# Patient Record
Sex: FEMALE | Race: WHITE | NOT HISPANIC OR LATINO
[De-identification: names, ages, dates, MRNs, and addresses within clinical notes are randomized per-mention and may not be internally consistent; named-entity substitution may affect disease eponyms.]

---

## 2022-01-01 ENCOUNTER — RESULT REVIEW (OUTPATIENT)
Age: 57
End: 2022-01-01

## 2022-01-01 ENCOUNTER — NON-APPOINTMENT (OUTPATIENT)
Age: 57
End: 2022-01-01

## 2022-01-01 ENCOUNTER — APPOINTMENT (OUTPATIENT)
Dept: CT IMAGING | Facility: CLINIC | Age: 57
End: 2022-01-01
Payer: COMMERCIAL

## 2022-01-01 ENCOUNTER — APPOINTMENT (OUTPATIENT)
Dept: HEMATOLOGY ONCOLOGY | Facility: CLINIC | Age: 57
End: 2022-01-01

## 2022-01-01 ENCOUNTER — APPOINTMENT (OUTPATIENT)
Age: 57
End: 2022-01-01

## 2022-01-01 ENCOUNTER — OUTPATIENT (OUTPATIENT)
Dept: OUTPATIENT SERVICES | Facility: HOSPITAL | Age: 57
LOS: 1 days | End: 2022-01-01

## 2022-01-01 ENCOUNTER — OUTPATIENT (OUTPATIENT)
Dept: OUTPATIENT SERVICES | Facility: HOSPITAL | Age: 57
LOS: 1 days | End: 2022-01-01
Payer: COMMERCIAL

## 2022-01-01 ENCOUNTER — EMERGENCY (EMERGENCY)
Facility: HOSPITAL | Age: 57
LOS: 1 days | Discharge: HOME | End: 2022-01-01
Attending: STUDENT IN AN ORGANIZED HEALTH CARE EDUCATION/TRAINING PROGRAM
Payer: COMMERCIAL

## 2022-01-01 ENCOUNTER — OUTPATIENT (OUTPATIENT)
Dept: OUTPATIENT SERVICES | Facility: HOSPITAL | Age: 57
LOS: 1 days | Discharge: HOME | End: 2022-01-01

## 2022-01-01 ENCOUNTER — LABORATORY RESULT (OUTPATIENT)
Age: 57
End: 2022-01-01

## 2022-01-01 ENCOUNTER — TRANSCRIPTION ENCOUNTER (OUTPATIENT)
Age: 57
End: 2022-01-01

## 2022-01-01 ENCOUNTER — APPOINTMENT (OUTPATIENT)
Dept: INTERVENTIONAL RADIOLOGY/VASCULAR | Facility: CLINIC | Age: 57
End: 2022-01-01

## 2022-01-01 ENCOUNTER — APPOINTMENT (OUTPATIENT)
Dept: GASTROENTEROLOGY | Facility: GI CENTER | Age: 57
End: 2022-01-01

## 2022-01-01 ENCOUNTER — APPOINTMENT (OUTPATIENT)
Dept: GASTROENTEROLOGY | Facility: CLINIC | Age: 57
End: 2022-01-01

## 2022-01-01 ENCOUNTER — APPOINTMENT (OUTPATIENT)
Dept: COLORECTAL SURGERY | Facility: CLINIC | Age: 57
End: 2022-01-01

## 2022-01-01 VITALS
SYSTOLIC BLOOD PRESSURE: 128 MMHG | RESPIRATION RATE: 20 BRPM | OXYGEN SATURATION: 96 % | HEART RATE: 77 BPM | TEMPERATURE: 99 F | DIASTOLIC BLOOD PRESSURE: 84 MMHG

## 2022-01-01 VITALS
RESPIRATION RATE: 14 BRPM | OXYGEN SATURATION: 98 % | HEART RATE: 70 BPM | BODY MASS INDEX: 29.44 KG/M2 | HEIGHT: 62 IN | WEIGHT: 160 LBS | SYSTOLIC BLOOD PRESSURE: 110 MMHG | DIASTOLIC BLOOD PRESSURE: 80 MMHG

## 2022-01-01 VITALS
DIASTOLIC BLOOD PRESSURE: 76 MMHG | RESPIRATION RATE: 16 BRPM | TEMPERATURE: 98 F | HEART RATE: 92 BPM | SYSTOLIC BLOOD PRESSURE: 113 MMHG | OXYGEN SATURATION: 97 %

## 2022-01-01 VITALS
HEIGHT: 62 IN | SYSTOLIC BLOOD PRESSURE: 124 MMHG | OXYGEN SATURATION: 92 % | WEIGHT: 157.01 LBS | DIASTOLIC BLOOD PRESSURE: 84 MMHG | HEART RATE: 102 BPM | BODY MASS INDEX: 28.89 KG/M2

## 2022-01-01 VITALS
RESPIRATION RATE: 16 BRPM | SYSTOLIC BLOOD PRESSURE: 132 MMHG | HEIGHT: 62 IN | HEART RATE: 108 BPM | DIASTOLIC BLOOD PRESSURE: 84 MMHG | WEIGHT: 160 LBS | TEMPERATURE: 97.3 F | BODY MASS INDEX: 29.44 KG/M2 | OXYGEN SATURATION: 97 %

## 2022-01-01 VITALS
TEMPERATURE: 97.4 F | HEART RATE: 84 BPM | RESPIRATION RATE: 16 BRPM | DIASTOLIC BLOOD PRESSURE: 80 MMHG | OXYGEN SATURATION: 96 % | SYSTOLIC BLOOD PRESSURE: 111 MMHG

## 2022-01-01 VITALS
HEART RATE: 103 BPM | BODY MASS INDEX: 25.97 KG/M2 | DIASTOLIC BLOOD PRESSURE: 79 MMHG | OXYGEN SATURATION: 98 % | WEIGHT: 142 LBS | SYSTOLIC BLOOD PRESSURE: 114 MMHG

## 2022-01-01 VITALS
RESPIRATION RATE: 18 BRPM | HEART RATE: 90 BPM | DIASTOLIC BLOOD PRESSURE: 87 MMHG | SYSTOLIC BLOOD PRESSURE: 126 MMHG | WEIGHT: 156.09 LBS | OXYGEN SATURATION: 97 % | TEMPERATURE: 98 F | HEIGHT: 64.17 IN

## 2022-01-01 VITALS
HEART RATE: 112 BPM | OXYGEN SATURATION: 97 % | DIASTOLIC BLOOD PRESSURE: 75 MMHG | BODY MASS INDEX: 21.9 KG/M2 | WEIGHT: 119.01 LBS | SYSTOLIC BLOOD PRESSURE: 107 MMHG | HEIGHT: 62 IN

## 2022-01-01 DIAGNOSIS — R18.0 MALIGNANT ASCITES: ICD-10-CM

## 2022-01-01 DIAGNOSIS — C20 MALIGNANT NEOPLASM OF RECTUM: ICD-10-CM

## 2022-01-01 DIAGNOSIS — Z51.89 ENCOUNTER FOR OTHER SPECIFIED AFTERCARE: ICD-10-CM

## 2022-01-01 DIAGNOSIS — C78.6 SECONDARY MALIGNANT NEOPLASM OF RETROPERITONEUM AND PERITONEUM: ICD-10-CM

## 2022-01-01 DIAGNOSIS — R11.2 NAUSEA WITH VOMITING, UNSPECIFIED: ICD-10-CM

## 2022-01-01 DIAGNOSIS — Z12.11 ENCOUNTER FOR SCREENING FOR MALIGNANT NEOPLASM OF COLON: ICD-10-CM

## 2022-01-01 DIAGNOSIS — Z82.49 FAMILY HISTORY OF ISCHEMIC HEART DISEASE AND OTHER DISEASES OF THE CIRCULATORY SYSTEM: ICD-10-CM

## 2022-01-01 DIAGNOSIS — R19.4 CHANGE IN BOWEL HABIT: ICD-10-CM

## 2022-01-01 DIAGNOSIS — Z83.3 FAMILY HISTORY OF DIABETES MELLITUS: ICD-10-CM

## 2022-01-01 DIAGNOSIS — Z51.11 ENCOUNTER FOR ANTINEOPLASTIC CHEMOTHERAPY: ICD-10-CM

## 2022-01-01 DIAGNOSIS — Z87.891 PERSONAL HISTORY OF NICOTINE DEPENDENCE: ICD-10-CM

## 2022-01-01 DIAGNOSIS — C21.8 MALIGNANT NEOPLASM OF OVERLAPPING SITES OF RECTUM, ANUS AND ANAL CANAL: ICD-10-CM

## 2022-01-01 DIAGNOSIS — E86.0 DEHYDRATION: ICD-10-CM

## 2022-01-01 DIAGNOSIS — Z78.9 OTHER SPECIFIED HEALTH STATUS: ICD-10-CM

## 2022-01-01 DIAGNOSIS — Z00.8 ENCOUNTER FOR OTHER GENERAL EXAMINATION: ICD-10-CM

## 2022-01-01 DIAGNOSIS — E87.6 HYPOKALEMIA: ICD-10-CM

## 2022-01-01 DIAGNOSIS — K62.89 OTHER SPECIFIED DISEASES OF ANUS AND RECTUM: ICD-10-CM

## 2022-01-01 LAB
ALBUMIN SERPL ELPH-MCNC: 2.8 G/DL — LOW (ref 3.3–5)
ALBUMIN SERPL ELPH-MCNC: 3.3 G/DL — SIGNIFICANT CHANGE UP (ref 3.3–5)
ALBUMIN SERPL ELPH-MCNC: 3.4 G/DL — SIGNIFICANT CHANGE UP (ref 3.3–5)
ALBUMIN SERPL ELPH-MCNC: 3.9 G/DL — SIGNIFICANT CHANGE UP (ref 3.3–5)
ALBUMIN SERPL ELPH-MCNC: 3.9 G/DL — SIGNIFICANT CHANGE UP (ref 3.3–5)
ALBUMIN SERPL ELPH-MCNC: 4 G/DL — SIGNIFICANT CHANGE UP (ref 3.3–5)
ALBUMIN SERPL ELPH-MCNC: 4 G/DL — SIGNIFICANT CHANGE UP (ref 3.3–5)
ALBUMIN SERPL ELPH-MCNC: 4 G/DL — SIGNIFICANT CHANGE UP (ref 3.3–5.2)
ALBUMIN SERPL ELPH-MCNC: 4.1 G/DL — SIGNIFICANT CHANGE UP (ref 3.3–5)
ALBUMIN SERPL ELPH-MCNC: 4.2 G/DL — SIGNIFICANT CHANGE UP (ref 3.3–5)
ALBUMIN SERPL ELPH-MCNC: 4.3 G/DL
ALP BLD-CCNC: 98 U/L
ALP SERPL-CCNC: 108 U/L — SIGNIFICANT CHANGE UP (ref 40–120)
ALP SERPL-CCNC: 109 U/L — SIGNIFICANT CHANGE UP (ref 40–120)
ALP SERPL-CCNC: 120 U/L — SIGNIFICANT CHANGE UP (ref 40–120)
ALP SERPL-CCNC: 124 U/L — HIGH (ref 40–120)
ALP SERPL-CCNC: 72 U/L — SIGNIFICANT CHANGE UP (ref 40–120)
ALP SERPL-CCNC: 74 U/L — SIGNIFICANT CHANGE UP (ref 40–120)
ALP SERPL-CCNC: 78 U/L — SIGNIFICANT CHANGE UP (ref 40–120)
ALP SERPL-CCNC: 80 U/L — SIGNIFICANT CHANGE UP (ref 40–120)
ALP SERPL-CCNC: 85 U/L — SIGNIFICANT CHANGE UP (ref 40–120)
ALP SERPL-CCNC: 85 U/L — SIGNIFICANT CHANGE UP (ref 40–120)
ALT FLD-CCNC: 11 U/L — SIGNIFICANT CHANGE UP (ref 10–45)
ALT FLD-CCNC: 11 U/L — SIGNIFICANT CHANGE UP (ref 10–45)
ALT FLD-CCNC: 12 U/L — SIGNIFICANT CHANGE UP (ref 10–45)
ALT FLD-CCNC: 13 U/L — SIGNIFICANT CHANGE UP (ref 10–45)
ALT FLD-CCNC: 13 U/L — SIGNIFICANT CHANGE UP (ref 10–45)
ALT FLD-CCNC: 14 U/L — SIGNIFICANT CHANGE UP
ALT FLD-CCNC: 15 U/L — SIGNIFICANT CHANGE UP (ref 10–45)
ALT FLD-CCNC: 21 U/L — SIGNIFICANT CHANGE UP (ref 10–45)
ALT FLD-CCNC: 31 U/L — SIGNIFICANT CHANGE UP (ref 10–45)
ALT FLD-CCNC: 8 U/L — LOW (ref 10–45)
ALT SERPL-CCNC: 14 U/L
ANION GAP SERPL CALC-SCNC: 10 MMOL/L — SIGNIFICANT CHANGE UP (ref 5–17)
ANION GAP SERPL CALC-SCNC: 11 MMOL/L — SIGNIFICANT CHANGE UP (ref 5–17)
ANION GAP SERPL CALC-SCNC: 12 MMOL/L — SIGNIFICANT CHANGE UP (ref 5–17)
ANION GAP SERPL CALC-SCNC: 13 MMOL/L
ANION GAP SERPL CALC-SCNC: 13 MMOL/L — SIGNIFICANT CHANGE UP (ref 5–17)
ANION GAP SERPL CALC-SCNC: 15 MMOL/L — SIGNIFICANT CHANGE UP (ref 5–17)
ANION GAP SERPL CALC-SCNC: 8 MMOL/L — SIGNIFICANT CHANGE UP (ref 5–17)
ANION GAP SERPL CALC-SCNC: 8 MMOL/L — SIGNIFICANT CHANGE UP (ref 5–17)
ANION GAP SERPL CALC-SCNC: 9 MMOL/L — SIGNIFICANT CHANGE UP (ref 5–17)
ANISOCYTOSIS BLD QL: SLIGHT — SIGNIFICANT CHANGE UP
APPEARANCE UR: ABNORMAL
APPEARANCE UR: ABNORMAL
APTT BLD: 29.9 SEC — SIGNIFICANT CHANGE UP (ref 27.5–35.5)
APTT BLD: 32 SEC
AST SERPL-CCNC: 22 U/L — SIGNIFICANT CHANGE UP (ref 10–40)
AST SERPL-CCNC: 28 U/L — SIGNIFICANT CHANGE UP
AST SERPL-CCNC: 28 U/L — SIGNIFICANT CHANGE UP (ref 10–40)
AST SERPL-CCNC: 30 U/L
AST SERPL-CCNC: 33 U/L — SIGNIFICANT CHANGE UP (ref 10–40)
AST SERPL-CCNC: 35 U/L — SIGNIFICANT CHANGE UP (ref 10–40)
AST SERPL-CCNC: 35 U/L — SIGNIFICANT CHANGE UP (ref 10–40)
AST SERPL-CCNC: 37 U/L — SIGNIFICANT CHANGE UP (ref 10–40)
AST SERPL-CCNC: 39 U/L — SIGNIFICANT CHANGE UP (ref 10–40)
AST SERPL-CCNC: 47 U/L — HIGH (ref 10–40)
AST SERPL-CCNC: 52 U/L — HIGH (ref 10–40)
BACTERIA # UR AUTO: NEGATIVE — SIGNIFICANT CHANGE UP
BACTERIA # UR AUTO: NEGATIVE — SIGNIFICANT CHANGE UP
BASOPHILS # BLD AUTO: 0 K/UL — SIGNIFICANT CHANGE UP (ref 0–0.2)
BASOPHILS # BLD AUTO: 0 K/UL — SIGNIFICANT CHANGE UP (ref 0–0.2)
BASOPHILS # BLD AUTO: 0.02 K/UL — SIGNIFICANT CHANGE UP (ref 0–0.2)
BASOPHILS # BLD AUTO: 0.1 K/UL — SIGNIFICANT CHANGE UP (ref 0–0.2)
BASOPHILS NFR BLD AUTO: 0.5 % — SIGNIFICANT CHANGE UP (ref 0–2)
BASOPHILS NFR BLD AUTO: 0.7 % — SIGNIFICANT CHANGE UP (ref 0–2)
BASOPHILS NFR BLD AUTO: 0.9 % — SIGNIFICANT CHANGE UP (ref 0–2)
BASOPHILS NFR BLD AUTO: 1 % — SIGNIFICANT CHANGE UP (ref 0–2)
BASOPHILS NFR BLD AUTO: 1.1 % — SIGNIFICANT CHANGE UP (ref 0–2)
BASOPHILS NFR BLD AUTO: 3 % — HIGH (ref 0–2)
BILIRUB SERPL-MCNC: 0.2 MG/DL — SIGNIFICANT CHANGE UP (ref 0.2–1.2)
BILIRUB SERPL-MCNC: 0.4 MG/DL — SIGNIFICANT CHANGE UP (ref 0.2–1.2)
BILIRUB SERPL-MCNC: 0.4 MG/DL — SIGNIFICANT CHANGE UP (ref 0.2–1.2)
BILIRUB SERPL-MCNC: 0.5 MG/DL — SIGNIFICANT CHANGE UP (ref 0.2–1.2)
BILIRUB SERPL-MCNC: 0.5 MG/DL — SIGNIFICANT CHANGE UP (ref 0.2–1.2)
BILIRUB SERPL-MCNC: 0.6 MG/DL — SIGNIFICANT CHANGE UP (ref 0.2–1.2)
BILIRUB SERPL-MCNC: 0.7 MG/DL
BILIRUB SERPL-MCNC: 0.7 MG/DL — SIGNIFICANT CHANGE UP (ref 0.2–1.2)
BILIRUB SERPL-MCNC: 0.7 MG/DL — SIGNIFICANT CHANGE UP (ref 0.2–1.2)
BILIRUB SERPL-MCNC: 0.9 MG/DL — SIGNIFICANT CHANGE UP (ref 0.2–1.2)
BILIRUB SERPL-MCNC: 0.9 MG/DL — SIGNIFICANT CHANGE UP (ref 0.4–2)
BILIRUB UR-MCNC: NEGATIVE — SIGNIFICANT CHANGE UP
BILIRUB UR-MCNC: NEGATIVE — SIGNIFICANT CHANGE UP
BUN SERPL-MCNC: 10 MG/DL
BUN SERPL-MCNC: 18 MG/DL — SIGNIFICANT CHANGE UP (ref 7–23)
BUN SERPL-MCNC: 6 MG/DL — LOW (ref 7–23)
BUN SERPL-MCNC: 6 MG/DL — LOW (ref 7–23)
BUN SERPL-MCNC: 7 MG/DL — SIGNIFICANT CHANGE UP (ref 7–23)
BUN SERPL-MCNC: 7.1 MG/DL — LOW (ref 8–20)
BUN SERPL-MCNC: 8 MG/DL — SIGNIFICANT CHANGE UP (ref 7–23)
CALCIUM SERPL-MCNC: 7.1 MG/DL — LOW (ref 8.4–10.5)
CALCIUM SERPL-MCNC: 8.4 MG/DL — SIGNIFICANT CHANGE UP (ref 8.4–10.5)
CALCIUM SERPL-MCNC: 8.4 MG/DL — SIGNIFICANT CHANGE UP (ref 8.4–10.5)
CALCIUM SERPL-MCNC: 9.3 MG/DL — SIGNIFICANT CHANGE UP (ref 8.4–10.5)
CALCIUM SERPL-MCNC: 9.4 MG/DL — SIGNIFICANT CHANGE UP (ref 8.4–10.5)
CALCIUM SERPL-MCNC: 9.4 MG/DL — SIGNIFICANT CHANGE UP (ref 8.4–10.5)
CALCIUM SERPL-MCNC: 9.5 MG/DL — SIGNIFICANT CHANGE UP (ref 8.4–10.5)
CALCIUM SERPL-MCNC: 9.6 MG/DL — SIGNIFICANT CHANGE UP (ref 8.4–10.5)
CALCIUM SERPL-MCNC: 9.7 MG/DL
CALCIUM SERPL-MCNC: 9.7 MG/DL — SIGNIFICANT CHANGE UP (ref 8.4–10.5)
CALCIUM SERPL-MCNC: 9.8 MG/DL — SIGNIFICANT CHANGE UP (ref 8.4–10.5)
CEA SERPL-MCNC: 69.1 NG/ML
CHLORIDE SERPL-SCNC: 100 MMOL/L — SIGNIFICANT CHANGE UP (ref 96–108)
CHLORIDE SERPL-SCNC: 101 MMOL/L
CHLORIDE SERPL-SCNC: 101 MMOL/L — SIGNIFICANT CHANGE UP (ref 96–108)
CHLORIDE SERPL-SCNC: 102 MMOL/L — SIGNIFICANT CHANGE UP (ref 96–108)
CHLORIDE SERPL-SCNC: 109 MMOL/L — HIGH (ref 96–108)
CHLORIDE SERPL-SCNC: 90 MMOL/L — LOW (ref 96–108)
CHLORIDE SERPL-SCNC: 95 MMOL/L — LOW (ref 96–108)
CHLORIDE SERPL-SCNC: 95 MMOL/L — LOW (ref 96–108)
CHLORIDE SERPL-SCNC: 95 MMOL/L — LOW (ref 98–107)
CHLORIDE SERPL-SCNC: 96 MMOL/L — SIGNIFICANT CHANGE UP (ref 96–108)
CHLORIDE SERPL-SCNC: 97 MMOL/L — SIGNIFICANT CHANGE UP (ref 96–108)
CO2 SERPL-SCNC: 18 MMOL/L — LOW (ref 22–31)
CO2 SERPL-SCNC: 22 MMOL/L — SIGNIFICANT CHANGE UP (ref 22–31)
CO2 SERPL-SCNC: 24 MMOL/L — SIGNIFICANT CHANGE UP (ref 22–31)
CO2 SERPL-SCNC: 25 MMOL/L
CO2 SERPL-SCNC: 25 MMOL/L — SIGNIFICANT CHANGE UP (ref 22–31)
CO2 SERPL-SCNC: 26 MMOL/L — SIGNIFICANT CHANGE UP (ref 22–29)
CO2 SERPL-SCNC: 26 MMOL/L — SIGNIFICANT CHANGE UP (ref 22–31)
CO2 SERPL-SCNC: 26 MMOL/L — SIGNIFICANT CHANGE UP (ref 22–31)
CO2 SERPL-SCNC: 29 MMOL/L — SIGNIFICANT CHANGE UP (ref 22–31)
CO2 SERPL-SCNC: 29 MMOL/L — SIGNIFICANT CHANGE UP (ref 22–31)
CO2 SERPL-SCNC: 31 MMOL/L — SIGNIFICANT CHANGE UP (ref 22–31)
COD CRY URNS QL: ABNORMAL
COD CRY URNS QL: ABNORMAL
COLOR SPEC: ABNORMAL
COLOR SPEC: YELLOW — SIGNIFICANT CHANGE UP
COMMENT - URINE: SIGNIFICANT CHANGE UP
CREAT SERPL-MCNC: 0.46 MG/DL — LOW (ref 0.5–1.3)
CREAT SERPL-MCNC: 0.52 MG/DL — SIGNIFICANT CHANGE UP (ref 0.5–1.3)
CREAT SERPL-MCNC: 0.53 MG/DL — SIGNIFICANT CHANGE UP (ref 0.5–1.3)
CREAT SERPL-MCNC: 0.55 MG/DL — SIGNIFICANT CHANGE UP (ref 0.5–1.3)
CREAT SERPL-MCNC: 0.56 MG/DL — SIGNIFICANT CHANGE UP (ref 0.5–1.3)
CREAT SERPL-MCNC: 0.6 MG/DL — SIGNIFICANT CHANGE UP (ref 0.5–1.3)
CREAT SERPL-MCNC: 0.6 MG/DL — SIGNIFICANT CHANGE UP (ref 0.5–1.3)
CREAT SERPL-MCNC: 0.63 MG/DL — SIGNIFICANT CHANGE UP (ref 0.5–1.3)
CREAT SERPL-MCNC: 0.65 MG/DL — SIGNIFICANT CHANGE UP (ref 0.5–1.3)
CREAT SERPL-MCNC: 0.76 MG/DL — SIGNIFICANT CHANGE UP (ref 0.5–1.3)
CREAT SERPL-MCNC: 0.81 MG/DL
DACRYOCYTES BLD QL SMEAR: SLIGHT — SIGNIFICANT CHANGE UP
DACRYOCYTES BLD QL SMEAR: SLIGHT — SIGNIFICANT CHANGE UP
DIFF PNL FLD: NEGATIVE — SIGNIFICANT CHANGE UP
DIFF PNL FLD: NEGATIVE — SIGNIFICANT CHANGE UP
DOHLE BOD BLD QL SMEAR: PRESENT — SIGNIFICANT CHANGE UP
DOHLE BOD BLD QL SMEAR: PRESENT — SIGNIFICANT CHANGE UP
EGFR: 102 ML/MIN/1.73M2 — SIGNIFICANT CHANGE UP
EGFR: 103 ML/MIN/1.73M2 — SIGNIFICANT CHANGE UP
EGFR: 104 ML/MIN/1.73M2 — SIGNIFICANT CHANGE UP
EGFR: 104 ML/MIN/1.73M2 — SIGNIFICANT CHANGE UP
EGFR: 106 ML/MIN/1.73M2 — SIGNIFICANT CHANGE UP
EGFR: 106 ML/MIN/1.73M2 — SIGNIFICANT CHANGE UP
EGFR: 107 ML/MIN/1.73M2 — SIGNIFICANT CHANGE UP
EGFR: 108 ML/MIN/1.73M2 — SIGNIFICANT CHANGE UP
EGFR: 111 ML/MIN/1.73M2 — SIGNIFICANT CHANGE UP
EGFR: 84 ML/MIN/1.73M2
EGFR: 91 ML/MIN/1.73M2 — SIGNIFICANT CHANGE UP
EOSINOPHIL # BLD AUTO: 0 K/UL — SIGNIFICANT CHANGE UP (ref 0–0.5)
EOSINOPHIL # BLD AUTO: 0 K/UL — SIGNIFICANT CHANGE UP (ref 0–0.5)
EOSINOPHIL # BLD AUTO: 0.04 K/UL — SIGNIFICANT CHANGE UP (ref 0–0.5)
EOSINOPHIL # BLD AUTO: 0.1 K/UL — SIGNIFICANT CHANGE UP (ref 0–0.5)
EOSINOPHIL NFR BLD AUTO: 0.7 % — SIGNIFICANT CHANGE UP (ref 0–6)
EOSINOPHIL NFR BLD AUTO: 1 % — SIGNIFICANT CHANGE UP (ref 0–6)
EOSINOPHIL NFR BLD AUTO: 1.1 % — SIGNIFICANT CHANGE UP (ref 0–6)
EOSINOPHIL NFR BLD AUTO: 2 % — SIGNIFICANT CHANGE UP (ref 0–6)
EPI CELLS # UR: 2 /HPF — SIGNIFICANT CHANGE UP (ref 0–5)
EPI CELLS # UR: 2 /HPF — SIGNIFICANT CHANGE UP (ref 0–5)
GLUCOSE SERPL-MCNC: 108 MG/DL
GLUCOSE SERPL-MCNC: 120 MG/DL — HIGH (ref 70–99)
GLUCOSE SERPL-MCNC: 66 MG/DL — LOW (ref 70–99)
GLUCOSE SERPL-MCNC: 77 MG/DL — SIGNIFICANT CHANGE UP (ref 70–99)
GLUCOSE SERPL-MCNC: 85 MG/DL — SIGNIFICANT CHANGE UP (ref 70–99)
GLUCOSE SERPL-MCNC: 88 MG/DL — SIGNIFICANT CHANGE UP (ref 70–99)
GLUCOSE SERPL-MCNC: 90 MG/DL — SIGNIFICANT CHANGE UP (ref 70–99)
GLUCOSE SERPL-MCNC: 92 MG/DL — SIGNIFICANT CHANGE UP (ref 70–99)
GLUCOSE SERPL-MCNC: 94 MG/DL — SIGNIFICANT CHANGE UP (ref 70–99)
GLUCOSE SERPL-MCNC: 94 MG/DL — SIGNIFICANT CHANGE UP (ref 70–99)
GLUCOSE SERPL-MCNC: 98 MG/DL — SIGNIFICANT CHANGE UP (ref 70–99)
GLUCOSE UR QL: NEGATIVE — SIGNIFICANT CHANGE UP
GLUCOSE UR QL: SIGNIFICANT CHANGE UP
HAV IGM SER QL: NONREACTIVE
HBV CORE IGM SER QL: NONREACTIVE
HBV SURFACE AG SER QL: NONREACTIVE
HCG SERPL-ACNC: <4 MIU/ML — SIGNIFICANT CHANGE UP
HCT VFR BLD CALC: 35.6 % — SIGNIFICANT CHANGE UP (ref 34.5–45)
HCT VFR BLD CALC: 35.8 % — SIGNIFICANT CHANGE UP (ref 34.5–45)
HCT VFR BLD CALC: 36.2 % — SIGNIFICANT CHANGE UP (ref 34.5–45)
HCT VFR BLD CALC: 36.4 % — SIGNIFICANT CHANGE UP (ref 34.5–45)
HCT VFR BLD CALC: 38.1 % — SIGNIFICANT CHANGE UP (ref 34.5–45)
HCT VFR BLD CALC: 38.4 % — SIGNIFICANT CHANGE UP (ref 34.5–45)
HCT VFR BLD CALC: 38.9 % — SIGNIFICANT CHANGE UP (ref 34.5–45)
HCT VFR BLD CALC: 39 % — SIGNIFICANT CHANGE UP (ref 34.5–45)
HCT VFR BLD CALC: 39 % — SIGNIFICANT CHANGE UP (ref 34.5–45)
HCT VFR BLD CALC: 39.5 % — SIGNIFICANT CHANGE UP (ref 34.5–45)
HCV AB SER QL: NONREACTIVE
HCV S/CO RATIO: 0.07 S/CO
HGB BLD-MCNC: 12.2 G/DL — SIGNIFICANT CHANGE UP (ref 11.5–15.5)
HGB BLD-MCNC: 12.3 G/DL — SIGNIFICANT CHANGE UP (ref 11.5–15.5)
HGB BLD-MCNC: 12.5 G/DL — SIGNIFICANT CHANGE UP (ref 11.5–15.5)
HGB BLD-MCNC: 12.7 G/DL — SIGNIFICANT CHANGE UP (ref 11.5–15.5)
HGB BLD-MCNC: 12.9 G/DL — SIGNIFICANT CHANGE UP (ref 11.5–15.5)
HGB BLD-MCNC: 13 G/DL — SIGNIFICANT CHANGE UP (ref 11.5–15.5)
HGB BLD-MCNC: 13.2 G/DL — SIGNIFICANT CHANGE UP (ref 11.5–15.5)
HGB BLD-MCNC: 13.4 G/DL — SIGNIFICANT CHANGE UP (ref 11.5–15.5)
HYALINE CASTS # UR AUTO: 0 /LPF — SIGNIFICANT CHANGE UP (ref 0–7)
HYALINE CASTS # UR AUTO: 1 /LPF — SIGNIFICANT CHANGE UP (ref 0–7)
IMM GRANULOCYTES NFR BLD AUTO: 0.3 % — SIGNIFICANT CHANGE UP (ref 0–0.9)
INR BLD: 1.14 RATIO — SIGNIFICANT CHANGE UP (ref 0.88–1.16)
INR PPP: 1.11 RATIO
KETONES UR-MCNC: ABNORMAL
KETONES UR-MCNC: NEGATIVE — SIGNIFICANT CHANGE UP
LEUKOCYTE ESTERASE UR-ACNC: NEGATIVE — SIGNIFICANT CHANGE UP
LEUKOCYTE ESTERASE UR-ACNC: NEGATIVE — SIGNIFICANT CHANGE UP
LYMPHOCYTES # BLD AUTO: 1.74 K/UL — SIGNIFICANT CHANGE UP (ref 1–3.3)
LYMPHOCYTES # BLD AUTO: 1.8 K/UL — SIGNIFICANT CHANGE UP (ref 1–3.3)
LYMPHOCYTES # BLD AUTO: 14 % — SIGNIFICANT CHANGE UP (ref 13–44)
LYMPHOCYTES # BLD AUTO: 15 % — SIGNIFICANT CHANGE UP (ref 13–44)
LYMPHOCYTES # BLD AUTO: 2.4 K/UL — SIGNIFICANT CHANGE UP (ref 1–3.3)
LYMPHOCYTES # BLD AUTO: 2.7 K/UL — SIGNIFICANT CHANGE UP (ref 1–3.3)
LYMPHOCYTES # BLD AUTO: 2.8 K/UL — SIGNIFICANT CHANGE UP (ref 1–3.3)
LYMPHOCYTES # BLD AUTO: 2.8 K/UL — SIGNIFICANT CHANGE UP (ref 1–3.3)
LYMPHOCYTES # BLD AUTO: 22 % — SIGNIFICANT CHANGE UP (ref 13–44)
LYMPHOCYTES # BLD AUTO: 3 K/UL — SIGNIFICANT CHANGE UP (ref 1–3.3)
LYMPHOCYTES # BLD AUTO: 3.1 K/UL — SIGNIFICANT CHANGE UP (ref 1–3.3)
LYMPHOCYTES # BLD AUTO: 3.1 K/UL — SIGNIFICANT CHANGE UP (ref 1–3.3)
LYMPHOCYTES # BLD AUTO: 3.5 K/UL — HIGH (ref 1–3.3)
LYMPHOCYTES # BLD AUTO: 33.1 % — SIGNIFICANT CHANGE UP (ref 13–44)
LYMPHOCYTES # BLD AUTO: 36.8 % — SIGNIFICANT CHANGE UP (ref 13–44)
LYMPHOCYTES # BLD AUTO: 37 % — SIGNIFICANT CHANGE UP (ref 13–44)
LYMPHOCYTES # BLD AUTO: 44 % — SIGNIFICANT CHANGE UP (ref 13–44)
LYMPHOCYTES # BLD AUTO: 46.3 % — HIGH (ref 13–44)
LYMPHOCYTES # BLD AUTO: 46.7 % — HIGH (ref 13–44)
LYMPHOCYTES # BLD AUTO: 51 % — HIGH (ref 13–44)
MACROCYTES BLD QL: SLIGHT — SIGNIFICANT CHANGE UP
MAGNESIUM SERPL-MCNC: 2.1 MG/DL
MAGNESIUM SERPL-MCNC: 2.1 MG/DL — SIGNIFICANT CHANGE UP (ref 1.6–2.6)
MAGNESIUM SERPL-MCNC: 2.2 MG/DL — SIGNIFICANT CHANGE UP (ref 1.6–2.6)
MCHC RBC-ENTMCNC: 29.2 PG — SIGNIFICANT CHANGE UP (ref 27–34)
MCHC RBC-ENTMCNC: 29.2 PG — SIGNIFICANT CHANGE UP (ref 27–34)
MCHC RBC-ENTMCNC: 29.7 PG — SIGNIFICANT CHANGE UP (ref 27–34)
MCHC RBC-ENTMCNC: 30.1 PG — SIGNIFICANT CHANGE UP (ref 27–34)
MCHC RBC-ENTMCNC: 30.2 PG — SIGNIFICANT CHANGE UP (ref 27–34)
MCHC RBC-ENTMCNC: 30.2 PG — SIGNIFICANT CHANGE UP (ref 27–34)
MCHC RBC-ENTMCNC: 31.9 PG — SIGNIFICANT CHANGE UP (ref 27–34)
MCHC RBC-ENTMCNC: 32 PG — SIGNIFICANT CHANGE UP (ref 27–34)
MCHC RBC-ENTMCNC: 32.8 G/DL — SIGNIFICANT CHANGE UP (ref 32–36)
MCHC RBC-ENTMCNC: 32.9 PG — SIGNIFICANT CHANGE UP (ref 27–34)
MCHC RBC-ENTMCNC: 33 G/DL — SIGNIFICANT CHANGE UP (ref 32–36)
MCHC RBC-ENTMCNC: 33.2 G/DL — SIGNIFICANT CHANGE UP (ref 32–36)
MCHC RBC-ENTMCNC: 33.4 G/DL — SIGNIFICANT CHANGE UP (ref 32–36)
MCHC RBC-ENTMCNC: 33.4 PG — SIGNIFICANT CHANGE UP (ref 27–34)
MCHC RBC-ENTMCNC: 33.6 G/DL — SIGNIFICANT CHANGE UP (ref 32–36)
MCHC RBC-ENTMCNC: 33.7 G/DL — SIGNIFICANT CHANGE UP (ref 32–36)
MCHC RBC-ENTMCNC: 34 G/DL — SIGNIFICANT CHANGE UP (ref 32–36)
MCHC RBC-ENTMCNC: 34.3 GM/DL — SIGNIFICANT CHANGE UP (ref 32–36)
MCHC RBC-ENTMCNC: 34.6 G/DL — SIGNIFICANT CHANGE UP (ref 32–36)
MCHC RBC-ENTMCNC: 35 G/DL — SIGNIFICANT CHANGE UP (ref 32–36)
MCV RBC AUTO: 85 FL — SIGNIFICANT CHANGE UP (ref 80–100)
MCV RBC AUTO: 88.3 FL — SIGNIFICANT CHANGE UP (ref 80–100)
MCV RBC AUTO: 88.9 FL — SIGNIFICANT CHANGE UP (ref 80–100)
MCV RBC AUTO: 90.4 FL — SIGNIFICANT CHANGE UP (ref 80–100)
MCV RBC AUTO: 90.5 FL — SIGNIFICANT CHANGE UP (ref 80–100)
MCV RBC AUTO: 90.7 FL — SIGNIFICANT CHANGE UP (ref 80–100)
MCV RBC AUTO: 91.5 FL — SIGNIFICANT CHANGE UP (ref 80–100)
MCV RBC AUTO: 94.5 FL — SIGNIFICANT CHANGE UP (ref 80–100)
MCV RBC AUTO: 96.6 FL — SIGNIFICANT CHANGE UP (ref 80–100)
MCV RBC AUTO: 97.9 FL — SIGNIFICANT CHANGE UP (ref 80–100)
MICROCYTES BLD QL: SLIGHT — SIGNIFICANT CHANGE UP
MONOCYTES # BLD AUTO: 0.31 K/UL — SIGNIFICANT CHANGE UP (ref 0–0.9)
MONOCYTES # BLD AUTO: 0.6 K/UL — SIGNIFICANT CHANGE UP (ref 0–0.9)
MONOCYTES # BLD AUTO: 0.7 K/UL — SIGNIFICANT CHANGE UP (ref 0–0.9)
MONOCYTES # BLD AUTO: 0.7 K/UL — SIGNIFICANT CHANGE UP (ref 0–0.9)
MONOCYTES # BLD AUTO: 0.8 K/UL — SIGNIFICANT CHANGE UP (ref 0–0.9)
MONOCYTES # BLD AUTO: 1.1 K/UL — HIGH (ref 0–0.9)
MONOCYTES # BLD AUTO: 1.1 K/UL — HIGH (ref 0–0.9)
MONOCYTES NFR BLD AUTO: 10.5 % — SIGNIFICANT CHANGE UP (ref 2–14)
MONOCYTES NFR BLD AUTO: 12 % — SIGNIFICANT CHANGE UP (ref 2–14)
MONOCYTES NFR BLD AUTO: 12 % — SIGNIFICANT CHANGE UP (ref 2–14)
MONOCYTES NFR BLD AUTO: 14 % — SIGNIFICANT CHANGE UP (ref 2–14)
MONOCYTES NFR BLD AUTO: 4 % — SIGNIFICANT CHANGE UP (ref 2–14)
MONOCYTES NFR BLD AUTO: 5 % — SIGNIFICANT CHANGE UP (ref 2–14)
MONOCYTES NFR BLD AUTO: 7.4 % — SIGNIFICANT CHANGE UP (ref 2–14)
MONOCYTES NFR BLD AUTO: 8 % — SIGNIFICANT CHANGE UP (ref 2–14)
MONOCYTES NFR BLD AUTO: 8.2 % — SIGNIFICANT CHANGE UP (ref 2–14)
MONOCYTES NFR BLD AUTO: 8.9 % — SIGNIFICANT CHANGE UP (ref 2–14)
MYELOCYTES NFR BLD: 1 % — HIGH (ref 0–0)
MYELOCYTES NFR BLD: 2 % — HIGH (ref 0–0)
MYELOCYTES NFR BLD: 2 % — HIGH (ref 0–0)
NEUTROPHILS # BLD AUTO: 1.2 K/UL — LOW (ref 1.8–7.4)
NEUTROPHILS # BLD AUTO: 1.64 K/UL — LOW (ref 1.8–7.4)
NEUTROPHILS # BLD AUTO: 12 K/UL — HIGH (ref 1.8–7.4)
NEUTROPHILS # BLD AUTO: 12.3 K/UL — HIGH (ref 1.8–7.4)
NEUTROPHILS # BLD AUTO: 2 K/UL — SIGNIFICANT CHANGE UP (ref 1.8–7.4)
NEUTROPHILS # BLD AUTO: 2.6 K/UL — SIGNIFICANT CHANGE UP (ref 1.8–7.4)
NEUTROPHILS # BLD AUTO: 3.2 K/UL — SIGNIFICANT CHANGE UP (ref 1.8–7.4)
NEUTROPHILS # BLD AUTO: 4.1 K/UL — SIGNIFICANT CHANGE UP (ref 1.8–7.4)
NEUTROPHILS # BLD AUTO: 4.5 K/UL — SIGNIFICANT CHANGE UP (ref 1.8–7.4)
NEUTROPHILS # BLD AUTO: 6.1 K/UL — SIGNIFICANT CHANGE UP (ref 1.8–7.4)
NEUTROPHILS NFR BLD AUTO: 30 % — LOW (ref 43–77)
NEUTROPHILS NFR BLD AUTO: 39 % — LOW (ref 43–77)
NEUTROPHILS NFR BLD AUTO: 40.9 % — LOW (ref 43–77)
NEUTROPHILS NFR BLD AUTO: 43 % — SIGNIFICANT CHANGE UP (ref 43–77)
NEUTROPHILS NFR BLD AUTO: 43.6 % — SIGNIFICANT CHANGE UP (ref 43–77)
NEUTROPHILS NFR BLD AUTO: 54.2 % — SIGNIFICANT CHANGE UP (ref 43–77)
NEUTROPHILS NFR BLD AUTO: 56 % — SIGNIFICANT CHANGE UP (ref 43–77)
NEUTROPHILS NFR BLD AUTO: 56.2 % — SIGNIFICANT CHANGE UP (ref 43–77)
NEUTROPHILS NFR BLD AUTO: 70 % — SIGNIFICANT CHANGE UP (ref 43–77)
NEUTROPHILS NFR BLD AUTO: 71 % — SIGNIFICANT CHANGE UP (ref 43–77)
NEUTS BAND # BLD: 1 % — SIGNIFICANT CHANGE UP (ref 0–8)
NEUTS BAND # BLD: 1 % — SIGNIFICANT CHANGE UP (ref 0–8)
NEUTS BAND # BLD: 2 % — SIGNIFICANT CHANGE UP (ref 0–8)
NITRITE UR-MCNC: NEGATIVE — SIGNIFICANT CHANGE UP
NITRITE UR-MCNC: NEGATIVE — SIGNIFICANT CHANGE UP
NON-GYNECOLOGICAL CYTOLOGY STUDY: SIGNIFICANT CHANGE UP
NRBC # BLD: 1 /100 — HIGH (ref 0–0)
OVALOCYTES BLD QL SMEAR: SLIGHT — SIGNIFICANT CHANGE UP
PH UR: 6 — SIGNIFICANT CHANGE UP (ref 5–8)
PH UR: 7 — SIGNIFICANT CHANGE UP (ref 5–8)
PLAT MORPH BLD: NORMAL — SIGNIFICANT CHANGE UP
PLATELET # BLD AUTO: 111 K/UL — LOW (ref 150–400)
PLATELET # BLD AUTO: 117 K/UL — LOW (ref 150–400)
PLATELET # BLD AUTO: 136 K/UL — LOW (ref 150–400)
PLATELET # BLD AUTO: 136 K/UL — LOW (ref 150–400)
PLATELET # BLD AUTO: 145 K/UL — LOW (ref 150–400)
PLATELET # BLD AUTO: 66 K/UL — LOW (ref 150–400)
PLATELET # BLD AUTO: 74 K/UL — LOW (ref 150–400)
PLATELET # BLD AUTO: 82 K/UL — LOW (ref 150–400)
PLATELET # BLD AUTO: 88 K/UL — LOW (ref 150–400)
PLATELET # BLD AUTO: 97 K/UL — LOW (ref 150–400)
POIKILOCYTOSIS BLD QL AUTO: SIGNIFICANT CHANGE UP
POIKILOCYTOSIS BLD QL AUTO: SLIGHT — SIGNIFICANT CHANGE UP
POLYCHROMASIA BLD QL SMEAR: SLIGHT — SIGNIFICANT CHANGE UP
POTASSIUM SERPL-MCNC: 2.6 MMOL/L — CRITICAL LOW (ref 3.5–5.3)
POTASSIUM SERPL-MCNC: 2.8 MMOL/L — CRITICAL LOW (ref 3.5–5.3)
POTASSIUM SERPL-MCNC: 2.9 MMOL/L — CRITICAL LOW (ref 3.5–5.3)
POTASSIUM SERPL-MCNC: 3.3 MMOL/L — LOW (ref 3.5–5.3)
POTASSIUM SERPL-MCNC: 3.4 MMOL/L — LOW (ref 3.5–5.3)
POTASSIUM SERPL-MCNC: 3.5 MMOL/L — SIGNIFICANT CHANGE UP (ref 3.5–5.3)
POTASSIUM SERPL-MCNC: 3.7 MMOL/L — SIGNIFICANT CHANGE UP (ref 3.5–5.3)
POTASSIUM SERPL-MCNC: 4 MMOL/L — SIGNIFICANT CHANGE UP (ref 3.5–5.3)
POTASSIUM SERPL-MCNC: 4.5 MMOL/L — SIGNIFICANT CHANGE UP (ref 3.5–5.3)
POTASSIUM SERPL-MCNC: 4.7 MMOL/L — SIGNIFICANT CHANGE UP (ref 3.5–5.3)
POTASSIUM SERPL-SCNC: 2.6 MMOL/L — CRITICAL LOW (ref 3.5–5.3)
POTASSIUM SERPL-SCNC: 2.8 MMOL/L — CRITICAL LOW (ref 3.5–5.3)
POTASSIUM SERPL-SCNC: 2.9 MMOL/L — CRITICAL LOW (ref 3.5–5.3)
POTASSIUM SERPL-SCNC: 3.3 MMOL/L — LOW (ref 3.5–5.3)
POTASSIUM SERPL-SCNC: 3.4 MMOL/L — LOW (ref 3.5–5.3)
POTASSIUM SERPL-SCNC: 3.5 MMOL/L — SIGNIFICANT CHANGE UP (ref 3.5–5.3)
POTASSIUM SERPL-SCNC: 3.7 MMOL/L — SIGNIFICANT CHANGE UP (ref 3.5–5.3)
POTASSIUM SERPL-SCNC: 4 MMOL/L — SIGNIFICANT CHANGE UP (ref 3.5–5.3)
POTASSIUM SERPL-SCNC: 4.3 MMOL/L
POTASSIUM SERPL-SCNC: 4.5 MMOL/L — SIGNIFICANT CHANGE UP (ref 3.5–5.3)
POTASSIUM SERPL-SCNC: 4.7 MMOL/L — SIGNIFICANT CHANGE UP (ref 3.5–5.3)
PROT SERPL-MCNC: 5 G/DL — LOW (ref 6–8.3)
PROT SERPL-MCNC: 5.7 G/DL — LOW (ref 6–8.3)
PROT SERPL-MCNC: 5.7 G/DL — LOW (ref 6–8.3)
PROT SERPL-MCNC: 6.3 G/DL — SIGNIFICANT CHANGE UP (ref 6–8.3)
PROT SERPL-MCNC: 6.3 G/DL — SIGNIFICANT CHANGE UP (ref 6–8.3)
PROT SERPL-MCNC: 6.4 G/DL — SIGNIFICANT CHANGE UP (ref 6–8.3)
PROT SERPL-MCNC: 6.5 G/DL — SIGNIFICANT CHANGE UP (ref 6–8.3)
PROT SERPL-MCNC: 6.8 G/DL — SIGNIFICANT CHANGE UP (ref 6.6–8.7)
PROT SERPL-MCNC: 6.9 G/DL
PROT SERPL-MCNC: 6.9 G/DL — SIGNIFICANT CHANGE UP (ref 6–8.3)
PROT SERPL-MCNC: 7.1 G/DL — SIGNIFICANT CHANGE UP (ref 6–8.3)
PROT UR-MCNC: ABNORMAL
PROT UR-MCNC: ABNORMAL
PROTHROM AB SERPL-ACNC: 13.3 SEC — SIGNIFICANT CHANGE UP (ref 10.5–13.4)
PT BLD: 13.1 SEC
RBC # BLD: 3.68 M/UL — LOW (ref 3.8–5.2)
RBC # BLD: 3.7 M/UL — LOW (ref 3.8–5.2)
RBC # BLD: 3.91 M/UL — SIGNIFICANT CHANGE UP (ref 3.8–5.2)
RBC # BLD: 4.13 M/UL — SIGNIFICANT CHANGE UP (ref 3.8–5.2)
RBC # BLD: 4.21 M/UL — SIGNIFICANT CHANGE UP (ref 3.8–5.2)
RBC # BLD: 4.28 M/UL — SIGNIFICANT CHANGE UP (ref 3.8–5.2)
RBC # BLD: 4.29 M/UL — SIGNIFICANT CHANGE UP (ref 3.8–5.2)
RBC # BLD: 4.31 M/UL — SIGNIFICANT CHANGE UP (ref 3.8–5.2)
RBC # BLD: 4.35 M/UL — SIGNIFICANT CHANGE UP (ref 3.8–5.2)
RBC # BLD: 4.44 M/UL — SIGNIFICANT CHANGE UP (ref 3.8–5.2)
RBC # FLD: 12.4 % — SIGNIFICANT CHANGE UP (ref 10.3–14.5)
RBC # FLD: 12.6 % — SIGNIFICANT CHANGE UP (ref 10.3–14.5)
RBC # FLD: 13.2 % — SIGNIFICANT CHANGE UP (ref 10.3–14.5)
RBC # FLD: 14.2 % — SIGNIFICANT CHANGE UP (ref 10.3–14.5)
RBC # FLD: 15 % — HIGH (ref 10.3–14.5)
RBC # FLD: 15.1 % — HIGH (ref 10.3–14.5)
RBC # FLD: 15.2 % — HIGH (ref 10.3–14.5)
RBC # FLD: 15.6 % — HIGH (ref 10.3–14.5)
RBC # FLD: 16.3 % — HIGH (ref 10.3–14.5)
RBC # FLD: 16.4 % — HIGH (ref 10.3–14.5)
RBC BLD AUTO: SIGNIFICANT CHANGE UP
RBC CASTS # UR COMP ASSIST: 2 /HPF — SIGNIFICANT CHANGE UP (ref 0–4)
RBC CASTS # UR COMP ASSIST: 4 /HPF — SIGNIFICANT CHANGE UP (ref 0–4)
SARS-COV-2 N GENE NPH QL NAA+PROBE: NOT DETECTED
SODIUM SERPL-SCNC: 132 MMOL/L — LOW (ref 135–145)
SODIUM SERPL-SCNC: 133 MMOL/L — LOW (ref 135–145)
SODIUM SERPL-SCNC: 133 MMOL/L — LOW (ref 135–145)
SODIUM SERPL-SCNC: 134 MMOL/L — LOW (ref 135–145)
SODIUM SERPL-SCNC: 135 MMOL/L — SIGNIFICANT CHANGE UP (ref 135–145)
SODIUM SERPL-SCNC: 136 MMOL/L — SIGNIFICANT CHANGE UP (ref 135–145)
SODIUM SERPL-SCNC: 136 MMOL/L — SIGNIFICANT CHANGE UP (ref 135–145)
SODIUM SERPL-SCNC: 137 MMOL/L — SIGNIFICANT CHANGE UP (ref 135–145)
SODIUM SERPL-SCNC: 138 MMOL/L
SP GR SPEC: 1.02 — SIGNIFICANT CHANGE UP (ref 1.01–1.02)
SP GR SPEC: 1.02 — SIGNIFICANT CHANGE UP (ref 1.01–1.02)
STOMATOCYTES BLD QL SMEAR: PRESENT — SIGNIFICANT CHANGE UP
STOMATOCYTES BLD QL SMEAR: PRESENT — SIGNIFICANT CHANGE UP
SURGICAL PATHOLOGY STUDY: SIGNIFICANT CHANGE UP
TOXIC GRANULES BLD QL SMEAR: PRESENT — SIGNIFICANT CHANGE UP
UROBILINOGEN FLD QL: SIGNIFICANT CHANGE UP
UROBILINOGEN FLD QL: SIGNIFICANT CHANGE UP
VARIANT LYMPHS # BLD: 4 % — SIGNIFICANT CHANGE UP (ref 0–6)
VARIANT LYMPHS # BLD: 4 % — SIGNIFICANT CHANGE UP (ref 0–6)
VARIANT LYMPHS # BLD: 5 % — SIGNIFICANT CHANGE UP (ref 0–6)
VARIANT LYMPHS # BLD: 5 % — SIGNIFICANT CHANGE UP (ref 0–6)
VARIANT LYMPHS # BLD: 7 % — HIGH (ref 0–6)
VARIANT LYMPHS # BLD: 7 % — HIGH (ref 0–6)
WBC # BLD: 16.1 K/UL — HIGH (ref 3.8–10.5)
WBC # BLD: 17 K/UL — HIGH (ref 3.8–10.5)
WBC # BLD: 3.76 K/UL — LOW (ref 3.8–10.5)
WBC # BLD: 3.8 K/UL — SIGNIFICANT CHANGE UP (ref 3.8–10.5)
WBC # BLD: 5.1 K/UL — SIGNIFICANT CHANGE UP (ref 3.8–10.5)
WBC # BLD: 6.3 K/UL — SIGNIFICANT CHANGE UP (ref 3.8–10.5)
WBC # BLD: 6.9 K/UL — SIGNIFICANT CHANGE UP (ref 3.8–10.5)
WBC # BLD: 7.5 K/UL — SIGNIFICANT CHANGE UP (ref 3.8–10.5)
WBC # BLD: 8.1 K/UL — SIGNIFICANT CHANGE UP (ref 3.8–10.5)
WBC # BLD: 9.4 K/UL — SIGNIFICANT CHANGE UP (ref 3.8–10.5)
WBC # FLD AUTO: 16.1 K/UL — HIGH (ref 3.8–10.5)
WBC # FLD AUTO: 17 K/UL — HIGH (ref 3.8–10.5)
WBC # FLD AUTO: 3.76 K/UL — LOW (ref 3.8–10.5)
WBC # FLD AUTO: 3.8 K/UL — SIGNIFICANT CHANGE UP (ref 3.8–10.5)
WBC # FLD AUTO: 5.1 K/UL — SIGNIFICANT CHANGE UP (ref 3.8–10.5)
WBC # FLD AUTO: 6.3 K/UL — SIGNIFICANT CHANGE UP (ref 3.8–10.5)
WBC # FLD AUTO: 6.9 K/UL — SIGNIFICANT CHANGE UP (ref 3.8–10.5)
WBC # FLD AUTO: 7.5 K/UL — SIGNIFICANT CHANGE UP (ref 3.8–10.5)
WBC # FLD AUTO: 8.1 K/UL — SIGNIFICANT CHANGE UP (ref 3.8–10.5)
WBC # FLD AUTO: 9.4 K/UL — SIGNIFICANT CHANGE UP (ref 3.8–10.5)
WBC UR QL: 2 /HPF — SIGNIFICANT CHANGE UP (ref 0–5)
WBC UR QL: 4 /HPF — SIGNIFICANT CHANGE UP (ref 0–5)

## 2022-01-01 PROCEDURE — 88341 IMHCHEM/IMCYTCHM EA ADD ANTB: CPT | Mod: 26

## 2022-01-01 PROCEDURE — 88305 TISSUE EXAM BY PATHOLOGIST: CPT | Mod: 26

## 2022-01-01 PROCEDURE — 77001 FLUOROGUIDE FOR VEIN DEVICE: CPT | Mod: 26

## 2022-01-01 PROCEDURE — 99285 EMERGENCY DEPT VISIT HI MDM: CPT

## 2022-01-01 PROCEDURE — 96374 THER/PROPH/DIAG INJ IV PUSH: CPT

## 2022-01-01 PROCEDURE — 88305 TISSUE EXAM BY PATHOLOGIST: CPT

## 2022-01-01 PROCEDURE — 45380 COLONOSCOPY AND BIOPSY: CPT

## 2022-01-01 PROCEDURE — 45380 COLONOSCOPY AND BIOPSY: CPT | Mod: 33

## 2022-01-01 PROCEDURE — 80053 COMPREHEN METABOLIC PANEL: CPT

## 2022-01-01 PROCEDURE — 71260 CT THORAX DX C+: CPT | Mod: 26

## 2022-01-01 PROCEDURE — 74176 CT ABD & PELVIS W/O CONTRAST: CPT | Mod: MA

## 2022-01-01 PROCEDURE — 36415 COLL VENOUS BLD VENIPUNCTURE: CPT

## 2022-01-01 PROCEDURE — 99284 EMERGENCY DEPT VISIT MOD MDM: CPT | Mod: 25

## 2022-01-01 PROCEDURE — 99214 OFFICE O/P EST MOD 30 MIN: CPT

## 2022-01-01 PROCEDURE — 84702 CHORIONIC GONADOTROPIN TEST: CPT

## 2022-01-01 PROCEDURE — 88342 IMHCHEM/IMCYTCHM 1ST ANTB: CPT | Mod: 26

## 2022-01-01 PROCEDURE — 88112 CYTOPATH CELL ENHANCE TECH: CPT | Mod: 26

## 2022-01-01 PROCEDURE — 49083 ABD PARACENTESIS W/IMAGING: CPT

## 2022-01-01 PROCEDURE — 85610 PROTHROMBIN TIME: CPT

## 2022-01-01 PROCEDURE — 85730 THROMBOPLASTIN TIME PARTIAL: CPT

## 2022-01-01 PROCEDURE — 71260 CT THORAX DX C+: CPT

## 2022-01-01 PROCEDURE — 99205 OFFICE O/P NEW HI 60 MIN: CPT

## 2022-01-01 PROCEDURE — 76937 US GUIDE VASCULAR ACCESS: CPT | Mod: 26

## 2022-01-01 PROCEDURE — 85025 COMPLETE CBC W/AUTO DIFF WBC: CPT

## 2022-01-01 PROCEDURE — 74177 CT ABD & PELVIS W/CONTRAST: CPT

## 2022-01-01 PROCEDURE — 99204 OFFICE O/P NEW MOD 45 MIN: CPT

## 2022-01-01 PROCEDURE — 74176 CT ABD & PELVIS W/O CONTRAST: CPT | Mod: 26,MA

## 2022-01-01 PROCEDURE — 88342 IMHCHEM/IMCYTCHM 1ST ANTB: CPT

## 2022-01-01 PROCEDURE — 74177 CT ABD & PELVIS W/CONTRAST: CPT | Mod: 26

## 2022-01-01 PROCEDURE — 36561 INSERT TUNNELED CV CATH: CPT

## 2022-01-01 PROCEDURE — 88341 IMHCHEM/IMCYTCHM EA ADD ANTB: CPT

## 2022-01-01 RX ORDER — ONDANSETRON 8 MG/1
8 TABLET, ORALLY DISINTEGRATING ORAL EVERY 8 HOURS
Qty: 90 | Refills: 3 | Status: ACTIVE | COMMUNITY
Start: 2022-01-01 | End: 1900-01-01

## 2022-01-01 RX ORDER — ALPRAZOLAM 0.5 MG/1
0.5 TABLET ORAL
Qty: 90 | Refills: 0 | Status: DISCONTINUED | COMMUNITY
Start: 2022-01-01 | End: 2022-01-01

## 2022-01-01 RX ORDER — POLYETHYLENE GLYCOL-3350 AND ELECTROLYTES 236; 6.74; 5.86; 2.97; 22.74 G/274.31G; G/274.31G; G/274.31G; G/274.31G; G/274.31G
236 POWDER, FOR SOLUTION ORAL
Qty: 1 | Refills: 0 | Status: ACTIVE | COMMUNITY
Start: 2022-01-01 | End: 1900-01-01

## 2022-01-01 RX ORDER — DIPHENOXYLATE HYDROCHLORIDE AND ATROPINE SULFATE 2.5; .025 MG/1; MG/1
2.5-0.025 TABLET ORAL EVERY 8 HOURS
Qty: 90 | Refills: 0 | Status: ACTIVE | COMMUNITY
Start: 2022-01-01 | End: 1900-01-01

## 2022-01-01 RX ORDER — PROCHLORPERAZINE MALEATE 10 MG/1
10 TABLET ORAL EVERY 6 HOURS
Qty: 120 | Refills: 5 | Status: ACTIVE | COMMUNITY
Start: 2022-01-01 | End: 1900-01-01

## 2022-01-01 RX ORDER — MULTIVITAMIN
CAPSULE ORAL
Refills: 0 | Status: ACTIVE | COMMUNITY

## 2022-01-01 RX ORDER — MORPHINE SULFATE 50 MG/1
4 CAPSULE, EXTENDED RELEASE ORAL ONCE
Refills: 0 | Status: DISCONTINUED | OUTPATIENT
Start: 2022-01-01 | End: 2022-01-01

## 2022-01-01 RX ORDER — LIDOCAINE AND PRILOCAINE 25; 25 MG/G; MG/G
2.5-2.5 CREAM TOPICAL
Qty: 30 | Refills: 1 | Status: ACTIVE | COMMUNITY
Start: 2022-01-01 | End: 1900-01-01

## 2022-01-01 RX ORDER — VENLAFAXINE HYDROCHLORIDE 37.5 MG/1
37.5 CAPSULE, EXTENDED RELEASE ORAL
Qty: 90 | Refills: 0 | Status: DISCONTINUED | COMMUNITY
Start: 2022-01-01 | End: 2022-01-01

## 2022-01-01 RX ADMIN — MORPHINE SULFATE 4 MILLIGRAM(S): 50 CAPSULE, EXTENDED RELEASE ORAL at 15:45

## 2022-07-05 PROBLEM — Z00.00 ENCOUNTER FOR PREVENTIVE HEALTH EXAMINATION: Status: ACTIVE | Noted: 2022-01-01

## 2022-07-11 PROBLEM — Z78.9 NON-SMOKER: Status: ACTIVE | Noted: 2022-01-01

## 2022-07-11 PROBLEM — R19.4 CHANGE IN BOWEL HABITS: Status: ACTIVE | Noted: 2022-01-01

## 2022-07-11 PROBLEM — Z78.9 NO PERTINENT PAST MEDICAL HISTORY: Status: RESOLVED | Noted: 2022-01-01 | Resolved: 2022-01-01

## 2022-07-11 NOTE — HISTORY OF PRESENT ILLNESS
[de-identified] : Patient is a 56 year female, with PMH of anxiety, who presents for colon cancer screening. \par \par Patient has not had a colonoscopy in the past. Patient denies a family h/o colon polyps or colon cancer. \par \par Patient states that over the last 2 months, she has noticed a change in her BMs. Patient is softer, more broken up stools with some mucous per rectum. She has excessive belching and bloating and excessive flatulence. Feels urgency to have a BM but only able to pass gas at that time. She rarely sees a "tiny tint" of blood on toilet paper. She has not had a solid/formed stool in about 2 months. \par \par Patient denies pyrosis, dysphagia, nausea, vomiting, or unexplained weight loss. \par \par Patient denies any significant cardiac or pulmonary conditions.\par \par No recent labs with PCP. \par \par

## 2022-07-11 NOTE — ASSESSMENT
[FreeTextEntry1] : Patient is a 56 year female, with PMH of anxiety, who presents for colon cancer screening. Patient has not had a colonoscopy in the past. Patient denies a family h/o colon polyps or colon cancer. \par \par Irregular/Change in BMs over the last 2 months including less formed stool, more broken up, rarely slight red tint to toilet paper, consistent with blood. \par \par Colonoscopy with biopsy to r/o microscopic colitis to be scheduled. \par \par I have discussed the indications, risks and benefits of procedure with patient. Risks include, but not limited to, bleeding, perforation, infection, and reaction to anesthesia. Alternatives to colonoscopy discussed with patient. Patient was given the opportunity to ask questions, all questions were answered. The patient agrees to proceed with colonoscopy. Patient is medically optimized for colonoscopy. \par \par Gavilyte prep to be used. Bowel prep instructions discussed at length. \par \par CBC/CMP, PT/INR ordered prior to procedure\par

## 2022-08-16 PROBLEM — K62.89 RECTAL MASS: Status: ACTIVE | Noted: 2022-01-01

## 2022-08-16 PROBLEM — Z12.11 COLON CANCER SCREENING: Status: ACTIVE | Noted: 2022-01-01

## 2022-08-16 NOTE — PHYSICAL EXAM
[General Appearance - Alert] : alert [General Appearance - In No Acute Distress] : in no acute distress [General Appearance - Well Nourished] : well nourished [General Appearance - Well Developed] : well developed [General Appearance - Well-Appearing] : healthy appearing [Sclera] : the sclera and conjunctiva were normal [PERRL With Normal Accommodation] : pupils were equal in size, round, and reactive to light [Extraocular Movements] : extraocular movements were intact [Outer Ear] : the ears and nose were normal in appearance [Hearing Threshold Finger Rub Not [place]] : hearing was normal [Examination Of The Oral Cavity] : the lips and gums were normal [Neck Appearance] : the appearance of the neck was normal [Auscultation Breath Sounds / Voice Sounds] : lungs were clear to auscultation bilaterally [Heart Rate And Rhythm] : heart rate was normal and rhythm regular [Heart Sounds] : normal S1 and S2 [Heart Sounds Gallop] : no gallops [Murmurs] : no murmurs [Heart Sounds Pericardial Friction Rub] : no pericardial rub [Bowel Sounds] : normal bowel sounds [Abdomen Soft] : soft [Abdomen Tenderness] : non-tender [Abdomen Mass (___ Cm)] : no abdominal mass palpated [Abnormal Walk] : normal gait [Nail Clubbing] : no clubbing  or cyanosis of the fingernails [Musculoskeletal - Swelling] : no joint swelling seen [Motor Tone] : muscle strength and tone were normal [Skin Color & Pigmentation] : normal skin color and pigmentation [Skin Turgor] : normal skin turgor [] : no rash [Motor Exam] : the motor exam was normal [No Focal Deficits] : no focal deficits [Oriented To Time, Place, And Person] : oriented to person, place, and time [Impaired Insight] : insight and judgment were intact [Affect] : the affect was normal

## 2022-08-24 PROBLEM — Z83.3 FAMILY HISTORY OF DIABETES MELLITUS: Status: ACTIVE | Noted: 2022-01-01

## 2022-08-24 PROBLEM — Z82.49 FAMILY HISTORY OF HYPERTENSION: Status: ACTIVE | Noted: 2022-01-01

## 2022-08-24 PROBLEM — Z82.49 FAMILY HISTORY OF ATRIAL FIBRILLATION: Status: ACTIVE | Noted: 2022-01-01

## 2022-08-24 PROBLEM — Z87.891 FORMER SMOKER: Status: ACTIVE | Noted: 2022-01-01

## 2022-08-24 NOTE — ASSESSMENT
[FreeTextEntry1] : Ms. Cruz presents to the office for consultation for newly diagnosed rectal cancer, presumed to be stage 4 by imaging. LADAN confirmed presence of firm mass ~ 6 cm above AV. Pt and  advised of above, and recommended to complete staging with CT chest. No current role for surgical intervention as needs to initate systemic chemo to control disease. Counselled on potential need for diverting colostomy if develops obstructive symptoms. Will facilitate consultation with med onc. All questions answered to patient and  satisfaction.

## 2022-08-24 NOTE — CONSULT LETTER
[Dear  ___] : Dear  [unfilled], [Consult Letter:] : I had the pleasure of evaluating your patient, [unfilled]. [Please see my note below.] : Please see my note below. [Consult Closing:] : Thank you very much for allowing me to participate in the care of this patient.  If you have any questions, please do not hesitate to contact me. [Sincerely,] : Sincerely, [FreeTextEntry3] : Sara López MD\par

## 2022-08-24 NOTE — HISTORY OF PRESENT ILLNESS
[FreeTextEntry1] : Ms. Cruz presents to the office for consultation.  She underwent a screening colonoscopy on 8/16/22 for change in bowel habits with findings of a near obstructing rectal mass from 6 cm above the anal verge to approximately 12 cm.  Biopsies of the mass consistent with poorly differentiated adenocarcinoma with signet ring cells and focal mucin.  She subsequently underwent a staging CT A/P which demonstrated extensive ~ 6cm  rectal mass with infiltration of the pelvic sidewalls.  There was also findings of large amount of ascitic fluid consistent with malignant ascites as well as omental caking and peritoneal carcinomatosis.  CEA 65.  No CT chest available.  She reports being able to pass bowel movements, but with difficulty and not consistently.  She continues to pass flatus without difficulties.  No family history of colon or rectal cancer.

## 2022-08-24 NOTE — PHYSICAL EXAM
[Gross Blood] : no gross blood [de-identified] : Mass~ 6cm above AV, firm  [de-identified] : No apparent distress [de-identified] : Normocephalic atraumatic

## 2022-08-26 NOTE — HISTORY OF PRESENT ILLNESS
[de-identified] : 56 yo F with no significant PMH who was diagnosed with rectal cancer in August 2022.\par \par She had a screening colonoscopy on 8/16/2 due to a change in bowel habits which showed a near obstructing rectal mass from 6 cm above the anal verge to approximately 12 cm. Biopsies of the mass consistent with poorly differentiated adenocarcinoma with signet ring cells and focal mucin. \par \par Staging CT A/P demonstrated extensive ~ 6cm rectal mass with infiltration of the pelvic sidewalls. There was also findings of large amount of ascitic fluid consistent with malignant ascites as well as omental caking and peritoneal carcinomatosis. Cirrhotic liver. No focal masses. CEA 65. \par \par She saw colorectal surgery, Dr. Sara López, and systemic treatment was recommended. She reports being able to pass bowel movements, but with difficulty and not consistently. Complains of abd distention, worsened compared to 2 weeks ago. No family history of colon or rectal cancer. 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 She normaly stays active by [email or web address] and goes to the gym. \par \par \par former smoker\par \par social drinking\par \par

## 2022-08-26 NOTE — ASSESSMENT
[FreeTextEntry1] : 56 yo F with no significant PMH who was diagnosed with rectal cancer in August 2022.\par \par Colonoscopy on 8/16/2 due to a change in bowel habits which showed a near obstructing rectal mass from 6 cm above the anal verge to approximately 12 cm. Biopsies of the mass consistent with poorly differentiated adenocarcinoma with signet ring cells and focal mucin. \par \par Staging CT A/P demonstrated extensive ~ 6cm rectal mass with infiltration of the pelvic sidewalls. There was also findings of large amount of ascitic fluid consistent with malignant ascites as well as omental caking and peritoneal carcinomatosis. Cirrhotic liver. No focal masses. CEA 65. \par \par \par # colon cancer with carcinomatosis\par -Tumor is RAFFI\par -will send for NGS\par -discuss and pt and  the natural history of stage IV colon cancer. Curative treatment is no longer an option. We will treat with chemotherapy. Discussed the option of FOLFOX vs FOLFIRI as 1st line treatment options.\par -will proceed with FOLFOX. Risks and benefits discussed. Questions and concerns were addressed to their satisfaction. Consent obtained\par -will plan to ass cetuximab (if NRAS wild type) or Jami pending NGS result\par -discussed GIANT trial a phase III randomized trial with high dose VIT D vs  standard dose Vit D in combination with chemo. Pt will think about it\par -will arrange for port placement\par -obtain CT chest record\par -tentatively to start treatment 9/5/22\par \par \par # supportive\par -Ascites - if distention worsens, will arrange for paracentesis\par -N/V - zofran, compazine\par \par \par \par RTC in 3 weeks

## 2022-08-31 NOTE — REASON FOR VISIT
[Procedure: _________] : a [unfilled] procedure visit [FreeTextEntry1] : Diagnosis of peritoneal carcinomatosis

## 2022-08-31 NOTE — HISTORY OF PRESENT ILLNESS
[FreeTextEntry1] : PRE CALL PRIOR TO APPOINTMENT:  \par \par  Phone Number:  [phone]\par \par  COVID-19 SWAB:  advised\par \par  RX on file:  yes\par \par  Referring MD:  Chi\par \par  Appointment date:  8/31\par \par  Clotting or Bleeding disorders:  no\par \par  PPM / Defibrillator:  no\par \par  NPO status advised:  na\par \par  History of fall:     no\par \par  Assistant device for walking:  no\par \par   home:  na\par \par  Blood Thinners:    no                          \par \par  Prescribing MD agree to have blood thinner medication held:  na\par \par  Capacity to make decisions: no\par \par  HCP:  no\par \par  DNR:  no\par \par  Person contact for Pre-Call:  Pt  by William\par \par  Crystal- Operative Assessment: (Day of Procedure) \par \par  NPO status: na\par \par  Accompanied by:  spouse\par \par  Falls risk:  no\par  \par Labs:  see attached\par \par  IVL:  \par \par  R MD: Dr. Lazara Mcgarry  \par \par  Urine Pregnancy: na\par \par  PRE-OP instructions: provided\par \par  POST-OP teaching initiated:  yes\par \par  Allergy bracelet on: na\par \par  Antibiotic given:na\par

## 2022-09-17 NOTE — HISTORY OF PRESENT ILLNESS
[FreeTextEntry1] : PRE CALL PRIOR TO APPOINTMENT:  \par \par  Phone Number: \par \par  COVID-19 SWAB:  advised to obtain\par \par  RX on file:  yes\par \par  Referring MD:  Law \par \par  Appointment date:  9/14\par \par  Currently on Chemotherapy:  no            \par \par  CBC within 48 hours:  WBC:       ANC:  \par \par  Diabetic:  no\par \par  Clotting or Bleeding disorders:  no\par \par  PPM / Defibrillator:  no\par \par  NPO status advised:  yes\par \par  History of fall:   no  \par \par  Assistant device for walking:  na\par \par   home:   yes\par \par  Blood Thinners:  no\par \par  Prescribing MD agree to have medication held:  n/a\par \par  Capacity to make decisions: yes\par \par  HCP:  no\par \par  DNR:  no\par \par  Person contact for Pre-Call:  Patient, KK 9/9\par \par  \par \par  Guidelines for Screening Anesthesia / Sedation Cases	(TYPE YES OR NO)  \par \par  Obtain Prescription / Authorization from Referring Physician: YES	 \par \par Medical Necessity (Justification of the need for Anesthesia / Sedation) from referring Physician: YES	 \par \par  Have you taken oral sedation? (e.g. Xanax, Valium, and other oral sedatives):  NO	 \par \par  Clearance to receive Anesthesia / Sedation: YES- BY Dr. Kuhn 	 \par \par  \par \par  ANESTHESIA EXCLUSION CRITERIA QUESTIONNAIRE:	 \par \par Difficult Airway: (TYPE YES OR NO) no	 \par \par Previous Problem with Anesthesia or sedation: NO	 \par \par  History of Difficult IntubatioN: NO	 \par \par  Stridor, Snoring, Sleep Apnea: yes \par \par  Tonsils / Adenoids present: YES	 \par \par  Dysmorphic Facial Features: NO	 \par \par  Cervical Spine Fusion/ Cervical Spine Surgery: NO	 \par \par  Tumor in Airway: NO	 \par \par  Trauma to Airway: NO	 \par \par  Radiation therapy to head / neck: NO	 \par \par  Advanced Rheumatoid ArthritiS: NO	 \par \par  Active Cardiac Ischemia (as defined by EKG or Laboratory  Examination): NO	 \par \par  Severe COPD / Home O2: NO	 \par \par  Known or Suspected Increased Intracranial pressure: NO	 \par \par  Patient with BMI of 40 or greater : NO    Weight (kgs.) 158lbs  Height (ft.) 5'2       BMI 29.3	 	 \par \par  Patients with Increased Risk of Aspiration: (TYPE YES OR NO) 		 \par \par  Abnormal Airway: NO	 \par \par  Hiatal Hernia with Reflux: NO	 \par \par  Pregnancy: NO	 	 \par \par  Altered Mental State: NO 	 	 \par \par  Spinal Cord Injury with Paraplegia or Quadriplegia: NO	 	 \par \par  History of delayed Gastric Emptying: NO 	 	 \par \par  ASA Physical Status of 3 or greater (See Appendix 1 from policy [id number].5502) 	 	 \par \par \par Malignant Hyperthermia Screening: (TYPE YES OR NO) 	 \par \par Family history unexpected death following anesthesia: NO	 \par \par  Family or personal history of Malignant Hyperthermia: NO  	 \par \par   A muscle or neuromuscular disorder: NO 	 \par \par  \par \par  Crystal -Operative Assessment ( Day of Procedure)  \par  \par  Procedure: Mediport \par \par  Indication:  Rectal cancer\par \par  NPO status: 930p\par \par  Accompanied by:  Nash [phone]\par \par  Falls risk:  no\par \par  Labs: see chart  \par \par  IVL: 20g rac\par \par  IR MD: Dr. Lazara Mcgarry  \par \par  Urine Pregnancy: na\par \par  Pre-Op instructions: provided\par \par  POST-OP teaching initiated:  yes\par \par  Allergy bracelet on: nkda\par \par  Anesthesia plan discussed with IR MD:  yes\par \par  Antibiotic given: yes\par \par

## 2022-09-25 NOTE — ED PROVIDER NOTE - PATIENT PORTAL LINK FT
You can access the FollowMyHealth Patient Portal offered by [facility] by registering at the following website: [email or web address]. By joining [employer]’s FollowMyHealth portal, you will also be able to view your health information using other applications (apps) compatible with our system.

## 2022-09-25 NOTE — ED PROVIDER NOTE - PHYSICAL EXAMINATION
Const: Awake, alert and oriented. In no acute distress. Well appearing.  HEENT: NC/AT. Moist mucous membranes.  Eyes: No scleral icterus. EOMI.  Neck:. Soft and supple. Full ROM without pain.  Cardiac: Regular rate and regular rhythm. +S1/S2. Peripheral pulses 2+ and symmetric. No LE edema.  Resp: Speaking in full sentences. No evidence of respiratory distress. No wheezes, rales or rhonchi.  Abd: Soft, mild distension and diffuse ttp. Normal bowel sounds in all 4 quadrants. No guarding or rebound.  Back: Spine midline and non-tender. No CVAT.  Skin: No rashes, abrasions or lacerations.  Lymph: No cervical lymphadenopathy.  Neuro: Awake, alert & oriented x 3. Moves all extremities symmetrically.

## 2022-09-25 NOTE — ED ADULT TRIAGE NOTE - CHIEF COMPLAINT QUOTE
pt c/o fluid buildup in the abdomen, pt has rectal ca, currently on chemo last dose was Thursday, MD Foy pts oncologist told pt to come in for possible paracentesis. pt c/o abdominal discomfort.

## 2022-09-25 NOTE — ED ADULT NURSE NOTE - OBJECTIVE STATEMENT
pt has rectal ca, currently on chemo last dose was Thursday, MD Foy pts oncologist told pt to come in for possible paracentesis. pt c/o abdominal discomfort.

## 2022-09-25 NOTE — ED STATDOCS - PROGRESS NOTE DETAILS
56 y/o female with a PMHx of rectal CA (chemo - followed by Dr. Foy), presents to the ED c/o abdominal pain and feeling uncomfortable. Last chemo was last Thursday. Denies fever or vomiting. Last BM today while in ED. Pt to be moved to main ED for complete evaluation by another provider. 58 y/o female with a PMHx of rectal CA (chemo - followed by Dr. Foy), presents to the ED c/o abdominal distension and feeling uncomfortable; requesting paracentesis. Had paracentesis aprrox 2 weeks ago.  Last chemo was last Thursday. Denies fever or vomiting. Last BM today while in ED. Pt to be moved to main ED for complete evaluation by another provider.

## 2022-09-25 NOTE — ED PROVIDER NOTE - OBJECTIVE STATEMENT
56yo female with pmh of metastatic colon cancer presents with abd distension. Pt states she had para done by IR about 2-3 weeks ago and since then with progressive abd distension and feeling pressure. Pt states she lays down she feels sob and at times steward. Pt called Dr. Calabrese (onc) and told to come to ED. Pt denies fevers/chills, ha, loc, focal neuro deficits, cp/palp, cough, n/v/d, urinary symptoms, recent travel.

## 2022-09-25 NOTE — ED PROVIDER NOTE - CLINICAL SUMMARY MEDICAL DECISION MAKING FREE TEXT BOX
56yo female with pmh of metastatic colon cancer presents with abd distension 56yo female with pmh of metastatic colon cancer presents with abd distension pt pain resolved with pain meds, small ascites on CT no large area on bedside US to tap, labs at baseline, pt with no chest or sob here, stable for dc with follow up

## 2022-09-28 NOTE — ASSESSMENT
[FreeTextEntry1] : 56 yo F with no significant PMH who was diagnosed with rectal cancer in August 2022.\par Colonoscopy on 8/16/2 due to a change in bowel habits which showed a near obstructing rectal mass from 6 cm above the anal verge to approximately 12 cm. Biopsies of the mass consistent with poorly differentiated adenocarcinoma with signet ring cells and focal mucin. \par Staging CT A/P demonstrated extensive ~ 6cm rectal mass with infiltration of the pelvic sidewalls. There was also findings of large amount of ascitic fluid consistent with malignant ascites as well as omental caking and peritoneal carcinomatosis. Cirrhotic liver. No focal masses. CEA 65. \par \par # colon cancer with carcinomatosis\par -Tumor is RAFFI\par -NGS from [facility] showed KRAS and NRAS\par -started FOLFOX on 9/20/22\par -Dr. Foy will plan to add cetuximab (if NRAS wild type) or Jami pending his review of NGS results\par -Dr. Foy discussed GIANT trial a phase III randomized trial with high dose VIT D vs  standard dose Vit D in combination with chemo. Pt will think about it\par -s/p port placement\par -still need to obtain CT chest record\par -RTC Q2 weeks for treatment and Q4 weeks for MD/PA follow up\par \par # supportive\par -Ascites - if distention worsens, will arrange for paracentesis\par -N/V - zofran, compazine\par -imodium and/or miralax as needed for intermittent diarrhea and intermittent constipation\par -OTC pain meds for abdominal and rectal discomfort.  Will order oxycodone if needed

## 2022-09-28 NOTE — HISTORY OF PRESENT ILLNESS
[de-identified] : 58 yo F with no significant PMH who was diagnosed with rectal cancer in August 2022.\par \par She had a screening colonoscopy on 8/16/2 due to a change in bowel habits which showed a near obstructing rectal mass from 6 cm above the anal verge to approximately 12 cm. Biopsies of the mass consistent with poorly differentiated adenocarcinoma with signet ring cells and focal mucin. \par \par Staging CT A/P demonstrated extensive ~ 6cm rectal mass with infiltration of the pelvic sidewalls. There was also findings of large amount of ascitic fluid consistent with malignant ascites as well as omental caking and peritoneal carcinomatosis. Cirrhotic liver. No focal masses. CEA 65. \par \par She saw colorectal surgery, Dr. Sara López, and systemic treatment was recommended. She reports being able to pass bowel movements, but with difficulty and not consistently. Complains of abd distention, worsened compared to 2 weeks ago. No family history of colon or rectal cancer. 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 She normaly stays active by [email or web address] and goes to the gym. \par \par former smoker\par \par social drinking\par \par  [de-identified] : Patient presents on C1D9 FOLFOX for SIV rectal cancer with peritoneal carcinomatosis.\par + Abdominal discomfort from ascites. Recent CT at ED visit showed only mild to moderate ascites. + Rectal discomfort. + Cold intolerance. + Mild fatigue. + Intermittent diarrhea and intermittent constipation. No mucositis. No H/F syndrome. No neuropathy. No fevers, cough or SOB. \par

## 2022-11-15 NOTE — HISTORY OF PRESENT ILLNESS
[Disease: _____________________] : Disease: [unfilled] [AJCC Stage: ____] : AJCC Stage: [unfilled] [de-identified] : 58 yo F with no significant PMH who was diagnosed with rectal cancer in August 2022.\par \par She had a screening colonoscopy on 8/16/2 due to a change in bowel habits which showed a near obstructing rectal mass from 6 cm above the anal verge to approximately 12 cm. Biopsies of the mass consistent with poorly differentiated adenocarcinoma with signet ring cells and focal mucin. \par \par Staging CT A/P demonstrated extensive ~ 6cm rectal mass with infiltration of the pelvic sidewalls. There was also findings of large amount of ascitic fluid consistent with malignant ascites as well as omental caking and peritoneal carcinomatosis. Cirrhotic liver. No focal masses. CEA 65. \par \par She saw colorectal surgery, Dr. Sara López, and systemic treatment was recommended. She reports being able to pass bowel movements, but with difficulty and not consistently. Complains of abd distention, worsened compared to 2 weeks ago. No family history of colon or rectal cancer. 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 She normaly stays active by [employer] and goes to the gym. \par \par former smoker\par \par social drinking\par \par  [de-identified] : NRAS G13D positive \par PD-L1 negative, TPS % = 0 \par \par MSI - equivocal [de-identified] : Patient presents on C4D1 FOLFOX for SIV rectal cancer with peritoneal carcinomatosis.\par + Abdominal discomfort from ascites, improved, still distended but denies any pain. + Rectal discomfort, improved. + Cold intolerance, unchanged. + Mild neuropathy not associated with cold. + Mild fatigue, unchanged. + Intermittent diarrhea and intermittent constipation, slightly more frequent constipation. No mucositis. No H/F syndrome.  No fevers, cough or SOB. \par

## 2022-11-15 NOTE — ASSESSMENT
[FreeTextEntry1] : 56 yo F with no significant PMH who was diagnosed with rectal cancer in August 2022.\par Colonoscopy on 8/16/2 due to a change in bowel habits which showed a near obstructing rectal mass from 6 cm above the anal verge to approximately 12 cm. Biopsies of the mass consistent with poorly differentiated adenocarcinoma with signet ring cells and focal mucin. \par Staging CT A/P demonstrated extensive ~ 6cm rectal mass with infiltration of the pelvic sidewalls. There was also findings of large amount of ascitic fluid consistent with malignant ascites as well as omental caking and peritoneal carcinomatosis. Cirrhotic liver. No focal masses. CEA 65. \par \par # colon cancer with carcinomatosis\par -Tumor is RAFFI\par -started FOLFOX on 9/20/22\par -NGS from [facility] showed KRAS and NRAS  >> will add Jami\par -PD-L1 negative, TPS % = 0 \par -RTC Q2 weeks for treatment and Q4 weeks for MD/PA follow up\par \par # supportive\par -Ascites - if distention worsens, will arrange for paracentesis\par -N/V - zofran, compazine\par -imodium and/or miralax as needed for intermittent diarrhea and intermittent constipation. Will add colace as well.\par -OTC pain meds for abdominal and rectal discomfort.  Will order oxycodone if needed

## 2022-11-29 PROBLEM — E87.6 HYPOKALEMIA: Status: ACTIVE | Noted: 2022-01-01

## 2022-12-15 PROBLEM — R18.0 MALIGNANT ASCITES: Status: ACTIVE | Noted: 2022-01-01

## 2022-12-15 NOTE — HISTORY OF PRESENT ILLNESS
[Disease: _____________________] : Disease: [unfilled] [AJCC Stage: ____] : AJCC Stage: [unfilled] [de-identified] : 58 yo F with no significant PMH who was diagnosed with rectal cancer in August 2022.\par \par She had a screening colonoscopy on 8/16/2 due to a change in bowel habits which showed a near obstructing rectal mass from 6 cm above the anal verge to approximately 12 cm. Biopsies of the mass consistent with poorly differentiated adenocarcinoma with signet ring cells and focal mucin. \par \par Staging CT A/P demonstrated extensive ~ 6cm rectal mass with infiltration of the pelvic sidewalls. There was also findings of large amount of ascitic fluid consistent with malignant ascites as well as omental caking and peritoneal carcinomatosis. Cirrhotic liver. No focal masses. CEA 65. \par \par She saw colorectal surgery, Dr. Sara López, and systemic treatment was recommended. She reports being able to pass bowel movements, but with difficulty and not consistently. Complains of abd distention, worsened compared to 2 weeks ago. No family history of colon or rectal cancer. 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 She normaly stays active by [employer] and goes to the gym. \par \par former smoker\par \par social drinking\par \par  [de-identified] : NRAS G13D positive \par PD-L1 negative, TPS % = 0 \par \par MSI - equivocal [de-identified] : Patient presents on C6D3 FOLFOX for SIV rectal cancer with peritoneal carcinomatosis.\par + Abdominal discomfort from ascites, improved, still distended but denies any pain. + Rectal discomfort, improved. + Cold intolerance, unchanged. + Mild neuropathy not associated with cold. + Mild fatigue, unchanged. + Intermittent diarrhea and intermittent constipation, slightly more frequent constipation. No mucositis. No H/F syndrome.  No fevers, cough or SOB. \par

## 2022-12-15 NOTE — ASSESSMENT
[FreeTextEntry1] : 56 yo F with no significant PMH who was diagnosed with rectal cancer in August 2022.\par Colonoscopy on 8/16/2 due to a change in bowel habits which showed a near obstructing rectal mass from 6 cm above the anal verge to approximately 12 cm. Biopsies of the mass consistent with poorly differentiated adenocarcinoma with signet ring cells and focal mucin. \par Staging CT A/P demonstrated extensive ~ 6cm rectal mass with infiltration of the pelvic sidewalls. There was also findings of large amount of ascitic fluid consistent with malignant ascites as well as omental caking and peritoneal carcinomatosis. Cirrhotic liver. No focal masses. CEA 65. \par \par # colon cancer with carcinomatosis\par -Tumor is RAFFI\par -NGS from [facility] showed KRAS and NRAS  >> added Jami\par -PD-L1 negative, TPS % = 0 \par -On FOLFOX since 9/20/22 - tolerating very well\par -can stop oxali after C10 if neuropathy worsens and continue 5FU/LV + JAMI  >>>PoD >>> FOLFIRI\par -RTC Q2 weeks for treatment and Q4 weeks for MD/PA follow up\par \par \par # supportive\par -Ascites - if distention worsens, will arrange for paracentesis\par -N/V - zofran, compazine\par -imodium and/or miralax as needed for intermittent diarrhea and intermittent constipation.\par -OTC pain meds for abdominal and rectal discomfort.  Will order oxycodone if needed

## 2023-01-01 ENCOUNTER — APPOINTMENT (OUTPATIENT)
Dept: COLORECTAL SURGERY | Facility: CLINIC | Age: 58
End: 2023-01-01

## 2023-01-01 ENCOUNTER — INPATIENT (INPATIENT)
Facility: HOSPITAL | Age: 58
LOS: 6 days | Discharge: HOME | DRG: 329 | End: 2023-03-17
Attending: COLON & RECTAL SURGERY | Admitting: COLON & RECTAL SURGERY
Payer: COMMERCIAL

## 2023-01-01 ENCOUNTER — NON-APPOINTMENT (OUTPATIENT)
Age: 58
End: 2023-01-01

## 2023-01-01 ENCOUNTER — APPOINTMENT (OUTPATIENT)
Age: 58
End: 2023-01-01

## 2023-01-01 ENCOUNTER — INPATIENT (INPATIENT)
Facility: HOSPITAL | Age: 58
LOS: 1 days | Discharge: HOSPICE | DRG: 374 | End: 2023-04-09
Attending: HOSPITALIST | Admitting: FAMILY MEDICINE
Payer: OTHER MISCELLANEOUS

## 2023-01-01 ENCOUNTER — APPOINTMENT (OUTPATIENT)
Dept: HEMATOLOGY ONCOLOGY | Facility: CLINIC | Age: 58
End: 2023-01-01

## 2023-01-01 ENCOUNTER — APPOINTMENT (OUTPATIENT)
Dept: INTERVENTIONAL RADIOLOGY/VASCULAR | Facility: CLINIC | Age: 58
End: 2023-01-01
Payer: COMMERCIAL

## 2023-01-01 ENCOUNTER — LABORATORY RESULT (OUTPATIENT)
Age: 58
End: 2023-01-01

## 2023-01-01 ENCOUNTER — RESULT REVIEW (OUTPATIENT)
Age: 58
End: 2023-01-01

## 2023-01-01 ENCOUNTER — TRANSCRIPTION ENCOUNTER (OUTPATIENT)
Age: 58
End: 2023-01-01

## 2023-01-01 ENCOUNTER — OUTPATIENT (OUTPATIENT)
Dept: OUTPATIENT SERVICES | Facility: HOSPITAL | Age: 58
LOS: 1 days | End: 2023-01-01

## 2023-01-01 ENCOUNTER — EMERGENCY (EMERGENCY)
Facility: HOSPITAL | Age: 58
LOS: 1 days | Discharge: HOME | End: 2023-01-01
Attending: EMERGENCY MEDICINE
Payer: COMMERCIAL

## 2023-01-01 ENCOUNTER — APPOINTMENT (OUTPATIENT)
Dept: AFTER HOURS CARE | Facility: EMERGENCY ROOM | Age: 58
End: 2023-01-01
Payer: COMMERCIAL

## 2023-01-01 ENCOUNTER — OUTPATIENT (OUTPATIENT)
Dept: OUTPATIENT SERVICES | Facility: HOSPITAL | Age: 58
LOS: 1 days | End: 2023-01-01
Payer: COMMERCIAL

## 2023-01-01 ENCOUNTER — APPOINTMENT (OUTPATIENT)
Dept: HEMATOLOGY ONCOLOGY | Facility: CLINIC | Age: 58
End: 2023-01-01
Payer: COMMERCIAL

## 2023-01-01 ENCOUNTER — APPOINTMENT (OUTPATIENT)
Dept: CT IMAGING | Facility: CLINIC | Age: 58
End: 2023-01-01
Payer: COMMERCIAL

## 2023-01-01 ENCOUNTER — OUTPATIENT (OUTPATIENT)
Dept: OUTPATIENT SERVICES | Facility: HOSPITAL | Age: 58
LOS: 1 days | Discharge: HOME | End: 2023-01-01

## 2023-01-01 VITALS
HEART RATE: 77 BPM | DIASTOLIC BLOOD PRESSURE: 85 MMHG | SYSTOLIC BLOOD PRESSURE: 112 MMHG | OXYGEN SATURATION: 98 % | TEMPERATURE: 98.1 F | RESPIRATION RATE: 16 BRPM

## 2023-01-01 VITALS
HEART RATE: 83 BPM | SYSTOLIC BLOOD PRESSURE: 109 MMHG | DIASTOLIC BLOOD PRESSURE: 73 MMHG | OXYGEN SATURATION: 98 % | RESPIRATION RATE: 20 BRPM | TEMPERATURE: 98 F

## 2023-01-01 VITALS
WEIGHT: 134.92 LBS | DIASTOLIC BLOOD PRESSURE: 75 MMHG | HEIGHT: 62 IN | SYSTOLIC BLOOD PRESSURE: 106 MMHG | OXYGEN SATURATION: 100 % | RESPIRATION RATE: 18 BRPM | HEART RATE: 106 BPM

## 2023-01-01 VITALS
DIASTOLIC BLOOD PRESSURE: 64 MMHG | TEMPERATURE: 98 F | SYSTOLIC BLOOD PRESSURE: 93 MMHG | HEIGHT: 64.17 IN | HEART RATE: 88 BPM | OXYGEN SATURATION: 100 % | RESPIRATION RATE: 20 BRPM

## 2023-01-01 VITALS
HEART RATE: 88 BPM | RESPIRATION RATE: 18 BRPM | OXYGEN SATURATION: 96 % | DIASTOLIC BLOOD PRESSURE: 86 MMHG | SYSTOLIC BLOOD PRESSURE: 126 MMHG | TEMPERATURE: 98 F

## 2023-01-01 VITALS
HEIGHT: 62 IN | DIASTOLIC BLOOD PRESSURE: 76 MMHG | OXYGEN SATURATION: 95 % | WEIGHT: 117.02 LBS | HEART RATE: 67 BPM | BODY MASS INDEX: 21.53 KG/M2 | SYSTOLIC BLOOD PRESSURE: 111 MMHG

## 2023-01-01 VITALS
DIASTOLIC BLOOD PRESSURE: 67 MMHG | HEART RATE: 99 BPM | SYSTOLIC BLOOD PRESSURE: 96 MMHG | OXYGEN SATURATION: 99 % | RESPIRATION RATE: 18 BRPM

## 2023-01-01 VITALS
TEMPERATURE: 98.3 F | OXYGEN SATURATION: 98 % | DIASTOLIC BLOOD PRESSURE: 79 MMHG | HEART RATE: 93 BPM | RESPIRATION RATE: 16 BRPM | SYSTOLIC BLOOD PRESSURE: 108 MMHG

## 2023-01-01 VITALS
HEART RATE: 109 BPM | SYSTOLIC BLOOD PRESSURE: 94 MMHG | OXYGEN SATURATION: 95 % | TEMPERATURE: 98 F | RESPIRATION RATE: 16 BRPM | DIASTOLIC BLOOD PRESSURE: 61 MMHG | HEIGHT: 62.01 IN

## 2023-01-01 DIAGNOSIS — Z48.89 ENCOUNTER FOR OTHER SPECIFIED SURGICAL AFTERCARE: ICD-10-CM

## 2023-01-01 DIAGNOSIS — Z51.11 ENCOUNTER FOR ANTINEOPLASTIC CHEMOTHERAPY: ICD-10-CM

## 2023-01-01 DIAGNOSIS — K56.609 UNSPECIFIED INTESTINAL OBSTRUCTION, UNSPECIFIED AS TO PARTIAL VERSUS COMPLETE OBSTRUCTION: ICD-10-CM

## 2023-01-01 DIAGNOSIS — G89.18 OTHER ACUTE POSTPROCEDURAL PAIN: ICD-10-CM

## 2023-01-01 DIAGNOSIS — R62.7 ADULT FAILURE TO THRIVE: ICD-10-CM

## 2023-01-01 DIAGNOSIS — C78.6 SECONDARY MALIGNANT NEOPLASM OF RETROPERITONEUM AND PERITONEUM: ICD-10-CM

## 2023-01-01 DIAGNOSIS — T67.01XA HEATSTROKE AND SUNSTROKE, INT ENC: ICD-10-CM

## 2023-01-01 DIAGNOSIS — C20 MALIGNANT NEOPLASM OF RECTUM: ICD-10-CM

## 2023-01-01 DIAGNOSIS — R11.2 NAUSEA WITH VOMITING, UNSPECIFIED: ICD-10-CM

## 2023-01-01 DIAGNOSIS — R18.0 MALIGNANT ASCITES: ICD-10-CM

## 2023-01-01 DIAGNOSIS — Z51.89 ENCOUNTER FOR OTHER SPECIFIED AFTERCARE: ICD-10-CM

## 2023-01-01 DIAGNOSIS — R19.7 DIARRHEA, UNSPECIFIED: ICD-10-CM

## 2023-01-01 LAB
ABO RH CONFIRMATION: SIGNIFICANT CHANGE UP
ALBUMIN FLD-MCNC: 2.7 G/DL — SIGNIFICANT CHANGE UP
ALBUMIN SERPL ELPH-MCNC: 2.4 G/DL — LOW (ref 3.3–5.2)
ALBUMIN SERPL ELPH-MCNC: 2.5 G/DL — LOW (ref 3.3–5.2)
ALBUMIN SERPL ELPH-MCNC: 3 G/DL — LOW (ref 3.3–5.2)
ALBUMIN SERPL ELPH-MCNC: 3.2 G/DL — LOW (ref 3.3–5.2)
ALBUMIN SERPL ELPH-MCNC: 3.5 G/DL — SIGNIFICANT CHANGE UP (ref 3.3–5)
ALBUMIN SERPL ELPH-MCNC: 3.6 G/DL — SIGNIFICANT CHANGE UP (ref 3.3–5)
ALBUMIN SERPL ELPH-MCNC: 3.7 G/DL — SIGNIFICANT CHANGE UP (ref 3.3–5)
ALBUMIN SERPL ELPH-MCNC: 3.7 G/DL — SIGNIFICANT CHANGE UP (ref 3.3–5)
ALBUMIN SERPL ELPH-MCNC: 3.8 G/DL — SIGNIFICANT CHANGE UP (ref 3.3–5)
ALBUMIN SERPL ELPH-MCNC: 3.9 G/DL
ALBUMIN SERPL ELPH-MCNC: 3.9 G/DL — SIGNIFICANT CHANGE UP (ref 3.3–5.2)
ALBUMIN SERPL ELPH-MCNC: 4 G/DL — SIGNIFICANT CHANGE UP (ref 3.3–5.2)
ALP BLD-CCNC: 103 U/L
ALP BLD-CCNC: 136 U/L
ALP BLD-CCNC: 186 U/L
ALP SERPL-CCNC: 113 U/L — SIGNIFICANT CHANGE UP (ref 40–120)
ALP SERPL-CCNC: 125 U/L — HIGH (ref 40–120)
ALP SERPL-CCNC: 125 U/L — HIGH (ref 40–120)
ALP SERPL-CCNC: 128 U/L — HIGH (ref 40–120)
ALP SERPL-CCNC: 131 U/L — HIGH (ref 40–120)
ALP SERPL-CCNC: 131 U/L — HIGH (ref 40–120)
ALP SERPL-CCNC: 165 U/L — HIGH (ref 40–120)
ALP SERPL-CCNC: 223 U/L — HIGH (ref 40–120)
ALP SERPL-CCNC: 85 U/L — SIGNIFICANT CHANGE UP (ref 40–120)
ALP SERPL-CCNC: 92 U/L — SIGNIFICANT CHANGE UP (ref 40–120)
ALP SERPL-CCNC: 97 U/L — SIGNIFICANT CHANGE UP (ref 40–120)
ALT FLD-CCNC: 11 U/L — SIGNIFICANT CHANGE UP (ref 10–45)
ALT FLD-CCNC: 12 U/L — SIGNIFICANT CHANGE UP
ALT FLD-CCNC: 12 U/L — SIGNIFICANT CHANGE UP (ref 10–45)
ALT FLD-CCNC: 13 U/L — SIGNIFICANT CHANGE UP
ALT FLD-CCNC: 13 U/L — SIGNIFICANT CHANGE UP (ref 10–45)
ALT FLD-CCNC: 15 U/L — SIGNIFICANT CHANGE UP
ALT FLD-CCNC: 19 U/L — SIGNIFICANT CHANGE UP
ALT FLD-CCNC: 23 U/L — SIGNIFICANT CHANGE UP (ref 10–45)
ALT FLD-CCNC: 8 U/L — LOW (ref 10–45)
ALT FLD-CCNC: <5 U/L — SIGNIFICANT CHANGE UP
ALT FLD-CCNC: <5 U/L — SIGNIFICANT CHANGE UP
ALT SERPL-CCNC: 11 U/L
ALT SERPL-CCNC: 12 U/L
ALT SERPL-CCNC: 15 U/L
ANION GAP SERPL CALC-SCNC: 10 MMOL/L
ANION GAP SERPL CALC-SCNC: 10 MMOL/L — SIGNIFICANT CHANGE UP (ref 5–17)
ANION GAP SERPL CALC-SCNC: 11 MMOL/L
ANION GAP SERPL CALC-SCNC: 11 MMOL/L — SIGNIFICANT CHANGE UP (ref 5–17)
ANION GAP SERPL CALC-SCNC: 13 MMOL/L — SIGNIFICANT CHANGE UP (ref 5–17)
ANION GAP SERPL CALC-SCNC: 14 MMOL/L — SIGNIFICANT CHANGE UP (ref 5–17)
ANION GAP SERPL CALC-SCNC: 15 MMOL/L
ANION GAP SERPL CALC-SCNC: 16 MMOL/L — SIGNIFICANT CHANGE UP (ref 5–17)
ANION GAP SERPL CALC-SCNC: 7 MMOL/L — SIGNIFICANT CHANGE UP (ref 5–17)
ANION GAP SERPL CALC-SCNC: 8 MMOL/L — SIGNIFICANT CHANGE UP (ref 5–17)
ANION GAP SERPL CALC-SCNC: 9 MMOL/L — SIGNIFICANT CHANGE UP (ref 5–17)
ANISOCYTOSIS BLD QL: SLIGHT — SIGNIFICANT CHANGE UP
APPEARANCE UR: ABNORMAL
APPEARANCE UR: ABNORMAL
APTT BLD: 28.3 SEC — SIGNIFICANT CHANGE UP (ref 27.5–35.5)
APTT BLD: 33.4 SEC — SIGNIFICANT CHANGE UP (ref 27.5–35.5)
APTT BLD: 41.2 SEC — HIGH (ref 27.5–35.5)
AST SERPL-CCNC: 11 U/L — SIGNIFICANT CHANGE UP
AST SERPL-CCNC: 13 U/L — SIGNIFICANT CHANGE UP
AST SERPL-CCNC: 23 U/L — SIGNIFICANT CHANGE UP
AST SERPL-CCNC: 26 U/L — SIGNIFICANT CHANGE UP
AST SERPL-CCNC: 28 U/L — SIGNIFICANT CHANGE UP (ref 10–40)
AST SERPL-CCNC: 30 U/L
AST SERPL-CCNC: 31 U/L
AST SERPL-CCNC: 31 U/L — SIGNIFICANT CHANGE UP (ref 10–40)
AST SERPL-CCNC: 32 U/L — SIGNIFICANT CHANGE UP (ref 10–40)
AST SERPL-CCNC: 36 U/L — HIGH
AST SERPL-CCNC: 37 U/L
AST SERPL-CCNC: 37 U/L — SIGNIFICANT CHANGE UP (ref 10–40)
AST SERPL-CCNC: 41 U/L — HIGH
AST SERPL-CCNC: 47 U/L — HIGH (ref 10–40)
B PERT IGG+IGM PNL SER: CLEAR — SIGNIFICANT CHANGE UP
BACTERIA # UR AUTO: ABNORMAL
BACTERIA # UR AUTO: NEGATIVE — SIGNIFICANT CHANGE UP
BASO STIPL BLD QL SMEAR: PRESENT — SIGNIFICANT CHANGE UP
BASOPHILS # BLD AUTO: 0 K/UL — SIGNIFICANT CHANGE UP (ref 0–0.2)
BASOPHILS # BLD AUTO: 0 K/UL — SIGNIFICANT CHANGE UP (ref 0–0.2)
BASOPHILS # BLD AUTO: 0.02 K/UL — SIGNIFICANT CHANGE UP (ref 0–0.2)
BASOPHILS # BLD AUTO: 0.03 K/UL — SIGNIFICANT CHANGE UP (ref 0–0.2)
BASOPHILS # BLD AUTO: 0.07 K/UL — SIGNIFICANT CHANGE UP (ref 0–0.2)
BASOPHILS # BLD AUTO: 0.09 K/UL — SIGNIFICANT CHANGE UP (ref 0–0.2)
BASOPHILS # BLD AUTO: 0.1 K/UL — SIGNIFICANT CHANGE UP (ref 0–0.2)
BASOPHILS # BLD AUTO: 0.13 K/UL — SIGNIFICANT CHANGE UP (ref 0–0.2)
BASOPHILS NFR BLD AUTO: 0 % — SIGNIFICANT CHANGE UP (ref 0–2)
BASOPHILS NFR BLD AUTO: 0 % — SIGNIFICANT CHANGE UP (ref 0–2)
BASOPHILS NFR BLD AUTO: 0.2 % — SIGNIFICANT CHANGE UP (ref 0–2)
BASOPHILS NFR BLD AUTO: 0.3 % — SIGNIFICANT CHANGE UP (ref 0–2)
BASOPHILS NFR BLD AUTO: 0.5 % — SIGNIFICANT CHANGE UP (ref 0–2)
BASOPHILS NFR BLD AUTO: 0.6 % — SIGNIFICANT CHANGE UP (ref 0–2)
BASOPHILS NFR BLD AUTO: 0.9 % — SIGNIFICANT CHANGE UP (ref 0–2)
BASOPHILS NFR BLD AUTO: 0.9 % — SIGNIFICANT CHANGE UP (ref 0–2)
BASOPHILS NFR BLD AUTO: 1 % — SIGNIFICANT CHANGE UP (ref 0–2)
BASOPHILS NFR BLD AUTO: 2 % — SIGNIFICANT CHANGE UP (ref 0–2)
BASOPHILS NFR BLD AUTO: 3 % — HIGH (ref 0–2)
BILIRUB DIRECT SERPL-MCNC: 0.1 MG/DL — SIGNIFICANT CHANGE UP (ref 0–0.3)
BILIRUB INDIRECT FLD-MCNC: 0.3 MG/DL — SIGNIFICANT CHANGE UP (ref 0.2–1)
BILIRUB SERPL-MCNC: 0.4 MG/DL — SIGNIFICANT CHANGE UP (ref 0.2–1.2)
BILIRUB SERPL-MCNC: 0.4 MG/DL — SIGNIFICANT CHANGE UP (ref 0.4–2)
BILIRUB SERPL-MCNC: 0.4 MG/DL — SIGNIFICANT CHANGE UP (ref 0.4–2)
BILIRUB SERPL-MCNC: 0.5 MG/DL
BILIRUB SERPL-MCNC: 0.5 MG/DL — SIGNIFICANT CHANGE UP (ref 0.2–1.2)
BILIRUB SERPL-MCNC: 0.5 MG/DL — SIGNIFICANT CHANGE UP (ref 0.2–1.2)
BILIRUB SERPL-MCNC: 0.6 MG/DL
BILIRUB SERPL-MCNC: 0.6 MG/DL
BILIRUB SERPL-MCNC: 0.6 MG/DL — SIGNIFICANT CHANGE UP (ref 0.2–1.2)
BILIRUB SERPL-MCNC: 0.6 MG/DL — SIGNIFICANT CHANGE UP (ref 0.2–1.2)
BILIRUB SERPL-MCNC: 0.6 MG/DL — SIGNIFICANT CHANGE UP (ref 0.4–2)
BILIRUB SERPL-MCNC: 0.7 MG/DL — SIGNIFICANT CHANGE UP (ref 0.4–2)
BILIRUB UR-MCNC: NEGATIVE — SIGNIFICANT CHANGE UP
BILIRUB UR-MCNC: NEGATIVE — SIGNIFICANT CHANGE UP
BLD GP AB SCN SERPL QL: SIGNIFICANT CHANGE UP
BUN SERPL-MCNC: 10 MG/DL
BUN SERPL-MCNC: 10 MG/DL — SIGNIFICANT CHANGE UP (ref 7–23)
BUN SERPL-MCNC: 10.5 MG/DL — SIGNIFICANT CHANGE UP (ref 8–20)
BUN SERPL-MCNC: 11 MG/DL
BUN SERPL-MCNC: 11.7 MG/DL — SIGNIFICANT CHANGE UP (ref 8–20)
BUN SERPL-MCNC: 12 MG/DL
BUN SERPL-MCNC: 12 MG/DL — SIGNIFICANT CHANGE UP (ref 8–20)
BUN SERPL-MCNC: 12.5 MG/DL — SIGNIFICANT CHANGE UP (ref 8–20)
BUN SERPL-MCNC: 13 MG/DL — SIGNIFICANT CHANGE UP (ref 7–23)
BUN SERPL-MCNC: 14.4 MG/DL — SIGNIFICANT CHANGE UP (ref 8–20)
BUN SERPL-MCNC: 14.5 MG/DL — SIGNIFICANT CHANGE UP (ref 8–20)
BUN SERPL-MCNC: 14.9 MG/DL — SIGNIFICANT CHANGE UP (ref 8–20)
BUN SERPL-MCNC: 15.6 MG/DL — SIGNIFICANT CHANGE UP (ref 8–20)
BUN SERPL-MCNC: 4.5 MG/DL — LOW (ref 8–20)
BUN SERPL-MCNC: 5.2 MG/DL — LOW (ref 8–20)
BUN SERPL-MCNC: 5.5 MG/DL — LOW (ref 8–20)
BUN SERPL-MCNC: 7 MG/DL — SIGNIFICANT CHANGE UP (ref 7–23)
BUN SERPL-MCNC: 7.4 MG/DL — LOW (ref 8–20)
BUN SERPL-MCNC: 7.5 MG/DL — LOW (ref 8–20)
BUN SERPL-MCNC: 8 MG/DL — SIGNIFICANT CHANGE UP (ref 7–23)
BUN SERPL-MCNC: 9 MG/DL — SIGNIFICANT CHANGE UP (ref 7–23)
BUN SERPL-MCNC: 9 MG/DL — SIGNIFICANT CHANGE UP (ref 8–20)
BUN SERPL-MCNC: 9.4 MG/DL — SIGNIFICANT CHANGE UP (ref 8–20)
BUN SERPL-MCNC: 9.8 MG/DL — SIGNIFICANT CHANGE UP (ref 8–20)
C DIFF BY PCR RESULT: SIGNIFICANT CHANGE UP
CALCIUM SERPL-MCNC: 10.2 MG/DL — SIGNIFICANT CHANGE UP (ref 8.4–10.5)
CALCIUM SERPL-MCNC: 7.6 MG/DL — LOW (ref 8.4–10.5)
CALCIUM SERPL-MCNC: 7.7 MG/DL — LOW (ref 8.4–10.5)
CALCIUM SERPL-MCNC: 7.7 MG/DL — LOW (ref 8.4–10.5)
CALCIUM SERPL-MCNC: 7.8 MG/DL — LOW (ref 8.4–10.5)
CALCIUM SERPL-MCNC: 8 MG/DL — LOW (ref 8.4–10.5)
CALCIUM SERPL-MCNC: 8.1 MG/DL — LOW (ref 8.4–10.5)
CALCIUM SERPL-MCNC: 8.2 MG/DL — LOW (ref 8.4–10.5)
CALCIUM SERPL-MCNC: 8.3 MG/DL — LOW (ref 8.4–10.5)
CALCIUM SERPL-MCNC: 8.3 MG/DL — LOW (ref 8.4–10.5)
CALCIUM SERPL-MCNC: 8.4 MG/DL — SIGNIFICANT CHANGE UP (ref 8.4–10.5)
CALCIUM SERPL-MCNC: 8.4 MG/DL — SIGNIFICANT CHANGE UP (ref 8.4–10.5)
CALCIUM SERPL-MCNC: 8.5 MG/DL — SIGNIFICANT CHANGE UP (ref 8.4–10.5)
CALCIUM SERPL-MCNC: 8.5 MG/DL — SIGNIFICANT CHANGE UP (ref 8.4–10.5)
CALCIUM SERPL-MCNC: 8.6 MG/DL — SIGNIFICANT CHANGE UP (ref 8.4–10.5)
CALCIUM SERPL-MCNC: 8.6 MG/DL — SIGNIFICANT CHANGE UP (ref 8.4–10.5)
CALCIUM SERPL-MCNC: 8.7 MG/DL — SIGNIFICANT CHANGE UP (ref 8.4–10.5)
CALCIUM SERPL-MCNC: 8.9 MG/DL — SIGNIFICANT CHANGE UP (ref 8.4–10.5)
CALCIUM SERPL-MCNC: 9.2 MG/DL — SIGNIFICANT CHANGE UP (ref 8.4–10.5)
CALCIUM SERPL-MCNC: 9.2 MG/DL — SIGNIFICANT CHANGE UP (ref 8.4–10.5)
CALCIUM SERPL-MCNC: 9.3 MG/DL
CALCIUM SERPL-MCNC: 9.6 MG/DL
CALCIUM SERPL-MCNC: 9.6 MG/DL
CALCIUM SERPL-MCNC: 9.7 MG/DL — SIGNIFICANT CHANGE UP (ref 8.4–10.5)
CEA SERPL-MCNC: 25.7 NG/ML
CHLORIDE SERPL-SCNC: 100 MMOL/L — SIGNIFICANT CHANGE UP (ref 96–108)
CHLORIDE SERPL-SCNC: 101 MMOL/L — SIGNIFICANT CHANGE UP (ref 96–108)
CHLORIDE SERPL-SCNC: 103 MMOL/L — SIGNIFICANT CHANGE UP (ref 96–108)
CHLORIDE SERPL-SCNC: 103 MMOL/L — SIGNIFICANT CHANGE UP (ref 96–108)
CHLORIDE SERPL-SCNC: 104 MMOL/L — SIGNIFICANT CHANGE UP (ref 96–108)
CHLORIDE SERPL-SCNC: 105 MMOL/L — SIGNIFICANT CHANGE UP (ref 96–108)
CHLORIDE SERPL-SCNC: 107 MMOL/L — SIGNIFICANT CHANGE UP (ref 96–108)
CHLORIDE SERPL-SCNC: 90 MMOL/L — LOW (ref 96–108)
CHLORIDE SERPL-SCNC: 92 MMOL/L — LOW (ref 96–108)
CHLORIDE SERPL-SCNC: 95 MMOL/L — LOW (ref 96–108)
CHLORIDE SERPL-SCNC: 97 MMOL/L
CHLORIDE SERPL-SCNC: 97 MMOL/L — SIGNIFICANT CHANGE UP (ref 96–108)
CHLORIDE SERPL-SCNC: 97 MMOL/L — SIGNIFICANT CHANGE UP (ref 96–108)
CHLORIDE SERPL-SCNC: 98 MMOL/L — SIGNIFICANT CHANGE UP (ref 96–108)
CHLORIDE SERPL-SCNC: 99 MMOL/L — SIGNIFICANT CHANGE UP (ref 96–108)
CHLORIDE SERPL-SCNC: 99 MMOL/L — SIGNIFICANT CHANGE UP (ref 96–108)
CO2 SERPL-SCNC: 20 MMOL/L — LOW (ref 22–29)
CO2 SERPL-SCNC: 22 MMOL/L — SIGNIFICANT CHANGE UP (ref 22–29)
CO2 SERPL-SCNC: 22 MMOL/L — SIGNIFICANT CHANGE UP (ref 22–29)
CO2 SERPL-SCNC: 23 MMOL/L — SIGNIFICANT CHANGE UP (ref 22–29)
CO2 SERPL-SCNC: 23 MMOL/L — SIGNIFICANT CHANGE UP (ref 22–29)
CO2 SERPL-SCNC: 24 MMOL/L — SIGNIFICANT CHANGE UP (ref 22–29)
CO2 SERPL-SCNC: 25 MMOL/L — SIGNIFICANT CHANGE UP (ref 22–29)
CO2 SERPL-SCNC: 25 MMOL/L — SIGNIFICANT CHANGE UP (ref 22–29)
CO2 SERPL-SCNC: 25 MMOL/L — SIGNIFICANT CHANGE UP (ref 22–31)
CO2 SERPL-SCNC: 25 MMOL/L — SIGNIFICANT CHANGE UP (ref 22–31)
CO2 SERPL-SCNC: 26 MMOL/L — SIGNIFICANT CHANGE UP (ref 22–29)
CO2 SERPL-SCNC: 26 MMOL/L — SIGNIFICANT CHANGE UP (ref 22–31)
CO2 SERPL-SCNC: 27 MMOL/L — SIGNIFICANT CHANGE UP (ref 22–29)
CO2 SERPL-SCNC: 27 MMOL/L — SIGNIFICANT CHANGE UP (ref 22–29)
CO2 SERPL-SCNC: 27 MMOL/L — SIGNIFICANT CHANGE UP (ref 22–31)
CO2 SERPL-SCNC: 27 MMOL/L — SIGNIFICANT CHANGE UP (ref 22–31)
CO2 SERPL-SCNC: 28 MMOL/L
CO2 SERPL-SCNC: 28 MMOL/L
CO2 SERPL-SCNC: 29 MMOL/L
COD CRY URNS QL: ABNORMAL
COD CRY URNS QL: ABNORMAL
COLOR FLD: YELLOW
COLOR SPEC: ABNORMAL
COLOR SPEC: ABNORMAL
COMMENT - URINE: SIGNIFICANT CHANGE UP
COMMENT - URINE: SIGNIFICANT CHANGE UP
CREAT SERPL-MCNC: 0.32 MG/DL — LOW (ref 0.5–1.3)
CREAT SERPL-MCNC: 0.32 MG/DL — LOW (ref 0.5–1.3)
CREAT SERPL-MCNC: 0.35 MG/DL — LOW (ref 0.5–1.3)
CREAT SERPL-MCNC: 0.37 MG/DL — LOW (ref 0.5–1.3)
CREAT SERPL-MCNC: 0.39 MG/DL — LOW (ref 0.5–1.3)
CREAT SERPL-MCNC: 0.39 MG/DL — LOW (ref 0.5–1.3)
CREAT SERPL-MCNC: 0.4 MG/DL — LOW (ref 0.5–1.3)
CREAT SERPL-MCNC: 0.41 MG/DL — LOW (ref 0.5–1.3)
CREAT SERPL-MCNC: 0.43 MG/DL — LOW (ref 0.5–1.3)
CREAT SERPL-MCNC: 0.49 MG/DL — LOW (ref 0.5–1.3)
CREAT SERPL-MCNC: 0.5 MG/DL — SIGNIFICANT CHANGE UP (ref 0.5–1.3)
CREAT SERPL-MCNC: 0.51 MG/DL — SIGNIFICANT CHANGE UP (ref 0.5–1.3)
CREAT SERPL-MCNC: 0.53 MG/DL — SIGNIFICANT CHANGE UP (ref 0.5–1.3)
CREAT SERPL-MCNC: 0.54 MG/DL — SIGNIFICANT CHANGE UP (ref 0.5–1.3)
CREAT SERPL-MCNC: 0.56 MG/DL
CREAT SERPL-MCNC: 0.58 MG/DL
CREAT SERPL-MCNC: 0.6 MG/DL — SIGNIFICANT CHANGE UP (ref 0.5–1.3)
CREAT SERPL-MCNC: 0.64 MG/DL
CREAT SERPL-MCNC: 0.64 MG/DL — SIGNIFICANT CHANGE UP (ref 0.5–1.3)
CULTURE RESULTS: SIGNIFICANT CHANGE UP
DIFF PNL FLD: NEGATIVE — SIGNIFICANT CHANGE UP
DIFF PNL FLD: NEGATIVE — SIGNIFICANT CHANGE UP
DOHLE BOD BLD QL SMEAR: PRESENT — SIGNIFICANT CHANGE UP
DOHLE BOD BLD QL SMEAR: PRESENT — SIGNIFICANT CHANGE UP
EGFR: 102 ML/MIN/1.73M2
EGFR: 102 ML/MIN/1.73M2 — SIGNIFICANT CHANGE UP
EGFR: 104 ML/MIN/1.73M2 — SIGNIFICANT CHANGE UP
EGFR: 105 ML/MIN/1.73M2
EGFR: 106 ML/MIN/1.73M2
EGFR: 107 ML/MIN/1.73M2 — SIGNIFICANT CHANGE UP
EGFR: 107 ML/MIN/1.73M2 — SIGNIFICANT CHANGE UP
EGFR: 108 ML/MIN/1.73M2 — SIGNIFICANT CHANGE UP
EGFR: 109 ML/MIN/1.73M2 — SIGNIFICANT CHANGE UP
EGFR: 109 ML/MIN/1.73M2 — SIGNIFICANT CHANGE UP
EGFR: 113 ML/MIN/1.73M2 — SIGNIFICANT CHANGE UP
EGFR: 114 ML/MIN/1.73M2 — SIGNIFICANT CHANGE UP
EGFR: 115 ML/MIN/1.73M2 — SIGNIFICANT CHANGE UP
EGFR: 117 ML/MIN/1.73M2 — SIGNIFICANT CHANGE UP
EGFR: 118 ML/MIN/1.73M2 — SIGNIFICANT CHANGE UP
EGFR: 121 ML/MIN/1.73M2 — SIGNIFICANT CHANGE UP
EGFR: 121 ML/MIN/1.73M2 — SIGNIFICANT CHANGE UP
EOSINOPHIL # BLD AUTO: 0 K/UL — SIGNIFICANT CHANGE UP (ref 0–0.5)
EOSINOPHIL # BLD AUTO: 0.02 K/UL — SIGNIFICANT CHANGE UP (ref 0–0.5)
EOSINOPHIL # BLD AUTO: 0.03 K/UL — SIGNIFICANT CHANGE UP (ref 0–0.5)
EOSINOPHIL # BLD AUTO: 0.04 K/UL — SIGNIFICANT CHANGE UP (ref 0–0.5)
EOSINOPHIL # BLD AUTO: 0.06 K/UL — SIGNIFICANT CHANGE UP (ref 0–0.5)
EOSINOPHIL # BLD AUTO: 0.1 K/UL — SIGNIFICANT CHANGE UP (ref 0–0.5)
EOSINOPHIL # BLD AUTO: 0.1 K/UL — SIGNIFICANT CHANGE UP (ref 0–0.5)
EOSINOPHIL # BLD AUTO: 0.13 K/UL — SIGNIFICANT CHANGE UP (ref 0–0.5)
EOSINOPHIL NFR BLD AUTO: 0 % — SIGNIFICANT CHANGE UP (ref 0–6)
EOSINOPHIL NFR BLD AUTO: 0.2 % — SIGNIFICANT CHANGE UP (ref 0–6)
EOSINOPHIL NFR BLD AUTO: 0.3 % — SIGNIFICANT CHANGE UP (ref 0–6)
EOSINOPHIL NFR BLD AUTO: 0.5 % — SIGNIFICANT CHANGE UP (ref 0–6)
EOSINOPHIL NFR BLD AUTO: 0.9 % — SIGNIFICANT CHANGE UP (ref 0–6)
EOSINOPHIL NFR BLD AUTO: 1 % — SIGNIFICANT CHANGE UP (ref 0–6)
EOSINOPHIL NFR BLD AUTO: 1 % — SIGNIFICANT CHANGE UP (ref 0–6)
EOSINOPHIL NFR BLD AUTO: 12 % — HIGH (ref 0–6)
EPI CELLS # UR: 1 /HPF — SIGNIFICANT CHANGE UP (ref 0–5)
EPI CELLS # UR: 2 /HPF — SIGNIFICANT CHANGE UP (ref 0–5)
FLUID INTAKE SUBSTANCE CLASS: SIGNIFICANT CHANGE UP
GLUCOSE BLDC GLUCOMTR-MCNC: 72 MG/DL — SIGNIFICANT CHANGE UP (ref 70–99)
GLUCOSE BLDC GLUCOMTR-MCNC: 76 MG/DL — SIGNIFICANT CHANGE UP (ref 70–99)
GLUCOSE BLDC GLUCOMTR-MCNC: 86 MG/DL — SIGNIFICANT CHANGE UP (ref 70–99)
GLUCOSE BLDC GLUCOMTR-MCNC: 89 MG/DL — SIGNIFICANT CHANGE UP (ref 70–99)
GLUCOSE BLDC GLUCOMTR-MCNC: 89 MG/DL — SIGNIFICANT CHANGE UP (ref 70–99)
GLUCOSE BLDC GLUCOMTR-MCNC: 90 MG/DL — SIGNIFICANT CHANGE UP (ref 70–99)
GLUCOSE FLD-MCNC: 20 MG/DL — SIGNIFICANT CHANGE UP
GLUCOSE SERPL-MCNC: 101 MG/DL
GLUCOSE SERPL-MCNC: 102 MG/DL — HIGH (ref 70–99)
GLUCOSE SERPL-MCNC: 106 MG/DL — HIGH (ref 70–99)
GLUCOSE SERPL-MCNC: 110 MG/DL — HIGH (ref 70–99)
GLUCOSE SERPL-MCNC: 113 MG/DL — HIGH (ref 70–99)
GLUCOSE SERPL-MCNC: 114 MG/DL
GLUCOSE SERPL-MCNC: 118 MG/DL — HIGH (ref 70–99)
GLUCOSE SERPL-MCNC: 119 MG/DL
GLUCOSE SERPL-MCNC: 122 MG/DL — HIGH (ref 70–99)
GLUCOSE SERPL-MCNC: 132 MG/DL — HIGH (ref 70–99)
GLUCOSE SERPL-MCNC: 69 MG/DL — LOW (ref 70–99)
GLUCOSE SERPL-MCNC: 80 MG/DL — SIGNIFICANT CHANGE UP (ref 70–99)
GLUCOSE SERPL-MCNC: 82 MG/DL — SIGNIFICANT CHANGE UP (ref 70–99)
GLUCOSE SERPL-MCNC: 89 MG/DL — SIGNIFICANT CHANGE UP (ref 70–99)
GLUCOSE SERPL-MCNC: 90 MG/DL — SIGNIFICANT CHANGE UP (ref 70–99)
GLUCOSE SERPL-MCNC: 91 MG/DL — SIGNIFICANT CHANGE UP (ref 70–99)
GLUCOSE SERPL-MCNC: 91 MG/DL — SIGNIFICANT CHANGE UP (ref 70–99)
GLUCOSE SERPL-MCNC: 93 MG/DL — SIGNIFICANT CHANGE UP (ref 70–99)
GLUCOSE SERPL-MCNC: 93 MG/DL — SIGNIFICANT CHANGE UP (ref 70–99)
GLUCOSE SERPL-MCNC: 94 MG/DL — SIGNIFICANT CHANGE UP (ref 70–99)
GLUCOSE SERPL-MCNC: 94 MG/DL — SIGNIFICANT CHANGE UP (ref 70–99)
GLUCOSE SERPL-MCNC: 95 MG/DL — SIGNIFICANT CHANGE UP (ref 70–99)
GLUCOSE SERPL-MCNC: 96 MG/DL — SIGNIFICANT CHANGE UP (ref 70–99)
GLUCOSE SERPL-MCNC: 99 MG/DL — SIGNIFICANT CHANGE UP (ref 70–99)
GLUCOSE UR QL: ABNORMAL
GLUCOSE UR QL: ABNORMAL
GRAM STN FLD: SIGNIFICANT CHANGE UP
HCG SERPL-ACNC: <4 MIU/ML — SIGNIFICANT CHANGE UP
HCT VFR BLD CALC: 24.4 % — LOW (ref 34.5–45)
HCT VFR BLD CALC: 25.1 % — LOW (ref 34.5–45)
HCT VFR BLD CALC: 25.2 % — LOW (ref 34.5–45)
HCT VFR BLD CALC: 28.3 % — LOW (ref 34.5–45)
HCT VFR BLD CALC: 28.5 % — LOW (ref 34.5–45)
HCT VFR BLD CALC: 29.7 % — LOW (ref 34.5–45)
HCT VFR BLD CALC: 31.8 % — LOW (ref 34.5–45)
HCT VFR BLD CALC: 31.9 % — LOW (ref 34.5–45)
HCT VFR BLD CALC: 32.5 % — LOW (ref 34.5–45)
HCT VFR BLD CALC: 33 % — LOW (ref 34.5–45)
HCT VFR BLD CALC: 33.1 % — LOW (ref 34.5–45)
HCT VFR BLD CALC: 34.5 % — SIGNIFICANT CHANGE UP (ref 34.5–45)
HCT VFR BLD CALC: 35.1 % — SIGNIFICANT CHANGE UP (ref 34.5–45)
HCT VFR BLD CALC: 35.8 % — SIGNIFICANT CHANGE UP (ref 34.5–45)
HCT VFR BLD CALC: 36.2 % — SIGNIFICANT CHANGE UP (ref 34.5–45)
HCT VFR BLD CALC: 36.5 % — SIGNIFICANT CHANGE UP (ref 34.5–45)
HCT VFR BLD CALC: 36.8 % — SIGNIFICANT CHANGE UP (ref 34.5–45)
HCT VFR BLD CALC: 36.9 % — SIGNIFICANT CHANGE UP (ref 34.5–45)
HCV AB S/CO SERPL IA: 0.04 S/CO — SIGNIFICANT CHANGE UP (ref 0–0.99)
HCV AB SERPL-IMP: SIGNIFICANT CHANGE UP
HGB BLD-MCNC: 10.1 G/DL — LOW (ref 11.5–15.5)
HGB BLD-MCNC: 11 G/DL — LOW (ref 11.5–15.5)
HGB BLD-MCNC: 11.2 G/DL — LOW (ref 11.5–15.5)
HGB BLD-MCNC: 11.5 G/DL — SIGNIFICANT CHANGE UP (ref 11.5–15.5)
HGB BLD-MCNC: 11.5 G/DL — SIGNIFICANT CHANGE UP (ref 11.5–15.5)
HGB BLD-MCNC: 11.7 G/DL — SIGNIFICANT CHANGE UP (ref 11.5–15.5)
HGB BLD-MCNC: 11.8 G/DL — SIGNIFICANT CHANGE UP (ref 11.5–15.5)
HGB BLD-MCNC: 12.1 G/DL — SIGNIFICANT CHANGE UP (ref 11.5–15.5)
HGB BLD-MCNC: 12.3 G/DL — SIGNIFICANT CHANGE UP (ref 11.5–15.5)
HGB BLD-MCNC: 12.5 G/DL — SIGNIFICANT CHANGE UP (ref 11.5–15.5)
HGB BLD-MCNC: 12.6 G/DL — SIGNIFICANT CHANGE UP (ref 11.5–15.5)
HGB BLD-MCNC: 13.2 G/DL — SIGNIFICANT CHANGE UP (ref 11.5–15.5)
HGB BLD-MCNC: 8.3 G/DL — LOW (ref 11.5–15.5)
HGB BLD-MCNC: 8.3 G/DL — LOW (ref 11.5–15.5)
HGB BLD-MCNC: 8.6 G/DL — LOW (ref 11.5–15.5)
HGB BLD-MCNC: 9 G/DL — LOW (ref 11.5–15.5)
HGB BLD-MCNC: 9.4 G/DL — LOW (ref 11.5–15.5)
HGB BLD-MCNC: 9.4 G/DL — LOW (ref 11.5–15.5)
HYALINE CASTS # UR AUTO: 1 /LPF — SIGNIFICANT CHANGE UP (ref 0–7)
HYALINE CASTS # UR AUTO: 2 /LPF — SIGNIFICANT CHANGE UP (ref 0–7)
HYPOCHROMIA BLD QL: SLIGHT — SIGNIFICANT CHANGE UP
IMM GRANULOCYTES NFR BLD AUTO: 0.7 % — SIGNIFICANT CHANGE UP (ref 0–0.9)
IMM GRANULOCYTES NFR BLD AUTO: 1.3 % — HIGH (ref 0–0.9)
IMM GRANULOCYTES NFR BLD AUTO: 1.5 % — HIGH (ref 0–0.9)
IMM GRANULOCYTES NFR BLD AUTO: 2.2 % — HIGH (ref 0–0.9)
INR BLD: 1.03 RATIO — SIGNIFICANT CHANGE UP (ref 0.88–1.16)
INR BLD: 1.08 RATIO — SIGNIFICANT CHANGE UP (ref 0.88–1.16)
INR BLD: 1.09 RATIO — SIGNIFICANT CHANGE UP (ref 0.88–1.16)
KETONES UR-MCNC: NEGATIVE — SIGNIFICANT CHANGE UP
KETONES UR-MCNC: NEGATIVE — SIGNIFICANT CHANGE UP
LACTATE BLDV-MCNC: 1.3 MMOL/L — SIGNIFICANT CHANGE UP (ref 0.5–2)
LDH SERPL L TO P-CCNC: 358 U/L — SIGNIFICANT CHANGE UP
LEUKOCYTE ESTERASE UR-ACNC: NEGATIVE — SIGNIFICANT CHANGE UP
LEUKOCYTE ESTERASE UR-ACNC: NEGATIVE — SIGNIFICANT CHANGE UP
LYMPHOCYTES # BLD AUTO: 1.4 K/UL — SIGNIFICANT CHANGE UP (ref 1–3.3)
LYMPHOCYTES # BLD AUTO: 19.9 % — SIGNIFICANT CHANGE UP (ref 13–44)
LYMPHOCYTES # BLD AUTO: 2.08 K/UL — SIGNIFICANT CHANGE UP (ref 1–3.3)
LYMPHOCYTES # BLD AUTO: 2.2 K/UL — SIGNIFICANT CHANGE UP (ref 1–3.3)
LYMPHOCYTES # BLD AUTO: 2.3 K/UL — SIGNIFICANT CHANGE UP (ref 1–3.3)
LYMPHOCYTES # BLD AUTO: 2.4 K/UL — SIGNIFICANT CHANGE UP (ref 1–3.3)
LYMPHOCYTES # BLD AUTO: 2.55 K/UL — SIGNIFICANT CHANGE UP (ref 1–3.3)
LYMPHOCYTES # BLD AUTO: 2.8 K/UL — SIGNIFICANT CHANGE UP (ref 1–3.3)
LYMPHOCYTES # BLD AUTO: 2.98 K/UL — SIGNIFICANT CHANGE UP (ref 1–3.3)
LYMPHOCYTES # BLD AUTO: 21 % — SIGNIFICANT CHANGE UP (ref 13–44)
LYMPHOCYTES # BLD AUTO: 21.6 % — SIGNIFICANT CHANGE UP (ref 13–44)
LYMPHOCYTES # BLD AUTO: 23.6 % — SIGNIFICANT CHANGE UP (ref 13–44)
LYMPHOCYTES # BLD AUTO: 25 % — SIGNIFICANT CHANGE UP (ref 13–44)
LYMPHOCYTES # BLD AUTO: 25.6 % — SIGNIFICANT CHANGE UP (ref 13–44)
LYMPHOCYTES # BLD AUTO: 29 % — SIGNIFICANT CHANGE UP (ref 13–44)
LYMPHOCYTES # BLD AUTO: 3 K/UL — SIGNIFICANT CHANGE UP (ref 1–3.3)
LYMPHOCYTES # BLD AUTO: 3.02 K/UL — SIGNIFICANT CHANGE UP (ref 1–3.3)
LYMPHOCYTES # BLD AUTO: 3.3 K/UL — SIGNIFICANT CHANGE UP (ref 1–3.3)
LYMPHOCYTES # BLD AUTO: 3.4 K/UL — HIGH (ref 1–3.3)
LYMPHOCYTES # BLD AUTO: 31 % — SIGNIFICANT CHANGE UP (ref 13–44)
LYMPHOCYTES # BLD AUTO: 33 % — SIGNIFICANT CHANGE UP (ref 13–44)
LYMPHOCYTES # BLD AUTO: 7 % — LOW (ref 13–44)
LYMPHOCYTES # BLD AUTO: 9.6 % — LOW (ref 13–44)
LYMPHOCYTES # BLD AUTO: 9.6 % — LOW (ref 13–44)
LYMPHOCYTES # FLD: 39 % — SIGNIFICANT CHANGE UP
MACROCYTES BLD QL: SLIGHT — SIGNIFICANT CHANGE UP
MAGNESIUM SERPL-MCNC: 1.6 MG/DL — LOW (ref 1.8–2.6)
MAGNESIUM SERPL-MCNC: 1.7 MG/DL — SIGNIFICANT CHANGE UP (ref 1.6–2.6)
MAGNESIUM SERPL-MCNC: 1.8 MG/DL — SIGNIFICANT CHANGE UP (ref 1.6–2.6)
MAGNESIUM SERPL-MCNC: 1.9 MG/DL — SIGNIFICANT CHANGE UP (ref 1.8–2.6)
MAGNESIUM SERPL-MCNC: 2 MG/DL
MAGNESIUM SERPL-MCNC: 2 MG/DL — SIGNIFICANT CHANGE UP (ref 1.6–2.6)
MAGNESIUM SERPL-MCNC: 2.1 MG/DL
MAGNESIUM SERPL-MCNC: 2.1 MG/DL — SIGNIFICANT CHANGE UP (ref 1.6–2.6)
MAGNESIUM SERPL-MCNC: 2.2 MG/DL — SIGNIFICANT CHANGE UP (ref 1.6–2.6)
MANUAL SMEAR VERIFICATION: SIGNIFICANT CHANGE UP
MCHC RBC-ENTMCNC: 29.2 PG — SIGNIFICANT CHANGE UP (ref 27–34)
MCHC RBC-ENTMCNC: 29.4 PG — SIGNIFICANT CHANGE UP (ref 27–34)
MCHC RBC-ENTMCNC: 31.6 GM/DL — LOW (ref 32–36)
MCHC RBC-ENTMCNC: 31.6 GM/DL — LOW (ref 32–36)
MCHC RBC-ENTMCNC: 31.7 GM/DL — LOW (ref 32–36)
MCHC RBC-ENTMCNC: 31.8 GM/DL — LOW (ref 32–36)
MCHC RBC-ENTMCNC: 32.7 GM/DL — SIGNIFICANT CHANGE UP (ref 32–36)
MCHC RBC-ENTMCNC: 32.9 PG — SIGNIFICANT CHANGE UP (ref 27–34)
MCHC RBC-ENTMCNC: 32.9 PG — SIGNIFICANT CHANGE UP (ref 27–34)
MCHC RBC-ENTMCNC: 33.1 GM/DL — SIGNIFICANT CHANGE UP (ref 32–36)
MCHC RBC-ENTMCNC: 33.2 GM/DL — SIGNIFICANT CHANGE UP (ref 32–36)
MCHC RBC-ENTMCNC: 33.3 GM/DL — SIGNIFICANT CHANGE UP (ref 32–36)
MCHC RBC-ENTMCNC: 33.3 PG — SIGNIFICANT CHANGE UP (ref 27–34)
MCHC RBC-ENTMCNC: 33.4 PG — SIGNIFICANT CHANGE UP (ref 27–34)
MCHC RBC-ENTMCNC: 33.7 PG — SIGNIFICANT CHANGE UP (ref 27–34)
MCHC RBC-ENTMCNC: 33.8 GM/DL — SIGNIFICANT CHANGE UP (ref 32–36)
MCHC RBC-ENTMCNC: 33.8 PG — SIGNIFICANT CHANGE UP (ref 27–34)
MCHC RBC-ENTMCNC: 33.9 GM/DL — SIGNIFICANT CHANGE UP (ref 32–36)
MCHC RBC-ENTMCNC: 33.9 PG — SIGNIFICANT CHANGE UP (ref 27–34)
MCHC RBC-ENTMCNC: 34 GM/DL — SIGNIFICANT CHANGE UP (ref 32–36)
MCHC RBC-ENTMCNC: 34 PG — SIGNIFICANT CHANGE UP (ref 27–34)
MCHC RBC-ENTMCNC: 34.1 GM/DL — SIGNIFICANT CHANGE UP (ref 32–36)
MCHC RBC-ENTMCNC: 34.1 PG — HIGH (ref 27–34)
MCHC RBC-ENTMCNC: 34.1 PG — HIGH (ref 27–34)
MCHC RBC-ENTMCNC: 34.2 PG — HIGH (ref 27–34)
MCHC RBC-ENTMCNC: 34.2 PG — HIGH (ref 27–34)
MCHC RBC-ENTMCNC: 34.6 PG — HIGH (ref 27–34)
MCHC RBC-ENTMCNC: 34.7 G/DL — SIGNIFICANT CHANGE UP (ref 32–36)
MCHC RBC-ENTMCNC: 35.1 G/DL — SIGNIFICANT CHANGE UP (ref 32–36)
MCHC RBC-ENTMCNC: 35.6 G/DL — SIGNIFICANT CHANGE UP (ref 32–36)
MCHC RBC-ENTMCNC: 35.8 G/DL — SIGNIFICANT CHANGE UP (ref 32–36)
MCHC RBC-ENTMCNC: 35.9 G/DL — SIGNIFICANT CHANGE UP (ref 32–36)
MCHC RBC-ENTMCNC: 36.2 G/DL — HIGH (ref 32–36)
MCV RBC AUTO: 100 FL — SIGNIFICANT CHANGE UP (ref 80–100)
MCV RBC AUTO: 101.7 FL — HIGH (ref 80–100)
MCV RBC AUTO: 101.7 FL — HIGH (ref 80–100)
MCV RBC AUTO: 102.5 FL — HIGH (ref 80–100)
MCV RBC AUTO: 103.2 FL — HIGH (ref 80–100)
MCV RBC AUTO: 103.3 FL — HIGH (ref 80–100)
MCV RBC AUTO: 105.6 FL — HIGH (ref 80–100)
MCV RBC AUTO: 106.5 FL — HIGH (ref 80–100)
MCV RBC AUTO: 92.5 FL — SIGNIFICANT CHANGE UP (ref 80–100)
MCV RBC AUTO: 92.8 FL — SIGNIFICANT CHANGE UP (ref 80–100)
MCV RBC AUTO: 94.2 FL — SIGNIFICANT CHANGE UP (ref 80–100)
MCV RBC AUTO: 94.4 FL — SIGNIFICANT CHANGE UP (ref 80–100)
MCV RBC AUTO: 94.8 FL — SIGNIFICANT CHANGE UP (ref 80–100)
MCV RBC AUTO: 94.9 FL — SIGNIFICANT CHANGE UP (ref 80–100)
MCV RBC AUTO: 94.9 FL — SIGNIFICANT CHANGE UP (ref 80–100)
MCV RBC AUTO: 95.6 FL — SIGNIFICANT CHANGE UP (ref 80–100)
MCV RBC AUTO: 97.1 FL — SIGNIFICANT CHANGE UP (ref 80–100)
MCV RBC AUTO: 99.6 FL — SIGNIFICANT CHANGE UP (ref 80–100)
MESOTHL CELL # FLD: 1 % — SIGNIFICANT CHANGE UP
METAMYELOCYTES # FLD: 1 % — HIGH (ref 0–0)
MICROCYTES BLD QL: SLIGHT — SIGNIFICANT CHANGE UP
MONOCYTES # BLD AUTO: 0.26 K/UL — SIGNIFICANT CHANGE UP (ref 0–0.9)
MONOCYTES # BLD AUTO: 0.4 K/UL — SIGNIFICANT CHANGE UP (ref 0–0.9)
MONOCYTES # BLD AUTO: 0.5 K/UL — SIGNIFICANT CHANGE UP (ref 0–0.9)
MONOCYTES # BLD AUTO: 0.6 K/UL — SIGNIFICANT CHANGE UP (ref 0–0.9)
MONOCYTES # BLD AUTO: 0.7 K/UL — SIGNIFICANT CHANGE UP (ref 0–0.9)
MONOCYTES # BLD AUTO: 0.71 K/UL — SIGNIFICANT CHANGE UP (ref 0–0.9)
MONOCYTES # BLD AUTO: 1 K/UL — HIGH (ref 0–0.9)
MONOCYTES # BLD AUTO: 2.17 K/UL — HIGH (ref 0–0.9)
MONOCYTES NFR BLD AUTO: 1.8 % — LOW (ref 2–14)
MONOCYTES NFR BLD AUTO: 10 % — SIGNIFICANT CHANGE UP (ref 2–14)
MONOCYTES NFR BLD AUTO: 12 % — SIGNIFICANT CHANGE UP (ref 2–14)
MONOCYTES NFR BLD AUTO: 12.3 % — SIGNIFICANT CHANGE UP (ref 2–14)
MONOCYTES NFR BLD AUTO: 2 % — SIGNIFICANT CHANGE UP (ref 2–14)
MONOCYTES NFR BLD AUTO: 3 % — SIGNIFICANT CHANGE UP (ref 2–14)
MONOCYTES NFR BLD AUTO: 3.1 % — SIGNIFICANT CHANGE UP (ref 2–14)
MONOCYTES NFR BLD AUTO: 4.6 % — SIGNIFICANT CHANGE UP (ref 2–14)
MONOCYTES NFR BLD AUTO: 4.7 % — SIGNIFICANT CHANGE UP (ref 2–14)
MONOCYTES NFR BLD AUTO: 6 % — SIGNIFICANT CHANGE UP (ref 2–14)
MONOCYTES NFR BLD AUTO: 6.9 % — SIGNIFICANT CHANGE UP (ref 2–14)
MONOCYTES NFR BLD AUTO: 8 % — SIGNIFICANT CHANGE UP (ref 2–14)
MONOS+MACROS # FLD: 35 % — SIGNIFICANT CHANGE UP
MYELOCYTES NFR BLD: 1 % — HIGH (ref 0–0)
NEUTROPHILS # BLD AUTO: 10.97 K/UL — HIGH (ref 1.8–7.4)
NEUTROPHILS # BLD AUTO: 12.62 K/UL — HIGH (ref 1.8–7.4)
NEUTROPHILS # BLD AUTO: 26.3 K/UL — HIGH (ref 1.8–7.4)
NEUTROPHILS # BLD AUTO: 38.2 K/UL — HIGH (ref 1.8–7.4)
NEUTROPHILS # BLD AUTO: 4.1 K/UL — SIGNIFICANT CHANGE UP (ref 1.8–7.4)
NEUTROPHILS # BLD AUTO: 4.9 K/UL — SIGNIFICANT CHANGE UP (ref 1.8–7.4)
NEUTROPHILS # BLD AUTO: 4.93 K/UL — SIGNIFICANT CHANGE UP (ref 1.8–7.4)
NEUTROPHILS # BLD AUTO: 5.6 K/UL — SIGNIFICANT CHANGE UP (ref 1.8–7.4)
NEUTROPHILS # BLD AUTO: 6.2 K/UL — SIGNIFICANT CHANGE UP (ref 1.8–7.4)
NEUTROPHILS # BLD AUTO: 7.1 K/UL — SIGNIFICANT CHANGE UP (ref 1.8–7.4)
NEUTROPHILS # BLD AUTO: 7.54 K/UL — HIGH (ref 1.8–7.4)
NEUTROPHILS # BLD AUTO: 9.36 K/UL — HIGH (ref 1.8–7.4)
NEUTROPHILS NFR BLD AUTO: 53 % — SIGNIFICANT CHANGE UP (ref 43–77)
NEUTROPHILS NFR BLD AUTO: 55 % — SIGNIFICANT CHANGE UP (ref 43–77)
NEUTROPHILS NFR BLD AUTO: 57 % — SIGNIFICANT CHANGE UP (ref 43–77)
NEUTROPHILS NFR BLD AUTO: 59 % — SIGNIFICANT CHANGE UP (ref 43–77)
NEUTROPHILS NFR BLD AUTO: 60 % — SIGNIFICANT CHANGE UP (ref 43–77)
NEUTROPHILS NFR BLD AUTO: 61 % — SIGNIFICANT CHANGE UP (ref 43–77)
NEUTROPHILS NFR BLD AUTO: 69.9 % — SIGNIFICANT CHANGE UP (ref 43–77)
NEUTROPHILS NFR BLD AUTO: 72.1 % — SIGNIFICANT CHANGE UP (ref 43–77)
NEUTROPHILS NFR BLD AUTO: 73 % — SIGNIFICANT CHANGE UP (ref 43–77)
NEUTROPHILS NFR BLD AUTO: 79.8 % — HIGH (ref 43–77)
NEUTROPHILS NFR BLD AUTO: 82.6 % — HIGH (ref 43–77)
NEUTROPHILS NFR BLD AUTO: 90 % — HIGH (ref 43–77)
NEUTROPHILS-BODY FLUID: 25 % — SIGNIFICANT CHANGE UP
NEUTS BAND # BLD: 0.9 % — SIGNIFICANT CHANGE UP (ref 0–8)
NEUTS BAND # BLD: 1 % — SIGNIFICANT CHANGE UP (ref 0–8)
NITRITE UR-MCNC: NEGATIVE — SIGNIFICANT CHANGE UP
NITRITE UR-MCNC: NEGATIVE — SIGNIFICANT CHANGE UP
NON-GYNECOLOGICAL CYTOLOGY STUDY: SIGNIFICANT CHANGE UP
OVALOCYTES BLD QL SMEAR: SLIGHT — SIGNIFICANT CHANGE UP
PH UR: 6 — SIGNIFICANT CHANGE UP (ref 5–8)
PH UR: 6.5 — SIGNIFICANT CHANGE UP (ref 5–8)
PHOSPHATE SERPL-MCNC: 2.1 MG/DL — LOW (ref 2.4–4.7)
PHOSPHATE SERPL-MCNC: 2.4 MG/DL — SIGNIFICANT CHANGE UP (ref 2.4–4.7)
PHOSPHATE SERPL-MCNC: 2.5 MG/DL — SIGNIFICANT CHANGE UP (ref 2.4–4.7)
PHOSPHATE SERPL-MCNC: 3 MG/DL — SIGNIFICANT CHANGE UP (ref 2.4–4.7)
PHOSPHATE SERPL-MCNC: 3.2 MG/DL — SIGNIFICANT CHANGE UP (ref 2.4–4.7)
PHOSPHATE SERPL-MCNC: 3.6 MG/DL — SIGNIFICANT CHANGE UP (ref 2.4–4.7)
PHOSPHATE SERPL-MCNC: 3.8 MG/DL — SIGNIFICANT CHANGE UP (ref 2.4–4.7)
PLAT MORPH BLD: NORMAL — SIGNIFICANT CHANGE UP
PLATELET # BLD AUTO: 100 K/UL — LOW (ref 150–400)
PLATELET # BLD AUTO: 115 K/UL — LOW (ref 150–400)
PLATELET # BLD AUTO: 175 K/UL — SIGNIFICANT CHANGE UP (ref 150–400)
PLATELET # BLD AUTO: 319 K/UL — SIGNIFICANT CHANGE UP (ref 150–400)
PLATELET # BLD AUTO: 52 K/UL — LOW (ref 150–400)
PLATELET # BLD AUTO: 55 K/UL — LOW (ref 150–400)
PLATELET # BLD AUTO: 57 K/UL — LOW (ref 150–400)
PLATELET # BLD AUTO: 61 K/UL — LOW (ref 150–400)
PLATELET # BLD AUTO: 63 K/UL — LOW (ref 150–400)
PLATELET # BLD AUTO: 63 K/UL — LOW (ref 150–400)
PLATELET # BLD AUTO: 64 K/UL — LOW (ref 150–400)
PLATELET # BLD AUTO: 71 K/UL — LOW (ref 150–400)
PLATELET # BLD AUTO: 75 K/UL — LOW (ref 150–400)
PLATELET # BLD AUTO: 76 K/UL — LOW (ref 150–400)
PLATELET # BLD AUTO: 77 K/UL — LOW (ref 150–400)
PLATELET # BLD AUTO: 80 K/UL — LOW (ref 150–400)
PLATELET # BLD AUTO: 82 K/UL — LOW (ref 150–400)
PLATELET # BLD AUTO: 91 K/UL — LOW (ref 150–400)
POIKILOCYTOSIS BLD QL AUTO: SLIGHT — SIGNIFICANT CHANGE UP
POLYCHROMASIA BLD QL SMEAR: SLIGHT — SIGNIFICANT CHANGE UP
POTASSIUM SERPL-MCNC: 2 MMOL/L — CRITICAL LOW (ref 3.5–5.3)
POTASSIUM SERPL-MCNC: 2.5 MMOL/L — CRITICAL LOW (ref 3.5–5.3)
POTASSIUM SERPL-MCNC: 2.8 MMOL/L — CRITICAL LOW (ref 3.5–5.3)
POTASSIUM SERPL-MCNC: 2.9 MMOL/L — CRITICAL LOW (ref 3.5–5.3)
POTASSIUM SERPL-MCNC: 2.9 MMOL/L — CRITICAL LOW (ref 3.5–5.3)
POTASSIUM SERPL-MCNC: 3 MMOL/L — LOW (ref 3.5–5.3)
POTASSIUM SERPL-MCNC: 3.1 MMOL/L — LOW (ref 3.5–5.3)
POTASSIUM SERPL-MCNC: 3.2 MMOL/L — LOW (ref 3.5–5.3)
POTASSIUM SERPL-MCNC: 3.5 MMOL/L — SIGNIFICANT CHANGE UP (ref 3.5–5.3)
POTASSIUM SERPL-MCNC: 3.5 MMOL/L — SIGNIFICANT CHANGE UP (ref 3.5–5.3)
POTASSIUM SERPL-MCNC: 3.6 MMOL/L — SIGNIFICANT CHANGE UP (ref 3.5–5.3)
POTASSIUM SERPL-MCNC: 3.7 MMOL/L — SIGNIFICANT CHANGE UP (ref 3.5–5.3)
POTASSIUM SERPL-MCNC: 3.8 MMOL/L — SIGNIFICANT CHANGE UP (ref 3.5–5.3)
POTASSIUM SERPL-MCNC: 4.2 MMOL/L — SIGNIFICANT CHANGE UP (ref 3.5–5.3)
POTASSIUM SERPL-MCNC: 4.5 MMOL/L — SIGNIFICANT CHANGE UP (ref 3.5–5.3)
POTASSIUM SERPL-MCNC: 4.5 MMOL/L — SIGNIFICANT CHANGE UP (ref 3.5–5.3)
POTASSIUM SERPL-MCNC: 4.6 MMOL/L — SIGNIFICANT CHANGE UP (ref 3.5–5.3)
POTASSIUM SERPL-MCNC: 4.6 MMOL/L — SIGNIFICANT CHANGE UP (ref 3.5–5.3)
POTASSIUM SERPL-MCNC: 4.7 MMOL/L — SIGNIFICANT CHANGE UP (ref 3.5–5.3)
POTASSIUM SERPL-SCNC: 2 MMOL/L — CRITICAL LOW (ref 3.5–5.3)
POTASSIUM SERPL-SCNC: 2.5 MMOL/L — CRITICAL LOW (ref 3.5–5.3)
POTASSIUM SERPL-SCNC: 2.7 MMOL/L
POTASSIUM SERPL-SCNC: 2.7 MMOL/L
POTASSIUM SERPL-SCNC: 2.8 MMOL/L — CRITICAL LOW (ref 3.5–5.3)
POTASSIUM SERPL-SCNC: 2.9 MMOL/L — CRITICAL LOW (ref 3.5–5.3)
POTASSIUM SERPL-SCNC: 2.9 MMOL/L — CRITICAL LOW (ref 3.5–5.3)
POTASSIUM SERPL-SCNC: 3 MMOL/L — LOW (ref 3.5–5.3)
POTASSIUM SERPL-SCNC: 3.1 MMOL/L
POTASSIUM SERPL-SCNC: 3.1 MMOL/L — LOW (ref 3.5–5.3)
POTASSIUM SERPL-SCNC: 3.2 MMOL/L — LOW (ref 3.5–5.3)
POTASSIUM SERPL-SCNC: 3.5 MMOL/L — SIGNIFICANT CHANGE UP (ref 3.5–5.3)
POTASSIUM SERPL-SCNC: 3.5 MMOL/L — SIGNIFICANT CHANGE UP (ref 3.5–5.3)
POTASSIUM SERPL-SCNC: 3.6 MMOL/L — SIGNIFICANT CHANGE UP (ref 3.5–5.3)
POTASSIUM SERPL-SCNC: 3.7 MMOL/L — SIGNIFICANT CHANGE UP (ref 3.5–5.3)
POTASSIUM SERPL-SCNC: 3.8 MMOL/L — SIGNIFICANT CHANGE UP (ref 3.5–5.3)
POTASSIUM SERPL-SCNC: 4.2 MMOL/L — SIGNIFICANT CHANGE UP (ref 3.5–5.3)
POTASSIUM SERPL-SCNC: 4.5 MMOL/L — SIGNIFICANT CHANGE UP (ref 3.5–5.3)
POTASSIUM SERPL-SCNC: 4.5 MMOL/L — SIGNIFICANT CHANGE UP (ref 3.5–5.3)
POTASSIUM SERPL-SCNC: 4.6 MMOL/L — SIGNIFICANT CHANGE UP (ref 3.5–5.3)
POTASSIUM SERPL-SCNC: 4.6 MMOL/L — SIGNIFICANT CHANGE UP (ref 3.5–5.3)
POTASSIUM SERPL-SCNC: 4.7 MMOL/L — SIGNIFICANT CHANGE UP (ref 3.5–5.3)
PROCALCITONIN SERPL-MCNC: 0.45 NG/ML — HIGH (ref 0.02–0.1)
PROT FLD-MCNC: 4.2 G/DL — SIGNIFICANT CHANGE UP
PROT SERPL-MCNC: 4.9 G/DL — LOW (ref 6.6–8.7)
PROT SERPL-MCNC: 5.4 G/DL — LOW (ref 6.6–8.7)
PROT SERPL-MCNC: 5.4 G/DL — LOW (ref 6.6–8.7)
PROT SERPL-MCNC: 5.7 G/DL — LOW (ref 6.6–8.7)
PROT SERPL-MCNC: 5.7 G/DL — LOW (ref 6–8.3)
PROT SERPL-MCNC: 6 G/DL — SIGNIFICANT CHANGE UP (ref 6–8.3)
PROT SERPL-MCNC: 6.2 G/DL
PROT SERPL-MCNC: 6.2 G/DL
PROT SERPL-MCNC: 6.2 G/DL — SIGNIFICANT CHANGE UP (ref 6–8.3)
PROT SERPL-MCNC: 6.3 G/DL
PROT SERPL-MCNC: 6.4 G/DL — SIGNIFICANT CHANGE UP (ref 6–8.3)
PROT SERPL-MCNC: 6.5 G/DL — LOW (ref 6.6–8.7)
PROT SERPL-MCNC: 6.6 G/DL — SIGNIFICANT CHANGE UP (ref 6–8.3)
PROT SERPL-MCNC: 6.7 G/DL — SIGNIFICANT CHANGE UP (ref 6.6–8.7)
PROT UR-MCNC: NEGATIVE — SIGNIFICANT CHANGE UP
PROT UR-MCNC: SIGNIFICANT CHANGE UP
PROTHROM AB SERPL-ACNC: 12 SEC — SIGNIFICANT CHANGE UP (ref 10.5–13.4)
PROTHROM AB SERPL-ACNC: 12.5 SEC — SIGNIFICANT CHANGE UP (ref 10.5–13.4)
PROTHROM AB SERPL-ACNC: 12.6 SEC — SIGNIFICANT CHANGE UP (ref 10.5–13.4)
RAPID RVP RESULT: SIGNIFICANT CHANGE UP
RBC # BLD: 2.4 M/UL — LOW (ref 3.8–5.2)
RBC # BLD: 2.43 M/UL — LOW (ref 3.8–5.2)
RBC # BLD: 2.53 M/UL — LOW (ref 3.8–5.2)
RBC # BLD: 2.76 M/UL — LOW (ref 3.8–5.2)
RBC # BLD: 3.02 M/UL — LOW (ref 3.8–5.2)
RBC # BLD: 3.08 M/UL — LOW (ref 3.8–5.2)
RBC # BLD: 3.2 M/UL — LOW (ref 3.8–5.2)
RBC # BLD: 3.25 M/UL — LOW (ref 3.8–5.2)
RBC # BLD: 3.37 M/UL — LOW (ref 3.8–5.2)
RBC # BLD: 3.4 M/UL — LOW (ref 3.8–5.2)
RBC # BLD: 3.4 M/UL — LOW (ref 3.8–5.2)
RBC # BLD: 3.46 M/UL — LOW (ref 3.8–5.2)
RBC # BLD: 3.47 M/UL — LOW (ref 3.8–5.2)
RBC # BLD: 3.63 M/UL — LOW (ref 3.8–5.2)
RBC # BLD: 3.64 M/UL — LOW (ref 3.8–5.2)
RBC # BLD: 3.7 M/UL — LOW (ref 3.8–5.2)
RBC # BLD: 3.85 M/UL — SIGNIFICANT CHANGE UP (ref 3.8–5.2)
RBC # BLD: 3.9 M/UL — SIGNIFICANT CHANGE UP (ref 3.8–5.2)
RBC # FLD: 14.5 % — SIGNIFICANT CHANGE UP (ref 10.3–14.5)
RBC # FLD: 14.9 % — HIGH (ref 10.3–14.5)
RBC # FLD: 14.9 % — HIGH (ref 10.3–14.5)
RBC # FLD: 15.2 % — HIGH (ref 10.3–14.5)
RBC # FLD: 15.2 % — HIGH (ref 10.3–14.5)
RBC # FLD: 15.3 % — HIGH (ref 10.3–14.5)
RBC # FLD: 15.3 % — HIGH (ref 10.3–14.5)
RBC # FLD: 15.9 % — HIGH (ref 10.3–14.5)
RBC # FLD: 16.2 % — HIGH (ref 10.3–14.5)
RBC # FLD: 16.6 % — HIGH (ref 10.3–14.5)
RBC # FLD: 16.9 % — HIGH (ref 10.3–14.5)
RBC # FLD: 17 % — HIGH (ref 10.3–14.5)
RBC # FLD: 17.2 % — HIGH (ref 10.3–14.5)
RBC # FLD: 17.5 % — HIGH (ref 10.3–14.5)
RBC # FLD: 17.5 % — HIGH (ref 10.3–14.5)
RBC # FLD: 17.7 % — HIGH (ref 10.3–14.5)
RBC BLD AUTO: NORMAL — SIGNIFICANT CHANGE UP
RBC BLD AUTO: SIGNIFICANT CHANGE UP
RBC CASTS # UR COMP ASSIST: 1 /HPF — SIGNIFICANT CHANGE UP (ref 0–4)
RBC CASTS # UR COMP ASSIST: 2 /HPF — SIGNIFICANT CHANGE UP (ref 0–4)
RCV VOL RI: < 2000 /UL — SIGNIFICANT CHANGE UP (ref 0–0)
SARS-COV-2 RNA SPEC QL NAA+PROBE: SIGNIFICANT CHANGE UP
SARS-COV-2 RNA SPEC QL NAA+PROBE: SIGNIFICANT CHANGE UP
SMUDGE CELLS # BLD: PRESENT — SIGNIFICANT CHANGE UP
SODIUM SERPL-SCNC: 125 MMOL/L — LOW (ref 135–145)
SODIUM SERPL-SCNC: 129 MMOL/L — LOW (ref 135–145)
SODIUM SERPL-SCNC: 129 MMOL/L — LOW (ref 135–145)
SODIUM SERPL-SCNC: 133 MMOL/L — LOW (ref 135–145)
SODIUM SERPL-SCNC: 133 MMOL/L — LOW (ref 135–145)
SODIUM SERPL-SCNC: 134 MMOL/L — LOW (ref 135–145)
SODIUM SERPL-SCNC: 135 MMOL/L — SIGNIFICANT CHANGE UP (ref 135–145)
SODIUM SERPL-SCNC: 136 MMOL/L
SODIUM SERPL-SCNC: 136 MMOL/L
SODIUM SERPL-SCNC: 136 MMOL/L — SIGNIFICANT CHANGE UP (ref 135–145)
SODIUM SERPL-SCNC: 137 MMOL/L — SIGNIFICANT CHANGE UP (ref 135–145)
SODIUM SERPL-SCNC: 138 MMOL/L — SIGNIFICANT CHANGE UP (ref 135–145)
SODIUM SERPL-SCNC: 139 MMOL/L — SIGNIFICANT CHANGE UP (ref 135–145)
SODIUM SERPL-SCNC: 139 MMOL/L — SIGNIFICANT CHANGE UP (ref 135–145)
SODIUM SERPL-SCNC: 140 MMOL/L
SP GR SPEC: 1.01 — SIGNIFICANT CHANGE UP (ref 1.01–1.02)
SP GR SPEC: 1.02 — SIGNIFICANT CHANGE UP (ref 1.01–1.02)
SPECIMEN SOURCE: SIGNIFICANT CHANGE UP
STOMATOCYTES BLD QL SMEAR: PRESENT — SIGNIFICANT CHANGE UP
TOTAL NUCLEATED CELL COUNT, BODY FLUID: 353 /UL — SIGNIFICANT CHANGE UP
TOXIC GRANULES BLD QL SMEAR: PRESENT — SIGNIFICANT CHANGE UP
TUBE TYPE: SIGNIFICANT CHANGE UP
UROBILINOGEN FLD QL: SIGNIFICANT CHANGE UP
UROBILINOGEN FLD QL: SIGNIFICANT CHANGE UP
VARIANT LYMPHS # BLD: 3 % — SIGNIFICANT CHANGE UP (ref 0–6)
VARIANT LYMPHS # BLD: 5 % — SIGNIFICANT CHANGE UP (ref 0–6)
VARIANT LYMPHS # BLD: 8 % — HIGH (ref 0–6)
WBC # BLD: 10.79 K/UL — HIGH (ref 3.8–10.5)
WBC # BLD: 11.2 K/UL — HIGH (ref 3.8–10.5)
WBC # BLD: 11.53 K/UL — HIGH (ref 3.8–10.5)
WBC # BLD: 12.04 K/UL — HIGH (ref 3.8–10.5)
WBC # BLD: 12.65 K/UL — HIGH (ref 3.8–10.5)
WBC # BLD: 12.98 K/UL — HIGH (ref 3.8–10.5)
WBC # BLD: 14.54 K/UL — HIGH (ref 3.8–10.5)
WBC # BLD: 15.01 K/UL — HIGH (ref 3.8–10.5)
WBC # BLD: 15.33 K/UL — HIGH (ref 3.8–10.5)
WBC # BLD: 15.52 K/UL — HIGH (ref 3.8–10.5)
WBC # BLD: 31.5 K/UL — HIGH (ref 3.8–10.5)
WBC # BLD: 6.49 K/UL — SIGNIFICANT CHANGE UP (ref 3.8–10.5)
WBC # BLD: 6.8 K/UL — SIGNIFICANT CHANGE UP (ref 3.8–10.5)
WBC # BLD: 62 K/UL — SIGNIFICANT CHANGE UP (ref 3.8–10.5)
WBC # BLD: 8.11 K/UL — SIGNIFICANT CHANGE UP (ref 3.8–10.5)
WBC # BLD: 8.6 K/UL — SIGNIFICANT CHANGE UP (ref 3.8–10.5)
WBC # BLD: 8.8 K/UL — SIGNIFICANT CHANGE UP (ref 3.8–10.5)
WBC # BLD: 9.6 K/UL — SIGNIFICANT CHANGE UP (ref 3.8–10.5)
WBC # FLD AUTO: 10.79 K/UL — HIGH (ref 3.8–10.5)
WBC # FLD AUTO: 11.2 K/UL — HIGH (ref 3.8–10.5)
WBC # FLD AUTO: 11.53 K/UL — HIGH (ref 3.8–10.5)
WBC # FLD AUTO: 12.04 K/UL — HIGH (ref 3.8–10.5)
WBC # FLD AUTO: 12.65 K/UL — HIGH (ref 3.8–10.5)
WBC # FLD AUTO: 12.98 K/UL — HIGH (ref 3.8–10.5)
WBC # FLD AUTO: 14.54 K/UL — HIGH (ref 3.8–10.5)
WBC # FLD AUTO: 15.01 K/UL — HIGH (ref 3.8–10.5)
WBC # FLD AUTO: 15.33 K/UL — HIGH (ref 3.8–10.5)
WBC # FLD AUTO: 15.52 K/UL — HIGH (ref 3.8–10.5)
WBC # FLD AUTO: 31.5 K/UL — HIGH (ref 3.8–10.5)
WBC # FLD AUTO: 6.49 K/UL — SIGNIFICANT CHANGE UP (ref 3.8–10.5)
WBC # FLD AUTO: 6.8 K/UL — SIGNIFICANT CHANGE UP (ref 3.8–10.5)
WBC # FLD AUTO: 62 K/UL — SIGNIFICANT CHANGE UP (ref 3.8–10.5)
WBC # FLD AUTO: 8.11 K/UL — SIGNIFICANT CHANGE UP (ref 3.8–10.5)
WBC # FLD AUTO: 8.6 K/UL — SIGNIFICANT CHANGE UP (ref 3.8–10.5)
WBC # FLD AUTO: 8.8 K/UL — SIGNIFICANT CHANGE UP (ref 3.8–10.5)
WBC # FLD AUTO: 9.6 K/UL — SIGNIFICANT CHANGE UP (ref 3.8–10.5)
WBC UR QL: 3 /HPF — SIGNIFICANT CHANGE UP (ref 0–5)
WBC UR QL: 3 /HPF — SIGNIFICANT CHANGE UP (ref 0–5)

## 2023-01-01 PROCEDURE — 71260 CT THORAX DX C+: CPT | Mod: MG

## 2023-01-01 PROCEDURE — 89051 BODY FLUID CELL COUNT: CPT

## 2023-01-01 PROCEDURE — C1729: CPT

## 2023-01-01 PROCEDURE — 36415 COLL VENOUS BLD VENIPUNCTURE: CPT

## 2023-01-01 PROCEDURE — 85610 PROTHROMBIN TIME: CPT

## 2023-01-01 PROCEDURE — 88305 TISSUE EXAM BY PATHOLOGIST: CPT

## 2023-01-01 PROCEDURE — 82962 GLUCOSE BLOOD TEST: CPT

## 2023-01-01 PROCEDURE — 70450 CT HEAD/BRAIN W/O DYE: CPT | Mod: 26,MA

## 2023-01-01 PROCEDURE — 80053 COMPREHEN METABOLIC PANEL: CPT

## 2023-01-01 PROCEDURE — C9399: CPT

## 2023-01-01 PROCEDURE — 84145 PROCALCITONIN (PCT): CPT

## 2023-01-01 PROCEDURE — 99232 SBSQ HOSP IP/OBS MODERATE 35: CPT

## 2023-01-01 PROCEDURE — 99239 HOSP IP/OBS DSCHRG MGMT >30: CPT

## 2023-01-01 PROCEDURE — 99221 1ST HOSP IP/OBS SF/LOW 40: CPT

## 2023-01-01 PROCEDURE — 84702 CHORIONIC GONADOTROPIN TEST: CPT

## 2023-01-01 PROCEDURE — 82945 GLUCOSE OTHER FLUID: CPT

## 2023-01-01 PROCEDURE — 86923 COMPATIBILITY TEST ELECTRIC: CPT

## 2023-01-01 PROCEDURE — 87205 SMEAR GRAM STAIN: CPT

## 2023-01-01 PROCEDURE — 85730 THROMBOPLASTIN TIME PARTIAL: CPT

## 2023-01-01 PROCEDURE — 99024 POSTOP FOLLOW-UP VISIT: CPT

## 2023-01-01 PROCEDURE — 49083 ABD PARACENTESIS W/IMAGING: CPT

## 2023-01-01 PROCEDURE — 99223 1ST HOSP IP/OBS HIGH 75: CPT

## 2023-01-01 PROCEDURE — 99222 1ST HOSP IP/OBS MODERATE 55: CPT

## 2023-01-01 PROCEDURE — 82042 OTHER SOURCE ALBUMIN QUAN EA: CPT

## 2023-01-01 PROCEDURE — U0003: CPT

## 2023-01-01 PROCEDURE — 83615 LACTATE (LD) (LDH) ENZYME: CPT

## 2023-01-01 PROCEDURE — 99285 EMERGENCY DEPT VISIT HI MDM: CPT | Mod: 25

## 2023-01-01 PROCEDURE — 71260 CT THORAX DX C+: CPT | Mod: 26

## 2023-01-01 PROCEDURE — 71045 X-RAY EXAM CHEST 1 VIEW: CPT | Mod: 26

## 2023-01-01 PROCEDURE — 71045 X-RAY EXAM CHEST 1 VIEW: CPT

## 2023-01-01 PROCEDURE — 93010 ELECTROCARDIOGRAM REPORT: CPT

## 2023-01-01 PROCEDURE — 99497 ADVNCD CARE PLAN 30 MIN: CPT | Mod: 25

## 2023-01-01 PROCEDURE — 87070 CULTURE OTHR SPECIMN AEROBIC: CPT

## 2023-01-01 PROCEDURE — 96375 TX/PRO/DX INJ NEW DRUG ADDON: CPT

## 2023-01-01 PROCEDURE — 71260 CT THORAX DX C+: CPT

## 2023-01-01 PROCEDURE — 80048 BASIC METABOLIC PNL TOTAL CA: CPT

## 2023-01-01 PROCEDURE — 87493 C DIFF AMPLIFIED PROBE: CPT

## 2023-01-01 PROCEDURE — G1004: CPT

## 2023-01-01 PROCEDURE — U0005: CPT

## 2023-01-01 PROCEDURE — 99204 OFFICE O/P NEW MOD 45 MIN: CPT | Mod: 95

## 2023-01-01 PROCEDURE — 83605 ASSAY OF LACTIC ACID: CPT

## 2023-01-01 PROCEDURE — 99285 EMERGENCY DEPT VISIT HI MDM: CPT

## 2023-01-01 PROCEDURE — 88112 CYTOPATH CELL ENHANCE TECH: CPT | Mod: 26

## 2023-01-01 PROCEDURE — 99214 OFFICE O/P EST MOD 30 MIN: CPT

## 2023-01-01 PROCEDURE — 85027 COMPLETE CBC AUTOMATED: CPT

## 2023-01-01 PROCEDURE — 88112 CYTOPATH CELL ENHANCE TECH: CPT

## 2023-01-01 PROCEDURE — 99441: CPT

## 2023-01-01 PROCEDURE — 84157 ASSAY OF PROTEIN OTHER: CPT

## 2023-01-01 PROCEDURE — 86900 BLOOD TYPING SEROLOGIC ABO: CPT

## 2023-01-01 PROCEDURE — 93005 ELECTROCARDIOGRAM TRACING: CPT

## 2023-01-01 PROCEDURE — 0225U NFCT DS DNA&RNA 21 SARSCOV2: CPT

## 2023-01-01 PROCEDURE — ZZZZZ: CPT

## 2023-01-01 PROCEDURE — 86803 HEPATITIS C AB TEST: CPT

## 2023-01-01 PROCEDURE — 74177 CT ABD & PELVIS W/CONTRAST: CPT | Mod: MA

## 2023-01-01 PROCEDURE — 87040 BLOOD CULTURE FOR BACTERIA: CPT

## 2023-01-01 PROCEDURE — 74177 CT ABD & PELVIS W/CONTRAST: CPT | Mod: 26

## 2023-01-01 PROCEDURE — 86850 RBC ANTIBODY SCREEN: CPT

## 2023-01-01 PROCEDURE — 44188 LAP COLOSTOMY: CPT

## 2023-01-01 PROCEDURE — 99284 EMERGENCY DEPT VISIT MOD MDM: CPT | Mod: 25

## 2023-01-01 PROCEDURE — 99284 EMERGENCY DEPT VISIT MOD MDM: CPT

## 2023-01-01 PROCEDURE — 85025 COMPLETE CBC W/AUTO DIFF WBC: CPT

## 2023-01-01 PROCEDURE — 96374 THER/PROPH/DIAG INJ IV PUSH: CPT

## 2023-01-01 PROCEDURE — 74177 CT ABD & PELVIS W/CONTRAST: CPT | Mod: 26,MA

## 2023-01-01 PROCEDURE — 99223 1ST HOSP IP/OBS HIGH 75: CPT | Mod: 25

## 2023-01-01 PROCEDURE — 86901 BLOOD TYPING SEROLOGIC RH(D): CPT

## 2023-01-01 PROCEDURE — 70450 CT HEAD/BRAIN W/O DYE: CPT | Mod: MA

## 2023-01-01 PROCEDURE — 88305 TISSUE EXAM BY PATHOLOGIST: CPT | Mod: 26

## 2023-01-01 PROCEDURE — 83735 ASSAY OF MAGNESIUM: CPT

## 2023-01-01 PROCEDURE — 80076 HEPATIC FUNCTION PANEL: CPT

## 2023-01-01 PROCEDURE — 84100 ASSAY OF PHOSPHORUS: CPT

## 2023-01-01 PROCEDURE — 74177 CT ABD & PELVIS W/CONTRAST: CPT

## 2023-01-01 PROCEDURE — 71260 CT THORAX DX C+: CPT | Mod: 26,MG

## 2023-01-01 PROCEDURE — 87075 CULTR BACTERIA EXCEPT BLOOD: CPT

## 2023-01-01 RX ORDER — SODIUM CHLORIDE 9 MG/ML
250 INJECTION INTRAMUSCULAR; INTRAVENOUS; SUBCUTANEOUS ONCE
Refills: 0 | Status: COMPLETED | OUTPATIENT
Start: 2023-01-01 | End: 2023-01-01

## 2023-01-01 RX ORDER — ENOXAPARIN SODIUM 100 MG/ML
40 INJECTION SUBCUTANEOUS EVERY 24 HOURS
Refills: 0 | Status: DISCONTINUED | OUTPATIENT
Start: 2023-01-01 | End: 2023-01-01

## 2023-01-01 RX ORDER — FENTANYL CITRATE 50 UG/ML
50 INJECTION INTRAVENOUS
Refills: 0 | Status: DISCONTINUED | OUTPATIENT
Start: 2023-01-01 | End: 2023-01-01

## 2023-01-01 RX ORDER — FENTANYL CITRATE 50 UG/ML
25 INJECTION INTRAVENOUS
Refills: 0 | Status: DISCONTINUED | OUTPATIENT
Start: 2023-01-01 | End: 2023-01-01

## 2023-01-01 RX ORDER — SODIUM CHLORIDE 9 MG/ML
1000 INJECTION, SOLUTION INTRAVENOUS
Refills: 0 | Status: DISCONTINUED | OUTPATIENT
Start: 2023-01-01 | End: 2023-01-01

## 2023-01-01 RX ORDER — VANCOMYCIN HCL 1 G
1000 VIAL (EA) INTRAVENOUS ONCE
Refills: 0 | Status: COMPLETED | OUTPATIENT
Start: 2023-01-01 | End: 2023-01-01

## 2023-01-01 RX ORDER — MAGNESIUM SULFATE 500 MG/ML
2 VIAL (ML) INJECTION ONCE
Refills: 0 | Status: COMPLETED | OUTPATIENT
Start: 2023-01-01 | End: 2023-01-01

## 2023-01-01 RX ORDER — MORPHINE SULFATE 50 MG/1
0.5 CAPSULE, EXTENDED RELEASE ORAL EVERY 4 HOURS
Refills: 0 | Status: DISCONTINUED | OUTPATIENT
Start: 2023-01-01 | End: 2023-01-01

## 2023-01-01 RX ORDER — MORPHINE SULFATE 50 MG/1
1 CAPSULE, EXTENDED RELEASE ORAL EVERY 4 HOURS
Refills: 0 | Status: DISCONTINUED | OUTPATIENT
Start: 2023-01-01 | End: 2023-01-01

## 2023-01-01 RX ORDER — ONDANSETRON 8 MG/1
4 TABLET, FILM COATED ORAL EVERY 8 HOURS
Refills: 0 | Status: DISCONTINUED | OUTPATIENT
Start: 2023-01-01 | End: 2023-01-01

## 2023-01-01 RX ORDER — HYDROMORPHONE HYDROCHLORIDE 2 MG/ML
0.5 INJECTION INTRAMUSCULAR; INTRAVENOUS; SUBCUTANEOUS
Refills: 0 | Status: DISCONTINUED | OUTPATIENT
Start: 2023-01-01 | End: 2023-01-01

## 2023-01-01 RX ORDER — ACETAMINOPHEN 500 MG
1000 TABLET ORAL EVERY 6 HOURS
Refills: 0 | Status: COMPLETED | OUTPATIENT
Start: 2023-01-01 | End: 2023-01-01

## 2023-01-01 RX ORDER — PANTOPRAZOLE SODIUM 20 MG/1
40 TABLET, DELAYED RELEASE ORAL
Refills: 0 | Status: DISCONTINUED | OUTPATIENT
Start: 2023-01-01 | End: 2023-01-01

## 2023-01-01 RX ORDER — POTASSIUM CHLORIDE 20 MEQ
10 PACKET (EA) ORAL
Refills: 0 | Status: COMPLETED | OUTPATIENT
Start: 2023-01-01 | End: 2023-01-01

## 2023-01-01 RX ORDER — SODIUM CHLORIDE 9 MG/ML
1000 INJECTION, SOLUTION INTRAVENOUS ONCE
Refills: 0 | Status: COMPLETED | OUTPATIENT
Start: 2023-01-01 | End: 2023-01-01

## 2023-01-01 RX ORDER — POTASSIUM CHLORIDE 20 MEQ
20 PACKET (EA) ORAL ONCE
Refills: 0 | Status: COMPLETED | OUTPATIENT
Start: 2023-01-01 | End: 2023-01-01

## 2023-01-01 RX ORDER — LANOLIN ALCOHOL/MO/W.PET/CERES
3 CREAM (GRAM) TOPICAL AT BEDTIME
Refills: 0 | Status: DISCONTINUED | OUTPATIENT
Start: 2023-01-01 | End: 2023-01-01

## 2023-01-01 RX ORDER — CLONAZEPAM 1 MG
0.5 TABLET ORAL EVERY 8 HOURS
Refills: 0 | Status: DISCONTINUED | OUTPATIENT
Start: 2023-01-01 | End: 2023-01-01

## 2023-01-01 RX ORDER — HEPARIN SODIUM 5000 [USP'U]/ML
5000 INJECTION INTRAVENOUS; SUBCUTANEOUS EVERY 12 HOURS
Refills: 0 | Status: DISCONTINUED | OUTPATIENT
Start: 2023-01-01 | End: 2023-01-01

## 2023-01-01 RX ORDER — VANCOMYCIN HCL 1 G
750 VIAL (EA) INTRAVENOUS ONCE
Refills: 0 | Status: COMPLETED | OUTPATIENT
Start: 2023-01-01 | End: 2023-01-01

## 2023-01-01 RX ORDER — ONDANSETRON 8 MG/1
8 TABLET, FILM COATED ORAL ONCE
Refills: 0 | Status: DISCONTINUED | OUTPATIENT
Start: 2023-01-01 | End: 2023-01-01

## 2023-01-01 RX ORDER — DEXTROSE MONOHYDRATE, SODIUM CHLORIDE, AND POTASSIUM CHLORIDE 50; .745; 4.5 G/1000ML; G/1000ML; G/1000ML
1000 INJECTION, SOLUTION INTRAVENOUS
Refills: 0 | Status: DISCONTINUED | OUTPATIENT
Start: 2023-01-01 | End: 2023-01-01

## 2023-01-01 RX ORDER — MORPHINE SULFATE 50 MG/1
15 CAPSULE, EXTENDED RELEASE ORAL EVERY 4 HOURS
Refills: 0 | Status: DISCONTINUED | OUTPATIENT
Start: 2023-01-01 | End: 2023-01-01

## 2023-01-01 RX ORDER — ACETAMINOPHEN 500 MG
650 TABLET ORAL EVERY 6 HOURS
Refills: 0 | Status: DISCONTINUED | OUTPATIENT
Start: 2023-01-01 | End: 2023-01-01

## 2023-01-01 RX ORDER — SODIUM CHLORIDE 9 MG/ML
1500 INJECTION, SOLUTION INTRAVENOUS ONCE
Refills: 0 | Status: COMPLETED | OUTPATIENT
Start: 2023-01-01 | End: 2023-01-01

## 2023-01-01 RX ORDER — CLONAZEPAM 1 MG
0.5 TABLET ORAL ONCE
Refills: 0 | Status: DISCONTINUED | OUTPATIENT
Start: 2023-01-01 | End: 2023-01-01

## 2023-01-01 RX ORDER — LANOLIN ALCOHOL/MO/W.PET/CERES
5 CREAM (GRAM) TOPICAL AT BEDTIME
Refills: 0 | Status: DISCONTINUED | OUTPATIENT
Start: 2023-01-01 | End: 2023-01-01

## 2023-01-01 RX ORDER — CEFEPIME 1 G/1
1000 INJECTION, POWDER, FOR SOLUTION INTRAMUSCULAR; INTRAVENOUS ONCE
Refills: 0 | Status: DISCONTINUED | OUTPATIENT
Start: 2023-01-01 | End: 2023-01-01

## 2023-01-01 RX ORDER — IBUPROFEN 200 MG
600 TABLET ORAL EVERY 8 HOURS
Refills: 0 | Status: DISCONTINUED | OUTPATIENT
Start: 2023-01-01 | End: 2023-01-01

## 2023-01-01 RX ORDER — POTASSIUM CHLORIDE 20 MEQ
40 PACKET (EA) ORAL ONCE
Refills: 0 | Status: COMPLETED | OUTPATIENT
Start: 2023-01-01 | End: 2023-01-01

## 2023-01-01 RX ORDER — HYDROMORPHONE HYDROCHLORIDE 2 MG/ML
2 INJECTION INTRAMUSCULAR; INTRAVENOUS; SUBCUTANEOUS THREE TIMES A DAY
Refills: 0 | Status: DISCONTINUED | OUTPATIENT
Start: 2023-01-01 | End: 2023-01-01

## 2023-01-01 RX ORDER — ACETAMINOPHEN 500 MG
975 TABLET ORAL EVERY 8 HOURS
Refills: 0 | Status: DISCONTINUED | OUTPATIENT
Start: 2023-01-01 | End: 2023-01-01

## 2023-01-01 RX ORDER — POTASSIUM CHLORIDE 1500 MG/1
20 TABLET, FILM COATED, EXTENDED RELEASE ORAL 3 TIMES DAILY
Qty: 180 | Refills: 0 | Status: ACTIVE | COMMUNITY
Start: 2022-01-01 | End: 1900-01-01

## 2023-01-01 RX ORDER — ONDANSETRON 8 MG/1
4 TABLET, FILM COATED ORAL ONCE
Refills: 0 | Status: COMPLETED | OUTPATIENT
Start: 2023-01-01 | End: 2023-01-01

## 2023-01-01 RX ORDER — HYDROMORPHONE HYDROCHLORIDE 2 MG/ML
1 INJECTION INTRAMUSCULAR; INTRAVENOUS; SUBCUTANEOUS ONCE
Refills: 0 | Status: DISCONTINUED | OUTPATIENT
Start: 2023-01-01 | End: 2023-01-01

## 2023-01-01 RX ORDER — MORPHINE SULFATE 50 MG/1
1 CAPSULE, EXTENDED RELEASE ORAL
Refills: 0 | DISCHARGE

## 2023-01-01 RX ORDER — POTASSIUM CHLORIDE 20 MEQ
10 PACKET (EA) ORAL
Refills: 0 | Status: DISCONTINUED | OUTPATIENT
Start: 2023-01-01 | End: 2023-01-01

## 2023-01-01 RX ORDER — MAGNESIUM SULFATE 500 MG/ML
1 VIAL (ML) INJECTION ONCE
Refills: 0 | Status: COMPLETED | OUTPATIENT
Start: 2023-01-01 | End: 2023-01-01

## 2023-01-01 RX ORDER — CEFOTETAN DISODIUM 1 G
2 VIAL (EA) INJECTION ONCE
Refills: 0 | Status: DISCONTINUED | OUTPATIENT
Start: 2023-01-01 | End: 2023-01-01

## 2023-01-01 RX ORDER — ONDANSETRON 8 MG/1
4 TABLET, FILM COATED ORAL EVERY 4 HOURS
Refills: 0 | Status: DISCONTINUED | OUTPATIENT
Start: 2023-01-01 | End: 2023-01-01

## 2023-01-01 RX ORDER — MORPHINE SULFATE 50 MG/1
10 CAPSULE, EXTENDED RELEASE ORAL EVERY 4 HOURS
Refills: 0 | Status: DISCONTINUED | OUTPATIENT
Start: 2023-01-01 | End: 2023-01-01

## 2023-01-01 RX ORDER — CLONAZEPAM 1 MG
0.5 TABLET ORAL THREE TIMES A DAY
Refills: 0 | Status: DISCONTINUED | OUTPATIENT
Start: 2023-01-01 | End: 2023-01-01

## 2023-01-01 RX ORDER — KETOROLAC TROMETHAMINE 30 MG/ML
15 SYRINGE (ML) INJECTION EVERY 6 HOURS
Refills: 0 | Status: DISCONTINUED | OUTPATIENT
Start: 2023-01-01 | End: 2023-01-01

## 2023-01-01 RX ORDER — INFLUENZA VIRUS VACCINE 15; 15; 15; 15 UG/.5ML; UG/.5ML; UG/.5ML; UG/.5ML
0.5 SUSPENSION INTRAMUSCULAR ONCE
Refills: 0 | Status: COMPLETED | OUTPATIENT
Start: 2023-01-01 | End: 2023-01-01

## 2023-01-01 RX ORDER — VENLAFAXINE HCL 75 MG
1 CAPSULE, EXT RELEASE 24 HR ORAL
Refills: 0 | DISCHARGE

## 2023-01-01 RX ORDER — OXYCODONE HYDROCHLORIDE 5 MG/1
5 CAPSULE ORAL
Qty: 20 | Refills: 0 | Status: ACTIVE | COMMUNITY
Start: 2023-01-01 | End: 1900-01-01

## 2023-01-01 RX ORDER — VENLAFAXINE HCL 75 MG
75 CAPSULE, EXT RELEASE 24 HR ORAL EVERY 24 HOURS
Refills: 0 | Status: DISCONTINUED | OUTPATIENT
Start: 2023-01-01 | End: 2023-01-01

## 2023-01-01 RX ORDER — HEPARIN SODIUM 5000 [USP'U]/ML
5000 INJECTION INTRAVENOUS; SUBCUTANEOUS EVERY 8 HOURS
Refills: 0 | Status: DISCONTINUED | OUTPATIENT
Start: 2023-01-01 | End: 2023-01-01

## 2023-01-01 RX ORDER — POTASSIUM CHLORIDE 20 MEQ
20 PACKET (EA) ORAL
Refills: 0 | Status: COMPLETED | OUTPATIENT
Start: 2023-01-01 | End: 2023-01-01

## 2023-01-01 RX ORDER — POTASSIUM PHOSPHATE, MONOBASIC POTASSIUM PHOSPHATE, DIBASIC 236; 224 MG/ML; MG/ML
15 INJECTION, SOLUTION INTRAVENOUS ONCE
Refills: 0 | Status: COMPLETED | OUTPATIENT
Start: 2023-01-01 | End: 2023-01-01

## 2023-01-01 RX ORDER — OXYCODONE HYDROCHLORIDE 5 MG/1
1 TABLET ORAL
Qty: 8 | Refills: 0
Start: 2023-01-01 | End: 2023-01-01

## 2023-01-01 RX ORDER — POTASSIUM CHLORIDE 20 MEQ
40 PACKET (EA) ORAL DAILY
Refills: 0 | Status: DISCONTINUED | OUTPATIENT
Start: 2023-01-01 | End: 2023-01-01

## 2023-01-01 RX ORDER — POTASSIUM CHLORIDE 20 MEQ
10 PACKET (EA) ORAL ONCE
Refills: 0 | Status: COMPLETED | OUTPATIENT
Start: 2023-01-01 | End: 2023-01-01

## 2023-01-01 RX ORDER — PANTOPRAZOLE SODIUM 20 MG/1
40 TABLET, DELAYED RELEASE ORAL DAILY
Refills: 0 | Status: DISCONTINUED | OUTPATIENT
Start: 2023-01-01 | End: 2023-01-01

## 2023-01-01 RX ORDER — OXYCODONE 5 MG/1
5 TABLET ORAL
Qty: 90 | Refills: 0 | Status: ACTIVE | COMMUNITY
Start: 2022-01-01 | End: 1900-01-01

## 2023-01-01 RX ORDER — ENOXAPARIN SODIUM 100 MG/ML
40 INJECTION SUBCUTANEOUS ONCE
Refills: 0 | Status: COMPLETED | OUTPATIENT
Start: 2023-01-01 | End: 2023-01-01

## 2023-01-01 RX ORDER — OXYCODONE HYDROCHLORIDE 5 MG/1
5 TABLET ORAL EVERY 4 HOURS
Refills: 0 | Status: DISCONTINUED | OUTPATIENT
Start: 2023-01-01 | End: 2023-01-01

## 2023-01-01 RX ORDER — PIPERACILLIN AND TAZOBACTAM 4; .5 G/20ML; G/20ML
3.38 INJECTION, POWDER, LYOPHILIZED, FOR SOLUTION INTRAVENOUS ONCE
Refills: 0 | Status: COMPLETED | OUTPATIENT
Start: 2023-01-01 | End: 2023-01-01

## 2023-01-01 RX ORDER — CEFEPIME 1 G/1
1000 INJECTION, POWDER, FOR SOLUTION INTRAMUSCULAR; INTRAVENOUS EVERY 12 HOURS
Refills: 0 | Status: DISCONTINUED | OUTPATIENT
Start: 2023-01-01 | End: 2023-01-01

## 2023-01-01 RX ADMIN — Medication 15 MILLIGRAM(S): at 00:04

## 2023-01-01 RX ADMIN — OXYCODONE HYDROCHLORIDE 5 MILLIGRAM(S): 5 TABLET ORAL at 12:10

## 2023-01-01 RX ADMIN — PANTOPRAZOLE SODIUM 40 MILLIGRAM(S): 20 TABLET, DELAYED RELEASE ORAL at 23:04

## 2023-01-01 RX ADMIN — Medication 975 MILLIGRAM(S): at 14:02

## 2023-01-01 RX ADMIN — HYDROMORPHONE HYDROCHLORIDE 0.5 MILLIGRAM(S): 2 INJECTION INTRAMUSCULAR; INTRAVENOUS; SUBCUTANEOUS at 20:49

## 2023-01-01 RX ADMIN — SODIUM CHLORIDE 100 MILLILITER(S): 9 INJECTION, SOLUTION INTRAVENOUS at 05:56

## 2023-01-01 RX ADMIN — Medication 975 MILLIGRAM(S): at 05:49

## 2023-01-01 RX ADMIN — OXYCODONE HYDROCHLORIDE 5 MILLIGRAM(S): 5 TABLET ORAL at 04:08

## 2023-01-01 RX ADMIN — Medication 975 MILLIGRAM(S): at 13:38

## 2023-01-01 RX ADMIN — PIPERACILLIN AND TAZOBACTAM 200 GRAM(S): 4; .5 INJECTION, POWDER, LYOPHILIZED, FOR SOLUTION INTRAVENOUS at 18:25

## 2023-01-01 RX ADMIN — MORPHINE SULFATE 15 MILLIGRAM(S): 50 CAPSULE, EXTENDED RELEASE ORAL at 06:46

## 2023-01-01 RX ADMIN — Medication 100 MILLIEQUIVALENT(S): at 06:08

## 2023-01-01 RX ADMIN — Medication 600 MILLIGRAM(S): at 05:46

## 2023-01-01 RX ADMIN — Medication 100 MILLIEQUIVALENT(S): at 08:12

## 2023-01-01 RX ADMIN — Medication 20 MILLIEQUIVALENT(S): at 10:38

## 2023-01-01 RX ADMIN — Medication 600 MILLIGRAM(S): at 22:14

## 2023-01-01 RX ADMIN — MORPHINE SULFATE 15 MILLIGRAM(S): 50 CAPSULE, EXTENDED RELEASE ORAL at 01:32

## 2023-01-01 RX ADMIN — Medication 20 MILLIEQUIVALENT(S): at 12:14

## 2023-01-01 RX ADMIN — Medication 975 MILLIGRAM(S): at 22:14

## 2023-01-01 RX ADMIN — Medication 0.5 MILLIGRAM(S): at 12:14

## 2023-01-01 RX ADMIN — Medication 100 MILLIEQUIVALENT(S): at 15:53

## 2023-01-01 RX ADMIN — Medication 75 MILLIGRAM(S): at 20:33

## 2023-01-01 RX ADMIN — Medication 975 MILLIGRAM(S): at 06:23

## 2023-01-01 RX ADMIN — Medication 75 MILLIGRAM(S): at 22:44

## 2023-01-01 RX ADMIN — SODIUM CHLORIDE 100 MILLILITER(S): 9 INJECTION, SOLUTION INTRAVENOUS at 21:11

## 2023-01-01 RX ADMIN — OXYCODONE HYDROCHLORIDE 5 MILLIGRAM(S): 5 TABLET ORAL at 17:52

## 2023-01-01 RX ADMIN — ENOXAPARIN SODIUM 40 MILLIGRAM(S): 100 INJECTION SUBCUTANEOUS at 17:34

## 2023-01-01 RX ADMIN — Medication 600 MILLIGRAM(S): at 14:38

## 2023-01-01 RX ADMIN — Medication 15 MILLIGRAM(S): at 17:24

## 2023-01-01 RX ADMIN — OXYCODONE HYDROCHLORIDE 5 MILLIGRAM(S): 5 TABLET ORAL at 12:08

## 2023-01-01 RX ADMIN — Medication 15 MILLIGRAM(S): at 00:19

## 2023-01-01 RX ADMIN — Medication 250 MILLIGRAM(S): at 19:02

## 2023-01-01 RX ADMIN — Medication 600 MILLIGRAM(S): at 06:28

## 2023-01-01 RX ADMIN — OXYCODONE HYDROCHLORIDE 5 MILLIGRAM(S): 5 TABLET ORAL at 16:19

## 2023-01-01 RX ADMIN — Medication 40 MILLIEQUIVALENT(S): at 16:52

## 2023-01-01 RX ADMIN — PANTOPRAZOLE SODIUM 40 MILLIGRAM(S): 20 TABLET, DELAYED RELEASE ORAL at 11:44

## 2023-01-01 RX ADMIN — SODIUM CHLORIDE 100 MILLILITER(S): 9 INJECTION, SOLUTION INTRAVENOUS at 21:06

## 2023-01-01 RX ADMIN — Medication 100 MILLIEQUIVALENT(S): at 11:33

## 2023-01-01 RX ADMIN — Medication 15 MILLIGRAM(S): at 12:40

## 2023-01-01 RX ADMIN — Medication 100 MILLIEQUIVALENT(S): at 14:02

## 2023-01-01 RX ADMIN — Medication 975 MILLIGRAM(S): at 06:28

## 2023-01-01 RX ADMIN — Medication 100 MILLIEQUIVALENT(S): at 17:14

## 2023-01-01 RX ADMIN — Medication 975 MILLIGRAM(S): at 14:44

## 2023-01-01 RX ADMIN — OXYCODONE HYDROCHLORIDE 5 MILLIGRAM(S): 5 TABLET ORAL at 16:52

## 2023-01-01 RX ADMIN — Medication 15 MILLIGRAM(S): at 09:01

## 2023-01-01 RX ADMIN — Medication 100 MILLIEQUIVALENT(S): at 12:55

## 2023-01-01 RX ADMIN — Medication 0.5 MILLIGRAM(S): at 17:13

## 2023-01-01 RX ADMIN — SODIUM CHLORIDE 100 MILLILITER(S): 9 INJECTION, SOLUTION INTRAVENOUS at 11:53

## 2023-01-01 RX ADMIN — Medication 600 MILLIGRAM(S): at 05:53

## 2023-01-01 RX ADMIN — Medication 100 GRAM(S): at 09:28

## 2023-01-01 RX ADMIN — Medication 40 MILLIEQUIVALENT(S): at 09:29

## 2023-01-01 RX ADMIN — Medication 40 MILLIEQUIVALENT(S): at 11:44

## 2023-01-01 RX ADMIN — MORPHINE SULFATE 15 MILLIGRAM(S): 50 CAPSULE, EXTENDED RELEASE ORAL at 00:32

## 2023-01-01 RX ADMIN — Medication 40 MILLIEQUIVALENT(S): at 13:38

## 2023-01-01 RX ADMIN — ENOXAPARIN SODIUM 40 MILLIGRAM(S): 100 INJECTION SUBCUTANEOUS at 18:27

## 2023-01-01 RX ADMIN — Medication 25 GRAM(S): at 08:21

## 2023-01-01 RX ADMIN — Medication 15 MILLIGRAM(S): at 13:07

## 2023-01-01 RX ADMIN — Medication 600 MILLIGRAM(S): at 22:44

## 2023-01-01 RX ADMIN — Medication 975 MILLIGRAM(S): at 23:44

## 2023-01-01 RX ADMIN — SODIUM CHLORIDE 1000 MILLILITER(S): 9 INJECTION, SOLUTION INTRAVENOUS at 18:37

## 2023-01-01 RX ADMIN — SODIUM CHLORIDE 100 MILLILITER(S): 9 INJECTION, SOLUTION INTRAVENOUS at 10:28

## 2023-01-01 RX ADMIN — Medication 400 MILLIGRAM(S): at 23:53

## 2023-01-01 RX ADMIN — MORPHINE SULFATE 1 MILLIGRAM(S): 50 CAPSULE, EXTENDED RELEASE ORAL at 14:35

## 2023-01-01 RX ADMIN — Medication 600 MILLIGRAM(S): at 14:03

## 2023-01-01 RX ADMIN — Medication 75 MILLIGRAM(S): at 22:21

## 2023-01-01 RX ADMIN — Medication 100 MILLIEQUIVALENT(S): at 12:51

## 2023-01-01 RX ADMIN — Medication 62.5 MILLIMOLE(S): at 11:01

## 2023-01-01 RX ADMIN — Medication 400 MILLIGRAM(S): at 17:25

## 2023-01-01 RX ADMIN — OXYCODONE HYDROCHLORIDE 5 MILLIGRAM(S): 5 TABLET ORAL at 11:46

## 2023-01-01 RX ADMIN — FENTANYL CITRATE 25 MICROGRAM(S): 50 INJECTION INTRAVENOUS at 19:39

## 2023-01-01 RX ADMIN — ENOXAPARIN SODIUM 40 MILLIGRAM(S): 100 INJECTION SUBCUTANEOUS at 17:23

## 2023-01-01 RX ADMIN — SODIUM CHLORIDE 1000 MILLILITER(S): 9 INJECTION INTRAMUSCULAR; INTRAVENOUS; SUBCUTANEOUS at 09:25

## 2023-01-01 RX ADMIN — SODIUM CHLORIDE 100 MILLILITER(S): 9 INJECTION, SOLUTION INTRAVENOUS at 21:41

## 2023-01-01 RX ADMIN — ENOXAPARIN SODIUM 40 MILLIGRAM(S): 100 INJECTION SUBCUTANEOUS at 16:52

## 2023-01-01 RX ADMIN — Medication 600 MILLIGRAM(S): at 05:01

## 2023-01-01 RX ADMIN — OXYCODONE HYDROCHLORIDE 5 MILLIGRAM(S): 5 TABLET ORAL at 11:43

## 2023-01-01 RX ADMIN — Medication 100 MILLIEQUIVALENT(S): at 11:18

## 2023-01-01 RX ADMIN — SODIUM CHLORIDE 60 MILLILITER(S): 9 INJECTION, SOLUTION INTRAVENOUS at 03:10

## 2023-01-01 RX ADMIN — HYDROMORPHONE HYDROCHLORIDE 0.5 MILLIGRAM(S): 2 INJECTION INTRAMUSCULAR; INTRAVENOUS; SUBCUTANEOUS at 21:11

## 2023-01-01 RX ADMIN — Medication 600 MILLIGRAM(S): at 23:44

## 2023-01-01 RX ADMIN — Medication 975 MILLIGRAM(S): at 05:25

## 2023-01-01 RX ADMIN — Medication 5 MILLIGRAM(S): at 21:20

## 2023-01-01 RX ADMIN — Medication 975 MILLIGRAM(S): at 14:38

## 2023-01-01 RX ADMIN — MORPHINE SULFATE 15 MILLIGRAM(S): 50 CAPSULE, EXTENDED RELEASE ORAL at 18:45

## 2023-01-01 RX ADMIN — Medication 100 MILLIEQUIVALENT(S): at 19:01

## 2023-01-01 RX ADMIN — SODIUM CHLORIDE 80 MILLILITER(S): 9 INJECTION, SOLUTION INTRAVENOUS at 10:11

## 2023-01-01 RX ADMIN — MORPHINE SULFATE 15 MILLIGRAM(S): 50 CAPSULE, EXTENDED RELEASE ORAL at 18:15

## 2023-01-01 RX ADMIN — Medication 600 MILLIGRAM(S): at 13:38

## 2023-01-01 RX ADMIN — MORPHINE SULFATE 15 MILLIGRAM(S): 50 CAPSULE, EXTENDED RELEASE ORAL at 05:46

## 2023-01-01 RX ADMIN — Medication 975 MILLIGRAM(S): at 22:44

## 2023-01-01 RX ADMIN — OXYCODONE HYDROCHLORIDE 5 MILLIGRAM(S): 5 TABLET ORAL at 09:49

## 2023-01-01 RX ADMIN — Medication 975 MILLIGRAM(S): at 21:20

## 2023-01-01 RX ADMIN — Medication 400 MILLIGRAM(S): at 06:26

## 2023-01-01 RX ADMIN — Medication 400 MILLIGRAM(S): at 11:52

## 2023-01-01 RX ADMIN — Medication 0.5 MILLIGRAM(S): at 13:38

## 2023-01-01 RX ADMIN — MORPHINE SULFATE 0.5 MILLIGRAM(S): 50 CAPSULE, EXTENDED RELEASE ORAL at 21:57

## 2023-01-01 RX ADMIN — OXYCODONE HYDROCHLORIDE 5 MILLIGRAM(S): 5 TABLET ORAL at 09:22

## 2023-01-01 RX ADMIN — Medication 1000 MILLIGRAM(S): at 17:55

## 2023-01-01 RX ADMIN — OXYCODONE HYDROCHLORIDE 5 MILLIGRAM(S): 5 TABLET ORAL at 16:04

## 2023-01-01 RX ADMIN — OXYCODONE HYDROCHLORIDE 5 MILLIGRAM(S): 5 TABLET ORAL at 09:52

## 2023-01-01 RX ADMIN — Medication 600 MILLIGRAM(S): at 05:55

## 2023-01-01 RX ADMIN — CEFEPIME 1000 MILLIGRAM(S): 1 INJECTION, POWDER, FOR SOLUTION INTRAMUSCULAR; INTRAVENOUS at 18:34

## 2023-01-01 RX ADMIN — HYDROMORPHONE HYDROCHLORIDE 1 MILLIGRAM(S): 2 INJECTION INTRAMUSCULAR; INTRAVENOUS; SUBCUTANEOUS at 20:09

## 2023-01-01 RX ADMIN — Medication 0.5 MILLIGRAM(S): at 21:05

## 2023-01-01 RX ADMIN — DEXTROSE MONOHYDRATE, SODIUM CHLORIDE, AND POTASSIUM CHLORIDE 100 MILLILITER(S): 50; .745; 4.5 INJECTION, SOLUTION INTRAVENOUS at 07:20

## 2023-01-01 RX ADMIN — Medication 975 MILLIGRAM(S): at 05:46

## 2023-01-01 RX ADMIN — OXYCODONE HYDROCHLORIDE 5 MILLIGRAM(S): 5 TABLET ORAL at 09:19

## 2023-01-01 RX ADMIN — Medication 20 MILLIEQUIVALENT(S): at 16:52

## 2023-01-01 RX ADMIN — OXYCODONE HYDROCHLORIDE 5 MILLIGRAM(S): 5 TABLET ORAL at 03:08

## 2023-01-01 RX ADMIN — Medication 975 MILLIGRAM(S): at 05:56

## 2023-01-01 RX ADMIN — Medication 975 MILLIGRAM(S): at 13:32

## 2023-01-01 RX ADMIN — Medication 0.5 MILLIGRAM(S): at 06:23

## 2023-01-01 RX ADMIN — ONDANSETRON 4 MILLIGRAM(S): 8 TABLET, FILM COATED ORAL at 03:13

## 2023-01-01 RX ADMIN — MORPHINE SULFATE 0.5 MILLIGRAM(S): 50 CAPSULE, EXTENDED RELEASE ORAL at 22:57

## 2023-01-01 RX ADMIN — POTASSIUM PHOSPHATE, MONOBASIC POTASSIUM PHOSPHATE, DIBASIC 62.5 MILLIMOLE(S): 236; 224 INJECTION, SOLUTION INTRAVENOUS at 12:13

## 2023-01-01 RX ADMIN — Medication 975 MILLIGRAM(S): at 21:14

## 2023-01-01 RX ADMIN — HYDROMORPHONE HYDROCHLORIDE 0.5 MILLIGRAM(S): 2 INJECTION INTRAMUSCULAR; INTRAVENOUS; SUBCUTANEOUS at 21:24

## 2023-01-01 RX ADMIN — MORPHINE SULFATE 10 MILLIGRAM(S): 50 CAPSULE, EXTENDED RELEASE ORAL at 15:40

## 2023-01-01 RX ADMIN — SODIUM CHLORIDE 250 MILLILITER(S): 9 INJECTION INTRAMUSCULAR; INTRAVENOUS; SUBCUTANEOUS at 12:17

## 2023-01-01 RX ADMIN — Medication 600 MILLIGRAM(S): at 13:44

## 2023-01-01 RX ADMIN — Medication 15 MILLIGRAM(S): at 17:14

## 2023-01-01 RX ADMIN — ENOXAPARIN SODIUM 40 MILLIGRAM(S): 100 INJECTION SUBCUTANEOUS at 18:23

## 2023-01-01 RX ADMIN — Medication 100 MILLIEQUIVALENT(S): at 16:52

## 2023-01-01 RX ADMIN — Medication 975 MILLIGRAM(S): at 05:01

## 2023-01-01 RX ADMIN — SODIUM CHLORIDE 75 MILLILITER(S): 9 INJECTION, SOLUTION INTRAVENOUS at 19:38

## 2023-01-01 RX ADMIN — ONDANSETRON 4 MILLIGRAM(S): 8 TABLET, FILM COATED ORAL at 18:50

## 2023-01-01 RX ADMIN — Medication 975 MILLIGRAM(S): at 13:44

## 2023-01-01 RX ADMIN — Medication 975 MILLIGRAM(S): at 05:53

## 2023-01-01 RX ADMIN — Medication 5 MILLIGRAM(S): at 23:31

## 2023-01-01 RX ADMIN — FENTANYL CITRATE 25 MICROGRAM(S): 50 INJECTION INTRAVENOUS at 19:18

## 2023-01-01 RX ADMIN — Medication 1000 MILLIGRAM(S): at 12:06

## 2023-01-01 RX ADMIN — Medication 40 MILLIEQUIVALENT(S): at 15:52

## 2023-01-01 RX ADMIN — ENOXAPARIN SODIUM 40 MILLIGRAM(S): 100 INJECTION SUBCUTANEOUS at 22:09

## 2023-01-01 RX ADMIN — HYDROMORPHONE HYDROCHLORIDE 0.5 MILLIGRAM(S): 2 INJECTION INTRAMUSCULAR; INTRAVENOUS; SUBCUTANEOUS at 21:42

## 2023-01-01 RX ADMIN — Medication 600 MILLIGRAM(S): at 13:32

## 2023-01-01 RX ADMIN — SODIUM CHLORIDE 1000 MILLILITER(S): 9 INJECTION, SOLUTION INTRAVENOUS at 14:36

## 2023-01-01 RX ADMIN — FENTANYL CITRATE 25 MICROGRAM(S): 50 INJECTION INTRAVENOUS at 19:50

## 2023-01-01 RX ADMIN — SODIUM CHLORIDE 100 MILLILITER(S): 9 INJECTION, SOLUTION INTRAVENOUS at 06:07

## 2023-01-01 RX ADMIN — Medication 250 MILLIGRAM(S): at 10:28

## 2023-01-01 RX ADMIN — FENTANYL CITRATE 25 MICROGRAM(S): 50 INJECTION INTRAVENOUS at 19:25

## 2023-01-01 RX ADMIN — Medication 25 GRAM(S): at 10:38

## 2023-01-01 RX ADMIN — Medication 75 MILLIGRAM(S): at 21:24

## 2023-01-01 RX ADMIN — Medication 20 MILLIEQUIVALENT(S): at 13:44

## 2023-01-01 RX ADMIN — Medication 600 MILLIGRAM(S): at 21:14

## 2023-01-01 RX ADMIN — MORPHINE SULFATE 1 MILLIGRAM(S): 50 CAPSULE, EXTENDED RELEASE ORAL at 14:05

## 2023-01-01 RX ADMIN — ONDANSETRON 4 MILLIGRAM(S): 8 TABLET, FILM COATED ORAL at 23:05

## 2023-01-01 RX ADMIN — CEFEPIME 1000 MILLIGRAM(S): 1 INJECTION, POWDER, FOR SOLUTION INTRAMUSCULAR; INTRAVENOUS at 09:25

## 2023-01-01 RX ADMIN — Medication 50 MILLIEQUIVALENT(S): at 09:02

## 2023-01-01 RX ADMIN — HEPARIN SODIUM 5000 UNIT(S): 5000 INJECTION INTRAVENOUS; SUBCUTANEOUS at 05:27

## 2023-01-01 RX ADMIN — Medication 75 MILLIGRAM(S): at 22:48

## 2023-01-01 RX ADMIN — Medication 100 MILLIEQUIVALENT(S): at 12:39

## 2023-01-01 RX ADMIN — Medication 1 MILLIGRAM(S): at 22:19

## 2023-01-01 RX ADMIN — Medication 75 MILLIGRAM(S): at 21:15

## 2023-01-01 RX ADMIN — Medication 600 MILLIGRAM(S): at 14:44

## 2023-01-01 RX ADMIN — OXYCODONE HYDROCHLORIDE 5 MILLIGRAM(S): 5 TABLET ORAL at 18:56

## 2023-01-01 RX ADMIN — SODIUM CHLORIDE 100 MILLILITER(S): 9 INJECTION, SOLUTION INTRAVENOUS at 11:01

## 2023-01-01 RX ADMIN — Medication 975 MILLIGRAM(S): at 22:20

## 2023-01-01 RX ADMIN — Medication 600 MILLIGRAM(S): at 06:23

## 2023-01-04 NOTE — HISTORY OF PRESENT ILLNESS
[FreeTextEntry1] : PRE CALL PRIOR TO APPOINTMENT: \par \par Phone Number: [phone]\par \par Last done: \par \par COVID-19 SWAB: advised yes\par \par RX on file: yes\par \par Referring MD: fabio\par \par Appointment date: 1/3/22\par \par Clotting or Bleeding disorders: no\par \par PPM / Defibrillator: no\par \par NPO status advised: n/a\par \par History of fall:    no\par \par Assistant device for walking: no\par \par  home: n/a\par \par Blood Thinners:     no                        \par \par Prescribing MD agree to have blood thinner medication held: n/a\par \par Capacity to make decisions: yes\par \par Person contact for Pre-Call: DD RN\par \par \par \par Crystal- Operative Assessment: (Day of Procedure)\par \par \par NPO status: n/a\par \par Accompanied by: self\par \par Falls risk: no\par \par Labs: see below\par \par IVL: 22g lac\par \par IR MD: Dr. Lazara Mcgarry \par \par Urine Pregnancy: n/a\par \par PRE-OP instructions: yes\par \par POST-OP teaching initiated: yes\par \par Allergy bracelet on: nkda\par \par Antibiotic given: n/a\par \par

## 2023-02-07 PROBLEM — R19.7 DIARRHEA: Status: ACTIVE | Noted: 2022-01-01

## 2023-02-08 NOTE — ED PROVIDER NOTE - PATIENT PORTAL LINK FT
You can access the FollowMyHealth Patient Portal offered by [facility] by registering at the following website: [email or web address]. By joining [facility]’s FollowMyHealth portal, you will also be able to view your health information using other applications (apps) compatible with our system.

## 2023-02-08 NOTE — ED ADULT NURSE NOTE - OBJECTIVE STATEMENT
Pt sent in for low potassium. Had blood work done yesterday at the [facility].

## 2023-02-08 NOTE — ED ADULT NURSE NOTE - HISTORY OF COVID-19 VACCINATION
Late entry.  Patient in clinic today 9/25/20.  We received a fax regarding her INR's and they remain at 1.0    Moira's phone number needs to be confirmed please.  Her dose of coumadin should be confirmed as well and if taking.    She should go to the lab at her appointment for an confirmed INR draw.    Will print this for appointment.    If her INR in lab is WNL - she will need to come back for a nurse visit and bring her INR machine with her.  
Moira's INR was taken today,and the result was 1.0  
Vaccine status unknown

## 2023-02-08 NOTE — ED PROVIDER NOTE - NS ED ROS FT
Gen: denies fever, chills, fatigue, weight loss  Skin: denies rashes, laceration, bruising  HEENT: denies visual changes, ear pain, nasal congestion, throat pain  Respiratory: denies DYER, SOB, cough, wheezing  Cardiovascular: denies chest pain, palpitations, diaphoresis, LE edema  GI: denies abdominal pain, n/v/d  : denies dysuria, frequency, urgency, bowel/bladder incontinence  MSK: denies joint swelling/pain, back pain, neck pain  Neuro: denies headache, dizziness, weakness, numbness  Psych: denies anxiety, depression, SI/HI, visual/auditory hallucinations

## 2023-02-08 NOTE — ED PROVIDER NOTE - OBJECTIVE STATEMENT
56yo F PMHx rectal Ca on chemo presents to ED sent by oncologist for hypokalemia on outpt labs. Had blood drawn yesterday, was called with results this morning that her K was 2 and that she needed to go to ED. Pt without any complaints, states she is feeling fine. Oncologist is Dr. Foy. Denies fever, chills, cp, sob, palpitations, n/v/d, fatigue, weakness.

## 2023-02-08 NOTE — ED PROVIDER NOTE - ATTENDING APP SHARED VISIT CONTRIBUTION OF CARE
58yo F presenting to ED sent in for low potassium on outpt blood work, reportedly had K 2.0. Labs repeated, K found to be 2.9. No EKG changes. pt asymptomatic. K repleted with PO K 40mEq and 2 K riders. pt tolerated medication well. advised to f/u with her oncologist. return precautions discussed.

## 2023-02-08 NOTE — ED PROVIDER NOTE - CLINICAL SUMMARY MEDICAL DECISION MAKING FREE TEXT BOX
58yo F presenting to ED sent in for low potassium on outpt blood work, reportedly had K 2.0. Labs repeated 58yo F presenting to ED sent in for low potassium on outpt blood work, reportedly had K 2.0. Labs repeated, K found to be 2.9. No EKG changes. pt asymptomatic. K repleted with PO K 40mEq and 2 K riders. pt tolerated medication well. advised to f/u with her oncologist. return precautions discussed.

## 2023-02-08 NOTE — ED PROVIDER NOTE - PHYSICAL EXAMINATION
Gen: No acute distress, non toxic  HENT: NCAT, Mucous membranes moist, Oropharynx without exudates, uvula midline  Eyes: pink conjunctivae, EOMI, PERRL  CV: RRR, nl s1/s2.  Resp: CTAB, normal rate and effort  GI: Abdomen soft, NT, ND. No rebound, no guarding  : No CVAT  Neuro: A&O x 3, CN II-XII intact, sensorimotor intact without deficits   MSK: No spine or joint tenderness to palpation, Full ROM ext x 4  Skin: +R chest wall port. No rashes. intact and perfused.

## 2023-02-08 NOTE — ED ADULT NURSE REASSESSMENT NOTE - NS ED NURSE REASSESS COMMENT FT1
Pt A&Ox4 c/o abnormal lab at this time. Pt resting comfortably, VSS, no signs of distress at this time, CM in place, to be discharged after IV medications safety maintained, call bell in reach.

## 2023-02-08 NOTE — ED PROVIDER NOTE - CARE PROVIDER_API CALL
Carlos Foy)  Internal Medicine  [address] [place] [address]  Phone: [phone]  Fax: [phone]  Follow Up Time:

## 2023-02-08 NOTE — ED ADULT TRIAGE NOTE - CHIEF COMPLAINT QUOTE
57F sent by oncologist for potassium level of 2 from bloods drawn yesterday. Pt with chemo infusing through Right chest wall port. Pt denies CP, denies SOB, no complaints, reports feels well.

## 2023-02-09 PROBLEM — C20 MALIGNANT NEOPLASM OF RECTUM: Chronic | Status: ACTIVE | Noted: 2023-01-01

## 2023-02-15 NOTE — HISTORY OF PRESENT ILLNESS
[FreeTextEntry1] : PRE CALL PRIOR TO APPOINTMENT: \par \par  Phone Number: [phone]\par \par  COVID-19 SWAB: advised\par \par  RX on file: yes\par \par  Referring MD: Chi\par \par  Appointment date: 2/15\par \par  Clotting or Bleeding disorders: no\par \par  PPM / Defibrillator: no\par \par  NPO status advised: na\par \par  History of fall: no\par \par  Assistant device for walking: no\par \par   home: na\par \par  Blood Thinners: no  \par \par  Prescribing MD agree to have blood thinner medication held: na\par \par  Capacity to make decisions: no\par \par  HCP: no\par \par  DNR: no\par \par  Person contact for Pre-Call: \par \par  Crystal- Operative Assessment: (Day of Procedure) \par \par  NPO status: na\par \par  Accompanied by: spouse\par \par  Falls risk: no\par  \par Labs: see attached\par \par  IVL: #22G R AC by DD\par \par  R MD: Dr. Lazara Mcgarry \par \par  Urine Pregnancy: na\par \par  PRE-OP instructions: provided\par \par  POST-OP teaching initiated: yes\par \par  Allergy bracelet on: na\par \par  Antibiotic given:na\par

## 2023-02-22 NOTE — HISTORY OF PRESENT ILLNESS
[Disease: _____________________] : Disease: [unfilled] [AJCC Stage: ____] : AJCC Stage: [unfilled] [de-identified] : 56 yo F with no significant PMH who was diagnosed with rectal cancer in August 2022.\par \par She had a screening colonoscopy on 8/16/2 due to a change in bowel habits which showed a near obstructing rectal mass from 6 cm above the anal verge to approximately 12 cm. Biopsies of the mass consistent with poorly differentiated adenocarcinoma with signet ring cells and focal mucin. \par \par Staging CT A/P demonstrated extensive ~ 6cm rectal mass with infiltration of the pelvic sidewalls. There was also findings of large amount of ascitic fluid consistent with malignant ascites as well as omental caking and peritoneal carcinomatosis. Cirrhotic liver. No focal masses. CEA 65. \par \par She saw colorectal surgery, Dr. Sara López, and systemic treatment was recommended. She reports being able to pass bowel movements, but with difficulty and not consistently. Complains of abd distention, worsened compared to 2 weeks ago. No family history of colon or rectal cancer. 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 She normaly stays active by [employer] and goes to the gym. \par \par former smoker\par \par social drinking\par \par  [de-identified] : NRAS G13D positive \par PD-L1 negative, TPS % = 0 \par \par MSI - equivocal [de-identified] : Patient presents on C10D1 FOLFOX for SIV rectal cancer with peritoneal carcinomatosis.\par + Abdominal discomfort from ascites, worse recently, denies SOB. + Rectal discomfort, unchanged. + H/F syndrome, grade 1/2, hyperpigmentation and xeroderma, no pain or swelling. + Cold intolerance, unchanged. + Neuropathy not associated with cold, intermittent, finger tips only. + Fatigue, unchanged. + Intermittent diarrhea, worse recently. Intermittent constipation resolved. No mucositis.  No fevers, cough or SOB. \par

## 2023-02-22 NOTE — ASSESSMENT
[FreeTextEntry1] : 58 yo F with no significant PMH who was diagnosed with rectal cancer in August 2022.\par Colonoscopy on 8/16/2 due to a change in bowel habits which showed a near obstructing rectal mass from 6 cm above the anal verge to approximately 12 cm. Biopsies of the mass consistent with poorly differentiated adenocarcinoma with signet ring cells and focal mucin. \par Staging CT A/P demonstrated extensive ~ 6cm rectal mass with infiltration of the pelvic sidewalls. There was also findings of large amount of ascitic fluid consistent with malignant ascites as well as omental caking and peritoneal carcinomatosis. Cirrhotic liver. No focal masses. CEA 65. \par \par # colon cancer with carcinomatosis\par -Tumor is RAFFI\par -NGS from [facility] showed KRAS and NRAS, Dr. Foy added Jami\par -PD-L1 negative, TPS % = 0 \par -On FOLFOX since 9/20/22.  Today is C10D1.  Jami added starting C5\par -will continue FOLFOX given mild neuropathy at this time.  Will D/C oxaliplatin if neuropathy worsens before C12\par -restaging CT sometime the week of 2/20. \par -would consider FOLFIRI if POD on next CT\par -RTC Q2 weeks for treatment and Q4 weeks for MD/PA follow up\par \par \par # supportive\par -Ascites - will arrange for paracentesis given increased ascites \par -N/V - zofran, compazine\par -imodium as needed for intermittent diarrhea \par -OTC pain meds for abdominal and rectal mild/moderate discomfort and oxycodone for moderate/severe symptoms\par -patient's sister wants to have Coreg considered for portal HTN

## 2023-03-08 NOTE — ASSESSMENT
[FreeTextEntry1] : 56 yo F with no significant PMH who was diagnosed with rectal cancer in August 2022.\par Colonoscopy on 8/16/2 due to a change in bowel habits which showed a near obstructing rectal mass from 6 cm above the anal verge to approximately 12 cm. Biopsies of the mass consistent with poorly differentiated adenocarcinoma with signet ring cells and focal mucin. \par Staging CT A/P demonstrated extensive ~ 6cm rectal mass with infiltration of the pelvic sidewalls. There was also findings of large amount of ascitic fluid consistent with malignant ascites as well as omental caking and peritoneal carcinomatosis. Cirrhotic liver. No focal masses. CEA 65. \par \par # colon cancer with carcinomatosis\par -Tumor is RAFFI\par -NGS from [facility] showed KRAS and NRAS, Dr. Foy added Jami\par -PD-L1 negative, TPS % = 0 \par -On FOLFOX since 9/20/22.  Today is C10D1.  Jami added starting C5\par - Given neuropathies, prolonged cytopenias, will stop oxaliplatin. Patient received 10 cycles of oxaliplatin \par - Patient will schedule imaging. \par -would consider FOLFIRI if POD on next CT\par -RTC Q2 weeks for treatment and Q4 weeks for MD/PA follow up\par \par # hypokalemia \par - Potassium levels are low-- discussed the importance of being adherent to potassium supplementation. Patient understands that she should be taking an increased dose of potassium at this time (40 meq three times a day as previously discussed)\par - will continue to monitor. \par \par # supportive\par -Ascites - will arrange for paracentesis given increased ascites \par -N/V - zofran, compazine\par -imodium as needed for intermittent diarrhea \par -OTC pain meds for abdominal and rectal mild/moderate discomfort and oxycodone for moderate/severe symptoms\par -patient's sister wants to have Coreg considered for portal HTN \par

## 2023-03-08 NOTE — HISTORY OF PRESENT ILLNESS
[Disease: _____________________] : Disease: [unfilled] [AJCC Stage: ____] : AJCC Stage: [unfilled] [de-identified] : 56 yo F with no significant PMH who was diagnosed with rectal cancer in August 2022.\par \par She had a screening colonoscopy on 8/16/2 due to a change in bowel habits which showed a near obstructing rectal mass from 6 cm above the anal verge to approximately 12 cm. Biopsies of the mass consistent with poorly differentiated adenocarcinoma with signet ring cells and focal mucin. \par \par Staging CT A/P demonstrated extensive ~ 6cm rectal mass with infiltration of the pelvic sidewalls. There was also findings of large amount of ascitic fluid consistent with malignant ascites as well as omental caking and peritoneal carcinomatosis. Cirrhotic liver. No focal masses. CEA 65. \par \par She saw colorectal surgery, Dr. Sara López, and systemic treatment was recommended. She reports being able to pass bowel movements, but with difficulty and not consistently. Complains of abd distention, worsened compared to 2 weeks ago. No family history of colon or rectal cancer. 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 She normally stays active by jogging and goes to the gym. \par \par former smoker\par \par social drinking\par \par  [de-identified] : NRAS G13D positive \par PD-L1 negative, TPS % = 0 \par \par MSI - equivocal [de-identified] : Patient now on FOLFOX for SIV rectal cancer with peritoneal carcinomatosis.\par \par The patient continues to have ascites, abdominal distension and discomfort, some rectal bleeding that is intermittent. Neuropathy is present and stable, affecting fingers and toes. Diarrhea improved, but worsens with each infusion. Last infusion (cycle 11) held due to continued cytopenias. The patient is only taking potassium twice a day. \par

## 2023-03-10 NOTE — H&P ADULT - TIME BILLING
Patient seen and examined, chart including vitals, labs, and imaging all reviewed.  This is a 57yoF with known metastatic rectal cancer undergoing chemotherapy.  Of note, she was noted to have peritoneal metastases on prior imaging, and regularly undergoes paracentesis for malignant ascites.  She underwent a CT scan yesterday as part of routine follow-up and was found to have significantly dilated colon.  She was instructed by her Oncologist to present to the hospital.  GI evaluated the patient yesterday and do not feel that the issue will be safely or adequately addressed by a stent.    She denies any pain.  Her exam is markedly distended and tympanic but no tenderness whatsoever, no rebound/guarding.  On review of prior imaging from December it also appears that her colon is fairly dilated then, though not to the same degree.  Strongly suspect that this is a chronic issue.  She does continue to pass flatus and liquid BMs.  C difficile test was checked yesterday and noted negative.  On yesterday's CT her small bowel is noted to be collapsed, indicating a competent ileocecal valve.  Also note ascites.    I discussed with the patient that she is not currently fully obstructed, as she continues to pass flatus and BMs.  However the significant colonic dilation is concerning and could lead to eventual perforation if the impending closed-loop obstruction is allowed to persist.  To that end, the next course of action would be to perform a diverting colostomy.  Dr. López, who previously followed the patient in the outpatient setting, is available to perform this on Monday.  Case is on the add-on list.  The patient was understandably shocked but expressed comprehension of the situation.     -- Will request Anesthesia evaluation of patient in anticipation of OR Monday.   If they request paracentesis prior to induction will involve IR.  -- NPO, IV fluids.  -- Serial abdominal exams  -- Medical clearance with hospitalist  -- If available will request ostomy nurse marking; however this is made more difficult by the patient's distention.  Furthermore her disease may limit the mobility of her colon to the proposed ostomy site.  -- Will pre-op in anticipation of Monday case Patient seen and examined, chart including vitals, labs, and imaging all reviewed.  This is a 57yoF with known metastatic rectal cancer undergoing chemotherapy.  Of note, she was noted to have peritoneal metastases on prior imaging, and regularly undergoes paracentesis for malignant ascites.  She underwent a CT scan yesterday as part of routine follow-up and was found to have significantly dilated colon.  She was instructed by her Oncologist to present to the hospital.  GI evaluated the patient yesterday and do not feel that the issue will be safely or adequately addressed by a stent.    She denies any pain.  Her exam is markedly distended and tympanic but no tenderness whatsoever, no rebound/guarding.  On review of prior imaging from December it also appears that her colon is fairly dilated then, though not to the same degree.  Strongly suspect that this is a chronic issue.  She does continue to pass flatus and liquid BMs.  C difficile test was checked yesterday and noted negative.  On yesterday's CT her small bowel is noted to be collapsed, indicating a competent ileocecal valve.  Also note ascites.    I discussed with the patient that she is not currently fully obstructed, as she continues to pass flatus and BMs.  However the significant colonic dilation is concerning and could lead to eventual perforation if the impending closed-loop obstruction is allowed to persist.  To that end, the next course of action would be to perform a diverting colostomy.  Dr. López, who previously followed the patient in the outpatient setting, is available to perform this on Monday.  Case is on the add-on list.  The patient was understandably shocked but expressed comprehension of the situation.     -- Will request Anesthesia evaluation of patient in anticipation of OR Monday.   If they request paracentesis prior to induction will involve IR.  -- NPO, IV fluids.  -- Serial abdominal exams  -- Medical clearance with hospitalist  -- Palliative consult to facilitate goals of care discussions  -- If available will request ostomy nurse marking; however this is made more difficult by the patient's distention.  Furthermore her disease may limit the mobility of her colon to the proposed ostomy site.  -- Will pre-op in anticipation of Monday case

## 2023-03-10 NOTE — ED PROVIDER NOTE - PRINCIPAL DIAGNOSIS
12-hour chart check complete.    Monitor Summary  Rhythm: afib  Rate:   Ectopy: 85  Measurements: -/0.12/-    Large bowel obstruction

## 2023-03-10 NOTE — CONSULT NOTE ADULT - ASSESSMENT
57 year old female with metastatic rectal cancer here with large bowel obstruction    Metastatic Rectal Cancer with Large Bowel Obstruction  She is high risk for impending perforation WBC 31.5.  I do not believe a colonic stent is sufficient to manage this. Recommend colorectal surgery evaluation for colectomy  Check and correct lytes  Check lactate  NPO        Ascites   Likely malignant in origin  May attempt to mobilize the fluid with diuretics and schedule alma once acute large bowel obstruction is addressed.

## 2023-03-10 NOTE — PATIENT PROFILE ADULT - FALL HARM RISK - HARM RISK INTERVENTIONS

## 2023-03-10 NOTE — ED ADULT TRIAGE NOTE - CHIEF COMPLAINT QUOTE
sent in from DR Calabrese. had Ct abdomen today due to abd distension was told to come to ER because of results.  has rectal cancer

## 2023-03-10 NOTE — H&P ADULT - HISTORY OF PRESENT ILLNESS
57F with stage IV rectal cancer on FOLFOX chemotherapy sent in when monitoring CT showed large bowel obstruction 2/2 rectal mass. Patient states she has been distended and bloated for some time and is usually treated with paracentesis when they take off 12L. She is still passing gas but does acknowledge it is less than usual. She reports that though she usually has a few weeks of diarrhea after her chemo treatment, she has had them for a longer time since her last chemo treatment. Additionally, she says that with the abdominal distension, she has felt less of an appetite. She has not had any N/V.

## 2023-03-10 NOTE — ED PROVIDER NOTE - OBJECTIVE STATEMENT
Patient is a 57-year-old female with history of rectal cancer  stage IV on chemotherapy  presenting with large bowel obstruction.  Patient saw oncologist who obtained  maintenance CAT scan of the abdomen pelvis and diagnosis was made by radiologist patient referred to the emergency department.  Patient notes persistent diarrhea and abdominal distention.  Patient had paracentesis about 3 weeks ago denies any fevers or chills, nausea or vomiting.  No blood black or bloody stool.  No altered mental status.  Patient sent in for surgical oncology evaluation.

## 2023-03-10 NOTE — H&P ADULT - NSHPLABSRESULTS_GEN_ALL_CORE
LABS  CBC (03-10 @ 16:08)                              11.7                           31.50<H>  )----------------(  76<L>      82.6<H>% Neutrophils, 9.6<L>% Lymphocytes, ANC: 26.30<H>                              35.8      BMP (03-10 @ 16:08)             134<L>  |  101     |  14.5  		Ca++ --      Ca 9.7                ---------------------------------( 113<H>		Mg --                 4.7     |  26.0    |  0.60  			Ph --        LFTs (03-10 @ 16:08)      TPro 6.5<L> / Alb 4.0 / TBili 0.6 / DBili -- / AST 36<H> / ALT 19 / AlkPhos 223<H>    Coags (03-10 @ 16:08)  aPTT 41.2<H> / INR 1.03 / PT 12.0    --------------------------------------------------------------------------------------------    IMAGING  < from: CT Abdomen and Pelvis w/ Oral Cont and w/ IV Cont (03.10.23 @ 12:00) >    FINDINGS:  CHEST:  LUNGS AND LARGE AIRWAYS: Patent central airways. No suspicious pulmonary   nodule.  PLEURA: No pleural effusion.  VESSELS: Within normal limits.  HEART: Heart size is normal. No pericardial effusion.  MEDIASTINUM AND HALEY: Unchanged nodularity along the lower anterior   pericardium.  CHEST WALL AND LOWER NECK: Right chest wall Mediport with catheter tip   terminating in the right atrium.    ABDOMEN AND PELVIS:  LIVER: Interval worsening of scalloping of the liver contour due to   ascites.  BILE DUCTS: Normal caliber.  GALLBLADDER: Within normal limits.  SPLEEN: Enlarged, unchanged.  PANCREAS: Within normal limits.  ADRENALS: Within normal limits.  KIDNEYS/URETERS: Within normal limits.    BLADDER: Within normal limits.  REPRODUCTIVE ORGANS: Heterogeneous enhancement of the uterus, nonspecific.    BOWEL: Severe rectal wall thickening and luminal narrowing which is   without significant change from 12/23/2022. However, there is marked   interval increase in upstream colonic dilatation with the sigmoid colon   measuring up to 8 cm in caliber and the transverse colon measuring up to   11 cm in caliber.  PERITONEUM: Large volume ascites is increased. Enhancing soft tissue   nodule within the right lower quadrant is without significant change   measuring 1.7 x 1.1 cm (3, 212), previously 1.6 x 1.1 cm. Otherwise,   previously seen areas of peritoneal carcinomatosis are no longer   discretely visualized.  VESSELS: Within normal limits.  RETROPERITONEUM/LYMPH NODES: No lymphadenopathy.  ABDOMINAL WALL: Diffuse subcutaneous edema.  BONES: Within normal limits.    IMPRESSION:  1.  Severe rectal wall-thickening with luminal narrowing is without   significant change from 12/23/2022, however there is new severe large   bowel obstruction due to the mass.  2.  Large volume ascites, markedly increased.  3.  Stable right lower quadrant soft tissue nodule/metastatic adenopathy.   Otherwise, previously seen areas of peritoneal carcinomatosis are no   longer discretely visualized.    < end of copied text >

## 2023-03-10 NOTE — CONSULT NOTE ADULT - SUBJECTIVE AND OBJECTIVE BOX
HISTORY OF PRESENT ILLNESS: This is a 57y old woman with a past medical history significant for     ROS: A 14-point review of systems was completed and was otherwise negative save what was reported in the HPI.    PAST MEDICAL/SURGICAL HISTORY:  Rectal cancer      SOCIAL HISTORY:  - TOBACCO: Denies  - ALCOHOL: Denies  - ILLICIT DRUG USE: Denies    FAMILY HISTORY:  No known history of gastrointestinal or liver disease;      HOME MEDICATIONS:  KlonoPIN 0.5 mg oral tablet: 1 tab(s) orally 3 times a day (28 Feb 2023 13:53)  ondansetron 8 mg oral tablet: 1 tab(s) orally every 8 hours, As Needed (28 Feb 2023 13:53)  potassium chloride 20 mEq oral tablet, extended release: 1 tab(s) orally 2 times a day (28 Feb 2023 13:53)  prochlorperazine 10 mg oral tablet: 1 tab(s) orally every 6 hours, As Needed (28 Feb 2023 13:53)  venlafaxine 75 mg oral capsule, extended release: 1 cap(s) orally once a day (28 Feb 2023 13:53)    INPATIENT MEDICATIONS:  MEDICATIONS  (STANDING):    MEDICATIONS  (PRN):    ALLERGIES:  No Known Allergies    T(C): --  HR: 106 (03-10-23 @ 14:29) (106 - 106)  BP: 106/75 (03-10-23 @ 14:29) (106/75 - 106/75)  RR: 18 (03-10-23 @ 14:29) (18 - 18)  SpO2: 100% (03-10-23 @ 14:29) (100% - 100%)      PHYSICAL EXAM:  Constitutional: frail  Eyes: Sclerae anicteric, conjunctivae normal  ENMT: Mucus membranes moist, no oropharyngeal thrush noted  Respiratory: Breathing nonlabored; clear to auscultation  Cardiovascular: Regular rate and rhythm  Gastrointestinal: severely distended, high pitched bowel sounds   Extremities: + edema  Neurological: Alert and oriented to person, place and time;  Skin: No jaundice  Musculoskeletal: No significant peripheral atrophy  Psychiatric: Affect and mood appropriate      LABS:                          IMAGING: I personally reviewed the XXXX, and I agree with the radiologist's interpretation as described below:   HISTORY OF PRESENT ILLNESS: This is a 57y old woman with a past medical history significant for rectal cancer diagnosed in August 2022 on systemic chemotherapy who was sent in from her oncologist office after a surveillance CT A/P noted a large bowel obstruction. She notes worsening abdominal distension for several weeks. She has ascites that has required previous paracentesis. She reports chronic diarrhea, initially attributed to her chemotherapy. She reports multiple loose bowel movements per day + flatus. She denies nausea, vomiting, fever or chills. She denies overt abdominal pain but reports discomfort due to the increased distension. She also endorses poor appetite and early satiety.    at bedside     ROS: A 14-point review of systems was completed and was otherwise negative save what was reported in the HPI.    PAST MEDICAL/SURGICAL HISTORY:  Rectal cancer      SOCIAL HISTORY:  - TOBACCO: Denies  - ALCOHOL: Denies  - ILLICIT DRUG USE: Denies    FAMILY HISTORY:  No known history of gastrointestinal or liver disease;      HOME MEDICATIONS:  KlonoPIN 0.5 mg oral tablet: 1 tab(s) orally 3 times a day (28 Feb 2023 13:53)  ondansetron 8 mg oral tablet: 1 tab(s) orally every 8 hours, As Needed (28 Feb 2023 13:53)  potassium chloride 20 mEq oral tablet, extended release: 1 tab(s) orally 2 times a day (28 Feb 2023 13:53)  prochlorperazine 10 mg oral tablet: 1 tab(s) orally every 6 hours, As Needed (28 Feb 2023 13:53)  venlafaxine 75 mg oral capsule, extended release: 1 cap(s) orally once a day (28 Feb 2023 13:53)    INPATIENT MEDICATIONS:  MEDICATIONS  (STANDING):    MEDICATIONS  (PRN):    ALLERGIES:  No Known Allergies    T(C): --  HR: 106 (03-10-23 @ 14:29) (106 - 106)  BP: 106/75 (03-10-23 @ 14:29) (106/75 - 106/75)  RR: 18 (03-10-23 @ 14:29) (18 - 18)  SpO2: 100% (03-10-23 @ 14:29) (100% - 100%)      PHYSICAL EXAM:  Constitutional: frail  Eyes: Sclerae anicteric, conjunctivae normal  ENMT: Mucus membranes moist, no oropharyngeal thrush noted  Respiratory: Breathing nonlabored; clear to auscultation  Cardiovascular: Regular rate and rhythm  Gastrointestinal: severely distended, high pitched bowel sounds   Extremities: + edema  Neurological: Alert and oriented to person, place and time;  Skin: No jaundice  Musculoskeletal: No significant peripheral atrophy  Psychiatric: Affect and mood appropriate      LABS:                          IMAGING: I personally reviewed the CTAP and I agree with the radiologist's interpretation as described below: rectal mass severely distended colon

## 2023-03-10 NOTE — H&P ADULT - NSHPPHYSICALEXAM_GEN_ALL_CORE
Vital Signs Last 24 Hrs  T(C): 36.4 (10 Mar 2023 20:06), Max: 36.4 (10 Mar 2023 20:06)  T(F): 97.6 (10 Mar 2023 20:06), Max: 97.6 (10 Mar 2023 20:06)  HR: 61 (10 Mar 2023 21:15) (61 - 106)  BP: 102/68 (10 Mar 2023 21:15) (93/63 - 106/75)  BP(mean): 73 (10 Mar 2023 20:06) (73 - 73)  RR: 18 (10 Mar 2023 20:06) (18 - 18)  SpO2: 96% (10 Mar 2023 20:06) (96% - 100%)    Parameters below as of 10 Mar 2023 20:06  Patient On (Oxygen Delivery Method): room air    General: NAD, resting comfortably  Neurology: A&Ox3, moves all extremities  Respiratory: nonlabored breathing, normal chest wall expansion  Abdominal: Tensely distended, non tender, tympanic to percussion  Vascular: Warm and well perfused  Extremities: No edema

## 2023-03-10 NOTE — ED ADULT NURSE NOTE - OBJECTIVE STATEMENT
Assumed care of patient in b10L. Pt a&ox4 rr even and unlabored with pmhx of rectal cancer stage IV on chemo (last chemo 2 weeks ago) presents to ed after call by oncologist with report of large bowel obstruction seen on maintenance CT scan today. + diarrhea for approximately a week. Pt reports paracentesis 3 weeks ago. Pt denies chest pain, sob, fever, chills, blood in stool, n/v. pt educated on plan of care, pt able to successfully teach back plan of care to RN, RN will continue to reeducate pt during hospital stay.

## 2023-03-10 NOTE — ED PROVIDER NOTE - CLINICAL SUMMARY MEDICAL DECISION MAKING FREE TEXT BOX
patient presenting with large bowel obstruction secondary to rectal mass on outpatient imaging.  Will obtain labs and  surgical consultation

## 2023-03-10 NOTE — H&P ADULT - ASSESSMENT
57F with stage IV rectal cancer on FOLFOX chemotherapy admitted for obstruction rectal mass.     - Admit to Colorectal surgery under Dr. Stratton  - NPO/IVF  - Low threshold for NGT placement  - GI consulted for possible stenting and decision made that best option for patient would be surgical diverting ostomy  - Added on for Monday  - Medicine optimization  - Plan discussed with Dr. Stratton

## 2023-03-11 NOTE — CONSULT NOTE ADULT - ASSESSMENT
57 F with Hx of stage IV rectal cancer on FOLFOX chemotherapy sent in when monitoring CT showed large bowel obstruction 2/2 rectal mass, she has been distended and bloated for some time and is usually treated with paracentesis when they take off 12L. She is still passing gas but does acknowledge it is less than usual, she usually has a few weeks of diarrhea after her chemo treatment, she has had them for a longer time since her last chemo treatment, she also has abdominal distension, she has felt less of an appetite in the ED she had imaging done showed Severe rectal wall-thickening with luminal narrowing is without significant change from 12/23/2022, however there is new severe large   bowel obstruction due to the mass, Large volume ascites, markedly increased, Stable right lower quadrant soft tissue nodule/metastatic adenopathy, Previously seen areas of peritoneal carcinomatosis are no longer discretely visualized.  patient is admitted under colorectal surgery team for further management and the plan from them is to do diverting ostomy on Monday, medicine team consulted for medical optimization.     Plan:     Large bowel Obstruction due to rectal mass:     IV fluids, would suggest changing to LR, patient is currently on half NS  Pain meds  NPO    57 F with Hx of Anxiety and stage IV rectal cancer on FOLFOX chemotherapy sent in when monitoring CT showed large bowel obstruction 2/2 rectal mass, she has been distended and bloated for some time and is usually treated with paracentesis when they take off 12L. She is still passing gas but does acknowledge it is less than usual, she usually has a few weeks of diarrhea after her chemo treatment, she has had them for a longer time since her last chemo treatment, she also has abdominal distension, she has felt less of an appetite in the ED she had imaging done showed Severe rectal wall-thickening with luminal narrowing is without significant change from 12/23/2022, however there is new severe large bowel obstruction due to the mass, Large volume ascites, markedly increased, Stable right lower quadrant soft tissue nodule/metastatic adenopathy, Previously seen areas of peritoneal carcinomatosis are no longer discretely visualized, patient is admitted under colorectal surgery team for further management and the plan from them is to do diverting ostomy on Monday, medicine team consulted for medical optimization, patient denies exertional chest pain or sob, her METS is >4, RCRI is 0, patient EKG, labs and imaging reviewed, patient is optimized from the medicine point of view for planed procedure, need to check CBC before she goes for the procedure to make sure Hb is >9 and her white count is not rising, might need to have paracentesis before procedure give large volume ascites.      Plan:     Large bowel Obstruction due to rectal mass:     IV fluids, would suggest changing to LR, patient is currently on half NS  Pain meds  NPO   Plan as per primary team    Large Ascites on CT scan: might need to have paracentesis before she goes for the procedure     Anxiety: continue with her home dose of Effexor and clonapin    Leucocytosis: No source of infection, CXR looks ok, might need to check UA, yesterday was 30k  now 15k, could be in setting of dehydration and hemo concentration, if spike fever will get cultures and start epimeric antibiotics.     Medicine team is signing off, please call as needed   Plan of care discussed with Colorectal team    57 F with Hx of Anxiety and stage IV rectal cancer on FOLFOX chemotherapy sent in when monitoring CT showed large bowel obstruction 2/2 rectal mass, she has been distended and bloated for some time and is usually treated with paracentesis when they take off 12L. She is still passing gas but does acknowledge it is less than usual, she usually has a few weeks of diarrhea after her chemo treatment, she has had them for a longer time since her last chemo treatment, she also has abdominal distension, she has felt less of an appetite in the ED she had imaging done showed Severe rectal wall-thickening with luminal narrowing is without significant change from 12/23/2022, however there is new severe large bowel obstruction due to the mass, Large volume ascites, markedly increased, Stable right lower quadrant soft tissue nodule/metastatic adenopathy, Previously seen areas of peritoneal carcinomatosis are no longer discretely visualized, patient is admitted under colorectal surgery team for further management and the plan from them is to do diverting ostomy on Monday, medicine team consulted for medical optimization, patient denies exertional chest pain or sob, her METS is >4, RCRI is 0, patient EKG, labs and imaging reviewed, patient is optimized from the medicine point of view for planed procedure, need to check CBC before she goes for the procedure to make sure Hb is >9 and her white count is not rising, might need to have paracentesis before procedure give large volume ascites.      Plan:     Large bowel Obstruction due to rectal mass:     IV fluids, would suggest changing to LR, patient is currently on half NS  Pain meds  NPO   Plan as per primary team    Large Ascites on CT scan: might need to have paracentesis before she goes for the procedure     Anxiety: continue with her home dose of Effexor and clonapin    Leucocytosis: No source of infection, CXR looks ok, might need to check UA, yesterday was 30k  now 15k, could be in setting of dehydration and hemo concentration, if spike fever will get cultures and start epimeric antibiotics.     Thrombocytopenia: Plate count is 63, would monitor CBC, and give single donor platelets before procedure depends upon surgeon comfort zone for platelet count, generally above 50k is ok to proceed for any major surgery except neuro or occular surgeries.     Medicine team is signing off, please call as needed   Plan of care discussed with Colorectal team.

## 2023-03-11 NOTE — PROGRESS NOTE ADULT - ASSESSMENT
57F with stage IV rectal cancer on FOLFOX chemotherapy admitted for obstruction rectal mass. GI consulted for possible stenting and decision made that best option for patient would be surgical diverting ostomy    - NPO/IVF  - Low threshold for NGT placement  - Added on for Monday  - Medicine optimization

## 2023-03-11 NOTE — PROGRESS NOTE ADULT - SUBJECTIVE AND OBJECTIVE BOX
COLORECTAL SURGERY PROGRESS NOTE    Subjective: Patient examined at bedside this AM. Reports she is feeling okay overall but has some discomfort from the distension. No N/V. Continues to pass some gas and have liquid stools. No acute events overnight    Objective:  Vital Signs  T(C): 36.8 (03-11 @ 04:44), Max: 36.8 (03-11 @ 04:44)  HR: 73 (03-11 @ 04:44) (61 - 106)  BP: 100/67 (03-11 @ 04:44) (93/63 - 106/75)  RR: 16 (03-11 @ 04:44) (16 - 18)  SpO2: 97% (03-11 @ 04:44) (96% - 100%)    Physical Exam:  General: alert and oriented, NAD  Resp: airway patent, respirations unlabored  CVS: regular rate and rhythm  Abdomen: tensely distended, non tender, no rebound or guarding  Extremities: no edema  Skin: warm, dry, appropriate color    Labs:                        11.7   31.50 )-----------( 76       ( 10 Mar 2023 16:08 )             35.8   03-10    134<L>  |  101  |  14.5  ----------------------------<  113<H>  4.7   |  26.0  |  0.60    Ca    9.7      10 Mar 2023 16:08    TPro  6.5<L>  /  Alb  4.0  /  TBili  0.6  /  DBili  x   /  AST  36<H>  /  ALT  19  /  AlkPhos  223<H>  03-10

## 2023-03-11 NOTE — CONSULT NOTE ADULT - SUBJECTIVE AND OBJECTIVE BOX
57 F with Hx of stage IV rectal cancer on FOLFOX chemotherapy sent in when monitoring CT showed large bowel obstruction 2/2 rectal mass, she has been distended and bloated for some time and is usually treated with paracentesis when they take off 12L. She is still passing gas but does acknowledge it is less than usual, she usually has a few weeks of diarrhea after her chemo treatment, she has had them for a longer time since her last chemo treatment, she also has abdominal distension, she has felt less of an appetite in the ED she had imaging done showed Severe rectal wall-thickening with luminal narrowing is without significant change from 12/23/2022, however there is new severe large   bowel obstruction due to the mass, Large volume ascites, markedly increased, Stable right lower quadrant soft tissue nodule/metastatic adenopathy, Previously seen areas of peritoneal carcinomatosis are no longer discretely visualized.  patient is admitted under colorectal surgery team for further management and the plan from them is to do diverting ostomy on Monday, medicine team consulted for medical optimization, patient has no nausea or vomiting, denies chest pain or sob, has no fever, chills.       REVIEW OF SYSTEMS:    CONSTITUTIONAL: No fever, some fatigue  RESPIRATORY: No cough, No shortness of breath  CARDIOVASCULAR: No chest pain, palpitations  GASTROINTESTINAL: No abdominal, No nausea, vomiting  NEUROLOGICAL: No headaches,  loss of strength.  MISCELLANEOUS: No joint swelling or pain     Allergies:  	No Known Allergies:     Home Medications:   * Patient Currently Takes Medications as of 28-Feb-2023 13:53 documented in Structured Notes  · 	venlafaxine 75 mg oral capsule, extended release: 1 cap(s) orally once a day  · 	ondansetron 8 mg oral tablet: 1 tab(s) orally every 8 hours, As Needed  · 	prochlorperazine 10 mg oral tablet: 1 tab(s) orally every 6 hours, As Needed  · 	potassium chloride 20 mEq oral tablet, extended release: 1 tab(s) orally 2 times a day  · 	KlonoPIN 0.5 mg oral tablet: 1 tab(s) orally 3 times a day      PAST MEDICAL HISTORY:  Rectal cancer.     Social History: Not a smoker, drinker or using any drugs         Vital Signs Last 24 Hrs  T(C): 36.9 (11 Mar 2023 09:30), Max: 36.9 (11 Mar 2023 07:31)  T(F): 98.5 (11 Mar 2023 09:30), Max: 98.5 (11 Mar 2023 07:31)  HR: 78 (11 Mar 2023 09:30) (61 - 106)  BP: 100/66 (11 Mar 2023 09:30) (93/63 - 106/75)  BP(mean): 77 (11 Mar 2023 09:30) (73 - 77)  RR: 18 (11 Mar 2023 09:30) (16 - 18)  SpO2: 98% (11 Mar 2023 09:30) (96% - 100%)    Parameters below as of 11 Mar 2023 09:30  Patient On (Oxygen Delivery Method): room air        PHYSICAL EXAM:    GENERAL: Middle age female looking comfortable   HEENT: PERRL, +EOMI  NECK: soft, Supple, No JVD,   CHEST/LUNG: Clear to auscultate bilaterally; No wheezing  HEART: S1S2+, Regular rate and rhythm; No murmurs  ABDOMEN: Soft, Nontender, distended; Bowel sounds present  EXTREMITIES:  1+ Peripheral Pulses, No edema  SKIN: No rashes or lesions  NEURO: AAOX3  PSYCH: normal mood      LABS:                        10.1   15.01 )-----------( 63       ( 11 Mar 2023 06:25 )             31.9     03-11    136  |  103  |  12.5  ----------------------------<  80  3.8   |  24.0  |  0.60    Ca    8.5      11 Mar 2023 06:25  Phos  3.6     03-11  Mg     2.1     03-11    TPro  5.4<L>  /  Alb  3.2<L>  /  TBili  0.4  /  DBili  x   /  AST  26  /  ALT  15  /  AlkPhos  165<H>  03-11    PT/INR - ( 11 Mar 2023 06:25 )   PT: 12.5 sec;   INR: 1.08 ratio         PTT - ( 11 Mar 2023 06:25 )  PTT:28.3 sec        I&O's Summary      MEDICATIONS  (STANDING):  acetaminophen   IVPB .. 1000 milliGRAM(s) IV Intermittent every 6 hours  enoxaparin Injectable 40 milliGRAM(s) SubCutaneous every 24 hours  sodium chloride 0.45% with potassium chloride 20 mEq/L 1000 milliLiter(s) (100 mL/Hr) IV Continuous <Continuous>  venlafaxine XR. 75 milliGRAM(s) Oral every 24 hours    MEDICATIONS  (PRN):  ketorolac   Injectable 15 milliGRAM(s) IV Push every 6 hours PRN Moderate Pain (4 - 6)           57 F with Hx of Anxiety and stage IV rectal cancer on FOLFOX chemotherapy sent in when monitoring CT showed large bowel obstruction 2/2 rectal mass, she has been distended and bloated for some time and is usually treated with paracentesis when they take off 12L. She is still passing gas but does acknowledge it is less than usual, she usually has a few weeks of diarrhea after her chemo treatment, she has had them for a longer time since her last chemo treatment, she also has abdominal distension, she has felt less of an appetite in the ED she had imaging done showed Severe rectal wall-thickening with luminal narrowing is without significant change from 12/23/2022, however there is new severe large   bowel obstruction due to the mass, Large volume ascites, markedly increased, Stable right lower quadrant soft tissue nodule/metastatic adenopathy, Previously seen areas of peritoneal carcinomatosis are no longer discretely visualized.  patient is admitted under colorectal surgery team for further management and the plan from them is to do diverting ostomy on Monday, medicine team consulted for medical optimization, patient has no nausea or vomiting, denies chest pain or sob, has no fever, chills.       REVIEW OF SYSTEMS:    CONSTITUTIONAL: No fever, some fatigue  RESPIRATORY: No cough, No shortness of breath  CARDIOVASCULAR: No chest pain, palpitations  GASTROINTESTINAL: No abdominal, No nausea, vomiting  NEUROLOGICAL: No headaches,  loss of strength.  MISCELLANEOUS: No joint swelling or pain     Allergies:  	No Known Allergies:     Home Medications:   * Patient Currently Takes Medications as of 28-Feb-2023 13:53 documented in Structured Notes  · 	venlafaxine 75 mg oral capsule, extended release: 1 cap(s) orally once a day  · 	ondansetron 8 mg oral tablet: 1 tab(s) orally every 8 hours, As Needed  · 	prochlorperazine 10 mg oral tablet: 1 tab(s) orally every 6 hours, As Needed  · 	potassium chloride 20 mEq oral tablet, extended release: 1 tab(s) orally 2 times a day  · 	KlonoPIN 0.5 mg oral tablet: 1 tab(s) orally 3 times a day      PAST MEDICAL HISTORY:  Rectal cancer.     Social History: Not a smoker, drinker or using any drugs         Vital Signs Last 24 Hrs  T(C): 36.9 (11 Mar 2023 09:30), Max: 36.9 (11 Mar 2023 07:31)  T(F): 98.5 (11 Mar 2023 09:30), Max: 98.5 (11 Mar 2023 07:31)  HR: 78 (11 Mar 2023 09:30) (61 - 106)  BP: 100/66 (11 Mar 2023 09:30) (93/63 - 106/75)  BP(mean): 77 (11 Mar 2023 09:30) (73 - 77)  RR: 18 (11 Mar 2023 09:30) (16 - 18)  SpO2: 98% (11 Mar 2023 09:30) (96% - 100%)    Parameters below as of 11 Mar 2023 09:30  Patient On (Oxygen Delivery Method): room air        PHYSICAL EXAM:    GENERAL: Middle age female looking comfortable   HEENT: PERRL, +EOMI  NECK: soft, Supple, No JVD,   CHEST/LUNG: Clear to auscultate bilaterally; No wheezing  HEART: S1S2+, Regular rate and rhythm; No murmurs  ABDOMEN: Nontender, very distended; Bowel sounds present  EXTREMITIES:  1+ Peripheral Pulses, No edema  SKIN: No rashes or lesions  NEURO: AAOX3  PSYCH: normal mood      LABS:                        10.1   15.01 )-----------( 63       ( 11 Mar 2023 06:25 )             31.9     03-11    136  |  103  |  12.5  ----------------------------<  80  3.8   |  24.0  |  0.60    Ca    8.5      11 Mar 2023 06:25  Phos  3.6     03-11  Mg     2.1     03-11    TPro  5.4<L>  /  Alb  3.2<L>  /  TBili  0.4  /  DBili  x   /  AST  26  /  ALT  15  /  AlkPhos  165<H>  03-11    PT/INR - ( 11 Mar 2023 06:25 )   PT: 12.5 sec;   INR: 1.08 ratio         PTT - ( 11 Mar 2023 06:25 )  PTT:28.3 sec        I&O's Summary      MEDICATIONS  (STANDING):  acetaminophen   IVPB .. 1000 milliGRAM(s) IV Intermittent every 6 hours  enoxaparin Injectable 40 milliGRAM(s) SubCutaneous every 24 hours  sodium chloride 0.45% with potassium chloride 20 mEq/L 1000 milliLiter(s) (100 mL/Hr) IV Continuous <Continuous>  venlafaxine XR. 75 milliGRAM(s) Oral every 24 hours    MEDICATIONS  (PRN):  ketorolac   Injectable 15 milliGRAM(s) IV Push every 6 hours PRN Moderate Pain (4 - 6)

## 2023-03-12 NOTE — PROGRESS NOTE ADULT - SUBJECTIVE AND OBJECTIVE BOX
Subjective: Patient seen and examined at bedside. VSSLuis KILLIAN. Denies abdominal pain, nausea, vomiting. Continues to have diarrhea       MEDICATIONS  (STANDING):  acetaminophen   IVPB .. 1000 milliGRAM(s) IV Intermittent every 6 hours  clonazePAM  Tablet 0.5 milliGRAM(s) Oral three times a day  enoxaparin Injectable 40 milliGRAM(s) SubCutaneous every 24 hours  lactated ringers. 1000 milliLiter(s) (100 mL/Hr) IV Continuous <Continuous>  venlafaxine XR. 75 milliGRAM(s) Oral every 24 hours    MEDICATIONS  (PRN):  ketorolac   Injectable 15 milliGRAM(s) IV Push every 6 hours PRN Moderate Pain (4 - 6)  ondansetron Injectable 4 milliGRAM(s) IV Push every 4 hours PRN Nausea and/or Vomiting      No Known Allergies        Objective:     ICU Vital Signs Last 24 Hrs  T(C): 36.8 (12 Mar 2023 01:30), Max: 36.9 (11 Mar 2023 07:31)  T(F): 98.3 (12 Mar 2023 01:30), Max: 98.5 (11 Mar 2023 07:31)  HR: 81 (12 Mar 2023 01:30) (61 - 81)  BP: 135/82 (12 Mar 2023 01:30) (93/65 - 135/82)  BP(mean): 77 (11 Mar 2023 09:30) (77 - 77)  ABP: --  ABP(mean): --  RR: 18 (12 Mar 2023 01:30) (16 - 18)  SpO2: 100% (12 Mar 2023 01:30) (91% - 100%)    O2 Parameters below as of 12 Mar 2023 01:30  Patient On (Oxygen Delivery Method): room air            I&O's Detail      Physical Exam:  General: NAD. Lying comfortably in bed.   HEENT: NCAT. Neck supple. EOMI.   Respiratory: Non labored breathing.   Cardio: RRR  Abdomen: Soft, non-tender, distended. No guarding or rebound.   Extremities: No clubbing or cyanosis.   Neuro: A&O x 3. CNs II-XII grossly intact.                           10.1   15.01 )-----------( 63       ( 11 Mar 2023 06:25 )             31.9       03-11    136  |  103  |  12.5  ----------------------------<  80  3.8   |  24.0  |  0.60    Ca    8.5      11 Mar 2023 06:25  Phos  3.6     03-11  Mg     2.1     03-11    TPro  5.4<L>  /  Alb  3.2<L>  /  TBili  0.4  /  DBili  x   /  AST  26  /  ALT  15  /  AlkPhos  165<H>  03-11      LIVER FUNCTIONS - ( 11 Mar 2023 06:25 )  Alb: 3.2 g/dL / Pro: 5.4 g/dL / ALK PHOS: 165 U/L / ALT: 15 U/L / AST: 26 U/L / GGT: x

## 2023-03-12 NOTE — PROGRESS NOTE ADULT - TIME BILLING
Patient seen and examined, chart reviewed.  No acute issues overnight, continues to have bowel function.  Denies any further questions regarding surgery tomorrow.  Anesthesia evaluated patient and requested paracentesis prior to OR.  Exam distended, tympanic, but nontender and no rebound/guarding.    -- Preop for OR tomorrow  -- No bowel prep given pLBO  -- NPO, IV fluids - D5 1/2NS with K  -- IR consult for paracentesis prior to OR.  Patient is currently on add-on list for OR tomorrow.  -- Serial exams  -- DVT prophylaxis  -- Stoma marking - requested, though with her distention there will be difficulties appreciating the best site/her disease may limit the mobilization of her colon to the most ideal site.

## 2023-03-12 NOTE — PROGRESS NOTE ADULT - SUBJECTIVE AND OBJECTIVE BOX
BLESSING SANTO    [id number]    57y      Female    Patient is a 57y old  Female who presents with a chief complaint of obstructing rectal mass (12 Mar 2023 01:58)      INTERVAL HPI/OVERNIGHT EVENTS:    patient is feeling ok, denies nausea, vomiting, abdominal pain, has no fever, chills, chest pain    REVIEW OF SYSTEMS:    CONSTITUTIONAL: No fever, some fatigue  RESPIRATORY: No cough, No shortness of breath  CARDIOVASCULAR: No chest pain, palpitations  GASTROINTESTINAL: abdominal is distention, No nausea, vomiting        Vital Signs Last 24 Hrs  T(C): 36.8 (12 Mar 2023 01:30), Max: 36.8 (12 Mar 2023 01:30)  T(F): 98.3 (12 Mar 2023 01:30), Max: 98.3 (12 Mar 2023 01:30)  HR: 81 (12 Mar 2023 01:30) (61 - 81)  BP: 135/82 (12 Mar 2023 01:30) (93/65 - 135/82)  RR: 18 (12 Mar 2023 01:30) (18 - 18)  SpO2: 100% (12 Mar 2023 01:30) (91% - 100%)    Parameters below as of 12 Mar 2023 01:30  Patient On (Oxygen Delivery Method): room air        PHYSICAL EXAM:    GENERAL: Middle age female looking comfortable but little drowsy   HEENT: PERRL, +EOMI  NECK: soft, Supple, No JVD,   CHEST/LUNG: Clear to auscultate bilaterally; No wheezing  HEART: S1S2+, Regular rate and rhythm; No murmurs  ABDOMEN: Nontender, very distended; Bowel sounds present  EXTREMITIES:  1+ Peripheral Pulses, No edema  SKIN: No rashes or lesions  NEURO: AAOX3  PSYCH: normal mood      LABS:                        11.5   15.33 )-----------( 57       ( 12 Mar 2023 05:16 )             36.2     03-12    137  |  105  |  11.7  ----------------------------<  69<L>  3.2<L>   |  22.0  |  0.51    Ca    8.6      12 Mar 2023 05:16  Phos  3.8     03-12  Mg     1.9     03-12    TPro  4.9<L>  /  Alb  3.0<L>  /  TBili  0.4  /  DBili  0.1  /  AST  23  /  ALT  12  /  AlkPhos  131<H>  03-12    PT/INR - ( 11 Mar 2023 06:25 )   PT: 12.5 sec;   INR: 1.08 ratio         PTT - ( 11 Mar 2023 06:25 )  PTT:28.3 sec        I&O's Summary      MEDICATIONS  (STANDING):  clonazePAM  Tablet 0.5 milliGRAM(s) Oral three times a day  dextrose 5% + lactated ringers 1000 milliLiter(s) (100 mL/Hr) IV Continuous <Continuous>  enoxaparin Injectable 40 milliGRAM(s) SubCutaneous every 24 hours  potassium chloride  10 mEq/100 mL IVPB 10 milliEquivalent(s) IV Intermittent every 1 hour  venlafaxine XR. 75 milliGRAM(s) Oral every 24 hours    MEDICATIONS  (PRN):  ketorolac   Injectable 15 milliGRAM(s) IV Push every 6 hours PRN Moderate Pain (4 - 6)  ondansetron Injectable 4 milliGRAM(s) IV Push every 4 hours PRN Nausea and/or Vomiting

## 2023-03-12 NOTE — PROGRESS NOTE ADULT - ASSESSMENT
57 F with Hx of Anxiety and stage IV rectal cancer on FOLFOX chemotherapy sent in when monitoring CT showed large bowel obstruction 2/2 rectal mass, she has been distended and bloated for some time and is usually treated with paracentesis when they take off 12L. She is still passing gas but does acknowledge it is less than usual, she usually has a few weeks of diarrhea after her chemo treatment, she has had them for a longer time since her last chemo treatment, she also has abdominal distension, she has felt less of an appetite in the ED she had imaging done showed Severe rectal wall-thickening with luminal narrowing is without significant change from 12/23/2022, however there is new severe large bowel obstruction due to the mass, Large volume ascites, markedly increased, Stable right lower quadrant soft tissue nodule/metastatic adenopathy, Previously seen areas of peritoneal carcinomatosis are no longer discretely visualized, patient is admitted under colorectal surgery team for further management and the plan from them is to do diverting ostomy on Monday, medicine team consulted for medical optimization, patient denies exertional chest pain or sob, her METS is >4, RCRI is 0, patient EKG, labs and imaging reviewed, patient is optimized from the medicine point of view for planed procedure, need to check CBC before she goes for the procedure to make sure Hb is >9 and her white count is not rising, might need to have paracentesis before procedure give large volume ascites.      Plan:     Large bowel Obstruction due to rectal mass:     IV fluids  Pain meds  NPO   Plan as per primary team    Large Ascites on CT scan: might need to have paracentesis before she goes for the procedure     Anxiety: continue with her home dose of Effexor and clonazepam, hold clonazepam is drowsy or sleepy or would make it PRN as she looks drowsy.     Leucocytosis: No source of infection, CXR looks ok, might need to check UA, yesterday was 30k  now 15k, could be in setting of dehydration and hemo concentration, if spike fever will get cultures and start epimeric antibiotics.     Thrombocytopenia: Plate count was 63 now 57, would monitor CBC, and give single donor platelets before procedure depends upon surgeon comfort zone for platelet count, generally above 50k is ok to proceed for any major surgery except neuro or occular surgeries, depends upon surgeon preference.     Hypokalemia: Being replaced.     Plan of care discussed with Colorectal team.

## 2023-03-12 NOTE — PROGRESS NOTE ADULT - ASSESSMENT
57F with stage IV rectal cancer on FOLFOX chemotherapy admitted for obstruction rectal mass. GI consulted for possible stenting and decision made that best option for patient would be surgical diverting ostomy    - NPO/IVF  - Low threshold for NGT placement  - Added on for Monday  - Medicine cleared  - F/u IR for paracentesis   - Palliative consult

## 2023-03-13 NOTE — BRIEF OPERATIVE NOTE - NSICDXBRIEFPREOP_GEN_ALL_CORE_FT
PRE-OP DIAGNOSIS:  Ascites 13-Mar-2023 11:54:52  Santos Rubio  
PRE-OP DIAGNOSIS:  Large bowel obstruction 13-Mar-2023 18:41:34  Autumn Saavedra

## 2023-03-13 NOTE — PROGRESS NOTE ADULT - ASSESSMENT
57 F with Hx of Anxiety and stage IV rectal cancer on FOLFOX chemotherapy sent in when monitoring CT showed large bowel obstruction 2/2 rectal mass, she has been distended and bloated for some time and is usually treated with paracentesis when they take off 12L. She is still passing gas but does acknowledge it is less than usual, she usually has a few weeks of diarrhea after her chemo treatment, she has had them for a longer time since her last chemo treatment, she also has abdominal distension, she has felt less of an appetite in the ED she had imaging done showed Severe rectal wall-thickening with luminal narrowing is without significant change from 12/23/2022, however there is new severe large bowel obstruction due to the mass, Large volume ascites, markedly increased, Stable right lower quadrant soft tissue nodule/metastatic adenopathy, Previously seen areas of peritoneal carcinomatosis are no longer discretely visualized, patient is admitted under colorectal surgery team for further management and the plan from them is to do diverting ostomy on Monday, medicine team consulted for medical optimization, patient denies exertional chest pain or sob, her METS is >4, RCRI is 0, patient EKG, labs and imaging reviewed, patient is optimized from the medicine point of view for planed procedure, need to check CBC before she goes for the procedure to make sure Hb is >9 and her white count is not rising.     Plan:     Large bowel Obstruction due to rectal mass:     IV fluids  Pain meds  NPO   Plan as per primary team    Large Ascites on CT scan: s/p paracentesis of 3 liters of  fluid.     Anxiety: continue with her home dose of Effexor and clonazepam, hold clonazepam is drowsy or sleepy or would make it PRN as she looks drowsy.     Leucocytosis: No source of infection, CXR looks ok, might need to check UA, CBC showed was 30k >>15k>>>12.98, could be in setting of dehydration and hemo concentration, if spike fever will get cultures and start epimeric antibiotics.     Thrombocytopenia: Plate count is 63, would monitor CBC, and give single donor platelets before procedure depends upon surgeon comfort zone for platelet count, generally above 50k is ok to proceed for any major surgery except neuro or occular surgeries, depends upon surgeon preference.     Hypokalemia: Being replaced.     Medicine team is signing off, please call as needed

## 2023-03-13 NOTE — PROGRESS NOTE ADULT - ASSESSMENT
57F with stage IV rectal cancer on FOLFOX chemotherapy admitted for obstruction rectal mass. GI consulted for possible stenting and decision made that best option for patient would be surgical intervention. Plan for OR today.    - NPO/IVF (d5 1/2LR w/ K)  - no bowel prep given LBO  - OR today  - Medicine cleared  - f/u IR for paracentesis this AM  - Palliative consult

## 2023-03-13 NOTE — PROGRESS NOTE ADULT - SUBJECTIVE AND OBJECTIVE BOX
INTERVAL HPI/OVERNIGHT EVENTS:    Patient evaluated at bedside. NAOE. Patient denies N/V/CP/SOB, no subjective fevers or chills. Pain well controlled. Plan for OR today, will receive paracentesis with IR prior to OR.    MEDICATIONS  (STANDING):  dextrose 5% + lactated ringers 1000 milliLiter(s) (100 mL/Hr) IV Continuous <Continuous>  venlafaxine XR. 75 milliGRAM(s) Oral every 24 hours    MEDICATIONS  (PRN):  ketorolac   Injectable 15 milliGRAM(s) IV Push every 6 hours PRN Moderate Pain (4 - 6)  ondansetron Injectable 4 milliGRAM(s) IV Push every 4 hours PRN Nausea and/or Vomiting      Vital Signs Last 24 Hrs  T(C): 36.4 (13 Mar 2023 00:30), Max: 36.9 (12 Mar 2023 13:30)  T(F): 97.5 (13 Mar 2023 00:30), Max: 98.4 (12 Mar 2023 13:30)  HR: 72 (13 Mar 2023 00:30) (65 - 78)  BP: 127/87 (13 Mar 2023 00:30) (107/80 - 127/87)  BP(mean): --  RR: 18 (13 Mar 2023 00:30) (18 - 18)  SpO2: 95% (13 Mar 2023 00:30) (91% - 95%)    Parameters below as of 13 Mar 2023 00:30  Patient On (Oxygen Delivery Method): room air      Physical Exam:  General: NAD. Lying comfortably in bed.  HEENT: NCAT. Neck supple. EOMI.  Respiratory: respirations even, unlabored on room air  Cardio: RRR  Abdomen: grossly distended, non-tender  Extremities: No clubbing or cyanosis.  Neuro: A&O x 3. CNs II-XII grossly intact.      I&O's Detail      LABS:                        11.5   15.33 )-----------( 57       ( 12 Mar 2023 05:16 )             36.2     03-12    133<L>  |  101  |  10.5  ----------------------------<  82  3.5   |  23.0  |  0.51    Ca    8.3<L>      12 Mar 2023 16:25  Phos  3.8     03-12  Mg     1.9     03-12    TPro  4.9<L>  /  Alb  3.0<L>  /  TBili  0.4  /  DBili  0.1  /  AST  23  /  ALT  12  /  AlkPhos  131<H>  03-12    PT/INR - ( 11 Mar 2023 06:25 )   PT: 12.5 sec;   INR: 1.08 ratio         PTT - ( 11 Mar 2023 06:25 )  PTT:28.3 sec

## 2023-03-13 NOTE — ADVANCED PRACTICE NURSE CONSULT - ASSESSMENT
WOCN consulted for pre op stoma marking for loop colostomy.  Patient going to OR in AM.  WOCN introduced to patient and made aware of purpose.  Patient A&Ox3, agreeable to stoma marking. Patient with distended, firm abdomin.  Patient marked to the LLQ superior/ lateral to umbilicus and secured with Tegaderm.

## 2023-03-13 NOTE — PROGRESS NOTE ADULT - SUBJECTIVE AND OBJECTIVE BOX
BLESSING SANTO    [id number]    57y      Female    Patient is a 57y old  Female who presents with a chief complaint of obstructing rectal mass (13 Mar 2023 09:07)      INTERVAL HPI/OVERNIGHT EVENTS:    patient is feeling ok, has no complain, denies nausea, vomiting, abdominal pain, has no fever, chills, chest pain, s/p paracentesis of 3 liters of fluid      REVIEW OF SYSTEMS:    CONSTITUTIONAL: No fever, some fatigue  RESPIRATORY: No cough, No shortness of breath  CARDIOVASCULAR: No chest pain, palpitations  GASTROINTESTINAL: abdominal is distention, No nausea, vomiting      Vital Signs Last 24 Hrs  T(C): 37 (13 Mar 2023 14:41), Max: 37.1 (13 Mar 2023 11:41)  T(F): 98.6 (13 Mar 2023 14:41), Max: 98.7 (13 Mar 2023 11:41)  HR: 79 (13 Mar 2023 14:41) (69 - 81)  BP: 107/78 (13 Mar 2023 14:41) (107/78 - 130/89)  RR: 16 (13 Mar 2023 14:41) (16 - 18)  SpO2: 97% (13 Mar 2023 14:41) (91% - 98%)    Parameters below as of 13 Mar 2023 11:41  Patient On (Oxygen Delivery Method): room air        PHYSICAL EXAM:    GENERAL: Middle age female looking comfortable but little drowsy   HEENT: PERRL, +EOMI  NECK: soft, Supple, No JVD   CHEST/LUNG: Clear to auscultate bilaterally; No wheezing  HEART: S1S2+, Regular rate and rhythm; No murmurs  ABDOMEN: Nontender, very distended; Bowel sounds present  EXTREMITIES:  1+ Peripheral Pulses, No edema  SKIN: No rashes or lesions  NEURO: AAOX3  PSYCH: normal mood      LABS:                        12.3   12.98 )-----------( 63       ( 13 Mar 2023 03:47 )             36.9     03-13    138  |  105  |  9.0  ----------------------------<  96  3.6   |  24.0  |  0.49<L>    Ca    8.5      13 Mar 2023 12:00  Phos  3.2     03-13  Mg     2.0     03-13    TPro  4.9<L>  /  Alb  3.0<L>  /  TBili  0.4  /  DBili  0.1  /  AST  23  /  ALT  12  /  AlkPhos  131<H>  03-12            I&O's Summary    12 Mar 2023 07:01  -  13 Mar 2023 07:00  --------------------------------------------------------  IN: 1200 mL / OUT: 0 mL / NET: 1200 mL        MEDICATIONS  (STANDING):  cefoTEtan  IVPB 2 Gram(s) IV Intermittent once  dextrose 5% + lactated ringers 1000 milliLiter(s) (100 mL/Hr) IV Continuous <Continuous>  potassium chloride  10 mEq/100 mL IVPB 10 milliEquivalent(s) IV Intermittent every 1 hour  venlafaxine XR. 75 milliGRAM(s) Oral every 24 hours    MEDICATIONS  (PRN):  clonazePAM  Tablet 0.5 milliGRAM(s) Oral every 8 hours PRN anxiety  ketorolac   Injectable 15 milliGRAM(s) IV Push every 6 hours PRN Moderate Pain (4 - 6)  ondansetron Injectable 4 milliGRAM(s) IV Push every 4 hours PRN Nausea and/or Vomiting

## 2023-03-13 NOTE — BRIEF OPERATIVE NOTE - NSICDXBRIEFPROCEDURE_GEN_ALL_CORE_FT
PROCEDURES:  US guided paracentesis 13-Mar-2023 11:54:42  Santos Rubio  
PROCEDURES:  Creation of diverting colostomy 13-Mar-2023 18:40:53  Autumn Saavedra

## 2023-03-13 NOTE — BRIEF OPERATIVE NOTE - OPERATION/FINDINGS
6 fr safety centesis cathter RUQ.  3100 cc clear yellow fluid drained.
Abdomen carefully entered. Distended bowel encountered immediately. Large volume (approx 1200mL) ascites suctioned out. Bowel entered and combination of stool but predominately gas suctioned out. Diverting colostomy created.

## 2023-03-13 NOTE — CONSULT NOTE ADULT - ASSESSMENT
56yo F w/ PMH of stage IV rectal CA dx'd in 8/2022 on FOLFOX/bevcizumab who underwent f/u CT which revealed colonic obstruxn 2/2 rectal mass who p/w diarrhea and abdominal distention.  Pt has recurrent ascites for which she has undergone prior large volume paracenteses.  Pt was seen by GI to eval for possible colonic stent but they felt high risk for perforation and recommend colorectal surgery who now plan on colectomy with diverting colostomy (with pre-op paracentesis). We are consulted for assistance with goals of care.  #Goals of care  - Surrogate/HCP/Guardian: Phone#: spouse Lenny Cruz - [place] [phone]  [home  [phone]]  - re: CRC, I reviewed most recent oncology note - Dr Calabrese - in addition to rectal mass, w/u revealed malignant ascites, cirrhotic liver (not mets noted), omental caking and peritoneal carcinomatosis.  Plan was to stop oxaliplatin and, if POD on f/u imaging, to change to FOLFIRI.        - reviewed ISTOP - Reference #: [id number] - pt Rx'd oxy IR 5mg tabs and clonazepam 0.5mg tabs 58yo F w/ PMH of stage IV rectal CA dx'd in 8/2022 on FOLFOX/bevcizumab who underwent f/u CT which revealed colonic obstruxn 2/2 rectal mass who p/w diarrhea and abdominal distention.  Pt has recurrent ascites for which she has undergone prior large volume paracenteses.  Pt was seen by GI to eval for possible colonic stent but they felt high risk for perforation and recommend colorectal surgery who now plan on colectomy with diverting colostomy (with pre-op paracentesis). We are consulted for assistance with goals of care.  #Goals of care  - Surrogate/HCP/Guardian: Phone#: spouse Lenny Cruz - [place] [phone]  [home  [phone]]  - re: CRC, I reviewed most recent oncology note - Dr Calabrese - in addition to rectal mass, w/u revealed malignant ascites, cirrhotic liver (not mets noted), omental caking and peritoneal carcinomatosis.  Plan was to stop oxaliplatin and, if POD on f/u imaging, to change to FOLFIRI.      Multiple attempts made to see pt today but was in OR - will f/u tomorrow and complete consultation then    - reviewed ISTOP - Reference #: [id number] - pt Rx'd oxy IR 5mg tabs and clonazepam 0.5mg tabs 56yo F w/ PMH of stage IV rectal CA dx'd in 8/2022 on FOLFOX who underwent f/u CT which revealed colonic obstruxn 2/2 rectal mass who p/w diarrhea and abdominal distention.  Pt has recurrent ascites for which she has undergone prior large volume paracenteses.  Pt was seen by GI to eval for possible colonic stent but they felt high risk for perforation and recommend colorectal surgery who now plan on colectomy with diverting colostomy (with pre-op paracentesis). Pt underwent both interventions 3/13 without complication. We are consulted for assistance with goals of care.  #Goals of care  - Surrogate/HCP/Guardian: Phone#: spouse Lenny Cruz - [place] [phone]  [home  [phone]]  - re: CRC, I reviewed most recent oncology note - Dr Calabrese - in addition to rectal mass, w/u revealed malignant ascites, cirrhotic liver (not mets noted), omental caking and peritoneal carcinomatosis.  Plan was to stop oxaliplatin and, if POD on f/u imaging, to change to FOLFIRI.        - reviewed ISTOP - Reference #: [id number] - pt Rx'd oxy IR 5mg tabs and clonazepam 0.5mg tabs

## 2023-03-13 NOTE — CONSULT NOTE ADULT - SUBJECTIVE AND OBJECTIVE BOX
HPI:  58yo F w/ PMH of stage IV rectal CA dx'd in 8/2022 on FOLFOX who underwent f/u CT which revealed colonic obstruxn 2/2 rectal mass who p/w diarrhea and abdominal distention.  Pt has recurrent ascites for which she has undergone prior large volume paracenteses.  Pt was seen by GI to eval for possible colonic stent but they felt high risk for perforation and recommend colorectal surgery who now plan on colectomy with diverting colostomy (with pre-op paracentesis). We are consulted for assistance with goals of care.      PERTINENT PMH REVIEWED: Yes    PAST MEDICAL & SURGICAL HISTORY:  as above    SOCIAL HISTORY:                                            Surrogate/HCP/Guardian: Phone#: spouse Lenny Cruz - [place] [phone]  [home  [phone]]    FAMILY HISTORY:  reviewed and noncontributory    Allergies  No Known Allergies    ADVANCE DIRECTIVES/TREATMENT PREFERENCES:  Full code, all aggressive measures desired   DNR/DNI - MOLST, continue all other medical treatments  DNR/DNI - MOLST, comfort measures only     Baseline ADLs (prior to admission):  Independent  Dependent      Karnofsky/Palliative Performance Status Version 2:  %  http://npcrc.org/files/news/palliative_performance_scale_ppsv2.pdf    PRESENT SYMPTOMS:     Dyspnea: Yes No  Nausea/Vomiting: Yes No  Anxiety:  Yes No  Depression: Yes No  Fatigue: Yes No  Loss of appetite: Yes No  Constipation: Yes No    PAIN:              Character-            Duration-            Effect-            Factors-            Frequency-            Location-            Severity-    PAIN AD SCORE:  [email or web address] (press ctrl + left click to view)    REVIEW OF SYSTEMS:   Full review of systems performed and was otherwise negative except as noted above.  Due to altered mental status, unable to obtain subjective information from the patient. History was obtained, to the extent possible, from review of the chart and collateral sources of information.    MEDICATIONS  (STANDING):  dextrose 5% + lactated ringers 1000 milliLiter(s) (100 mL/Hr) IV Continuous <Continuous>  potassium chloride  10 mEq/100 mL IVPB 10 milliEquivalent(s) IV Intermittent every 1 hour  potassium chloride  10 mEq/100 mL IVPB 10 milliEquivalent(s) IV Intermittent every 1 hour  venlafaxine XR. 75 milliGRAM(s) Oral every 24 hours    MEDICATIONS  (PRN):  ketorolac   Injectable 15 milliGRAM(s) IV Push every 6 hours PRN Moderate Pain (4 - 6)  ondansetron Injectable 4 milliGRAM(s) IV Push every 4 hours PRN Nausea and/or Vomiting    PHYSICAL EXAM:    Vital Signs Last 24 Hrs  T(C): 36.5 (13 Mar 2023 07:46), Max: 36.9 (12 Mar 2023 13:30)  T(F): 97.7 (13 Mar 2023 07:46), Max: 98.4 (12 Mar 2023 13:30)  HR: 81 (13 Mar 2023 07:46) (65 - 81)  BP: 130/89 (13 Mar 2023 07:46) (107/80 - 130/89)  BP(mean): --  RR: 18 (13 Mar 2023 07:46) (18 - 18)  SpO2: 95% (13 Mar 2023 07:46) (91% - 95%)    Parameters below as of 13 Mar 2023 07:46  Patient On (Oxygen Delivery Method): room air    General: pt lying in bed in NAD  HEENT: NCAT; MMM  Lungs: breathing unlabored; symmetric chest expansion  GI: soft; NTND  MSK: No apparent deformities  Neuro: fluent speech; awake and conversant; oriented x 3  Skin: no rash  Psych: calm; appropriate speech and affect    LABS:                        12.3   12.98 )-----------( 63       ( 13 Mar 2023 03:47 )             36.9     03-13    138  |  107  |  9.8  ----------------------------<  94  3.1<L>   |  20.0<L>  |  0.51    Ca    8.4      13 Mar 2023 03:47  Phos  3.2     03-13  Mg     2.0     03-13    TPro  4.9<L>  /  Alb  3.0<L>  /  TBili  0.4  /  DBili  0.1  /  AST  23  /  ALT  12  /  AlkPhos  131<H>  03-12    RADIOLOGY & ADDITIONAL STUDIES:  CT:    IMPRESSION:  1.  Severe rectal wall-thickening with luminal narrowing is without   significant change from 12/23/2022, however there is new severe large   bowel obstruction due to the mass.  2.  Large volume ascites, markedly increased.  3.  Stable right lower quadrant soft tissue nodule/metastatic adenopathy.   Otherwise, previously seen areas of peritoneal carcinomatosis are no   longer discretely visualized.    Findings were discussed with Dr. DONOVAN on 3/10/2023 at 1:00 PM by Dr. DOSHI with read back confirmation.    --- End of Report ---      PENELOPE DOSHI DO; Attending Radiologist   This document has been electronically signed. Mar 10 2023  1:07PM    NEUROLOGIC MEDICATIONS/OPIOIDS/BENZODIAZEPINES IN PAST 24HRS    clonazePAM  Tablet   0.5 milliGRAM(s) Oral (03-12-23 @ 17:13)    ketorolac   Injectable   15 milliGRAM(s) IV Push (03-12-23 @ 17:14)    venlafaxine XR.   75 milliGRAM(s) Oral (03-12-23 @ 22:21)     HPI:  58yo F w/ PMH of stage IV rectal CA dx'd in 8/2022 on FOLFOX who underwent f/u CT which revealed colonic obstruxn 2/2 rectal mass who p/w diarrhea and abdominal distention.  Pt has recurrent ascites for which she has undergone prior large volume paracenteses.  Pt was seen by GI to eval for possible colonic stent but they felt high risk for perforation and recommend colorectal surgery who now plan on colectomy with diverting colostomy (with pre-op paracentesis). We are consulted for assistance with goals of care.    Multiple attempts made to see pt today but was in OR - will f/u tomorrow and complete consultation then    PERTINENT PMH REVIEWED: Yes    PAST MEDICAL & SURGICAL HISTORY:  as above    SOCIAL HISTORY:                                            Surrogate/HCP/Guardian: Phone#: spouse Lenny Cruz - CE[place] [phone]  [home  [phone]]    FAMILY HISTORY:  reviewed and noncontributory    Allergies  No Known Allergies    ADVANCE DIRECTIVES/TREATMENT PREFERENCES:  Full code, all aggressive measures desired   DNR/DNI - MOLST, continue all other medical treatments  DNR/DNI - MOLST, comfort measures only     Baseline ADLs (prior to admission):  Independent  Dependent      Karnofsky/Palliative Performance Status Version 2:  %  http://npcrc.org/files/news/palliative_performance_scale_ppsv2.pdf    PRESENT SYMPTOMS:     Dyspnea: Yes No  Nausea/Vomiting: Yes No  Anxiety:  Yes No  Depression: Yes No  Fatigue: Yes No  Loss of appetite: Yes No  Constipation: Yes No    PAIN:              Character-            Duration-            Effect-            Factors-            Frequency-            Location-            Severity-    PAIN AD SCORE:  [email or web address] (press ctrl + left click to view)    REVIEW OF SYSTEMS:   Full review of systems performed and was otherwise negative except as noted above.  Due to altered mental status, unable to obtain subjective information from the patient. History was obtained, to the extent possible, from review of the chart and collateral sources of information.    MEDICATIONS  (STANDING):  dextrose 5% + lactated ringers 1000 milliLiter(s) (100 mL/Hr) IV Continuous <Continuous>  potassium chloride  10 mEq/100 mL IVPB 10 milliEquivalent(s) IV Intermittent every 1 hour  potassium chloride  10 mEq/100 mL IVPB 10 milliEquivalent(s) IV Intermittent every 1 hour  venlafaxine XR. 75 milliGRAM(s) Oral every 24 hours    MEDICATIONS  (PRN):  ketorolac   Injectable 15 milliGRAM(s) IV Push every 6 hours PRN Moderate Pain (4 - 6)  ondansetron Injectable 4 milliGRAM(s) IV Push every 4 hours PRN Nausea and/or Vomiting    PHYSICAL EXAM:    Vital Signs Last 24 Hrs  T(C): 36.5 (13 Mar 2023 07:46), Max: 36.9 (12 Mar 2023 13:30)  T(F): 97.7 (13 Mar 2023 07:46), Max: 98.4 (12 Mar 2023 13:30)  HR: 81 (13 Mar 2023 07:46) (65 - 81)  BP: 130/89 (13 Mar 2023 07:46) (107/80 - 130/89)  BP(mean): --  RR: 18 (13 Mar 2023 07:46) (18 - 18)  SpO2: 95% (13 Mar 2023 07:46) (91% - 95%)    Parameters below as of 13 Mar 2023 07:46  Patient On (Oxygen Delivery Method): room air    General: pt lying in bed in NAD  HEENT: NCAT; MMM  Lungs: breathing unlabored; symmetric chest expansion  GI: soft; NTND  MSK: No apparent deformities  Neuro: fluent speech; awake and conversant; oriented x 3  Skin: no rash  Psych: calm; appropriate speech and affect    LABS:                        12.3   12.98 )-----------( 63       ( 13 Mar 2023 03:47 )             36.9     03-13    138  |  107  |  9.8  ----------------------------<  94  3.1<L>   |  20.0<L>  |  0.51    Ca    8.4      13 Mar 2023 03:47  Phos  3.2     03-13  Mg     2.0     03-13    TPro  4.9<L>  /  Alb  3.0<L>  /  TBili  0.4  /  DBili  0.1  /  AST  23  /  ALT  12  /  AlkPhos  131<H>  03-12    RADIOLOGY & ADDITIONAL STUDIES:  CT:    IMPRESSION:  1.  Severe rectal wall-thickening with luminal narrowing is without   significant change from 12/23/2022, however there is new severe large   bowel obstruction due to the mass.  2.  Large volume ascites, markedly increased.  3.  Stable right lower quadrant soft tissue nodule/metastatic adenopathy.   Otherwise, previously seen areas of peritoneal carcinomatosis are no   longer discretely visualized.    Findings were discussed with Dr. DONOVAN on 3/10/2023 at 1:00 PM by Dr. DOSHI with read back confirmation.    --- End of Report ---      PENELOPE DOSHI DO; Attending Radiologist   This document has been electronically signed. Mar 10 2023  1:07PM    NEUROLOGIC MEDICATIONS/OPIOIDS/BENZODIAZEPINES IN PAST 24HRS    clonazePAM  Tablet   0.5 milliGRAM(s) Oral (03-12-23 @ 17:13)    ketorolac   Injectable   15 milliGRAM(s) IV Push (03-12-23 @ 17:14)    venlafaxine XR.   75 milliGRAM(s) Oral (03-12-23 @ 22:21)     HPI:  56yo F w/ PMH of stage IV rectal CA dx'd in 8/2022 on FOLFOX who underwent f/u CT which revealed colonic obstruxn 2/2 rectal mass who p/w diarrhea and abdominal distention.  Pt has recurrent ascites for which she has undergone prior large volume paracenteses.  Pt was seen by GI to eval for possible colonic stent but they felt high risk for perforation and recommend colorectal surgery who now plan on colectomy with diverting colostomy (with pre-op paracentesis). Pt underwent both interventions 3/13 without complication. We are consulted for assistance with goals of care.      PERTINENT PMH REVIEWED: Yes    PAST MEDICAL & SURGICAL HISTORY:  as above    SOCIAL HISTORY:                                            Surrogate/HCP/Guardian: Phone#: spouse Lenny Cruz - [place] [phone]  [home  [phone]]    FAMILY HISTORY:  reviewed and noncontributory    Allergies  No Known Allergies    ADVANCE DIRECTIVES/TREATMENT PREFERENCES:  Full code, all aggressive measures desired   DNR/DNI - MOLST, continue all other medical treatments  DNR/DNI - MOLST, comfort measures only     Baseline ADLs (prior to admission):  Independent  Dependent      Karnofsky/Palliative Performance Status Version 2:  %  http://npcrc.org/files/news/palliative_performance_scale_ppsv2.pdf    PRESENT SYMPTOMS:     Dyspnea: Yes No  Nausea/Vomiting: Yes No  Anxiety:  Yes No  Depression: Yes No  Fatigue: Yes No  Loss of appetite: Yes No  Constipation: Yes No    PAIN:              Character-            Duration-            Effect-            Factors-            Frequency-            Location-            Severity-    PAIN AD SCORE:  [email or web address] (press ctrl + left click to view)    REVIEW OF SYSTEMS:   Full review of systems performed and was otherwise negative except as noted above.  Due to altered mental status, unable to obtain subjective information from the patient. History was obtained, to the extent possible, from review of the chart and collateral sources of information.    MEDICATIONS  (STANDING):  dextrose 5% + lactated ringers 1000 milliLiter(s) (100 mL/Hr) IV Continuous <Continuous>  potassium chloride  10 mEq/100 mL IVPB 10 milliEquivalent(s) IV Intermittent every 1 hour  potassium chloride  10 mEq/100 mL IVPB 10 milliEquivalent(s) IV Intermittent every 1 hour  venlafaxine XR. 75 milliGRAM(s) Oral every 24 hours    MEDICATIONS  (PRN):  ketorolac   Injectable 15 milliGRAM(s) IV Push every 6 hours PRN Moderate Pain (4 - 6)  ondansetron Injectable 4 milliGRAM(s) IV Push every 4 hours PRN Nausea and/or Vomiting    PHYSICAL EXAM:    Vital Signs Last 24 Hrs  T(C): 36.5 (13 Mar 2023 07:46), Max: 36.9 (12 Mar 2023 13:30)  T(F): 97.7 (13 Mar 2023 07:46), Max: 98.4 (12 Mar 2023 13:30)  HR: 81 (13 Mar 2023 07:46) (65 - 81)  BP: 130/89 (13 Mar 2023 07:46) (107/80 - 130/89)  BP(mean): --  RR: 18 (13 Mar 2023 07:46) (18 - 18)  SpO2: 95% (13 Mar 2023 07:46) (91% - 95%)    Parameters below as of 13 Mar 2023 07:46  Patient On (Oxygen Delivery Method): room air    General: pt lying in bed in NAD  HEENT: NCAT; MMM  Lungs: breathing unlabored; symmetric chest expansion  GI: soft; NTND  MSK: No apparent deformities  Neuro: fluent speech; awake and conversant; oriented x 3  Skin: no rash  Psych: calm; appropriate speech and affect    LABS:                        12.3   12.98 )-----------( 63       ( 13 Mar 2023 03:47 )             36.9     03-13    138  |  107  |  9.8  ----------------------------<  94  3.1<L>   |  20.0<L>  |  0.51    Ca    8.4      13 Mar 2023 03:47  Phos  3.2     03-13  Mg     2.0     03-13    TPro  4.9<L>  /  Alb  3.0<L>  /  TBili  0.4  /  DBili  0.1  /  AST  23  /  ALT  12  /  AlkPhos  131<H>  03-12    RADIOLOGY & ADDITIONAL STUDIES:  CT:    IMPRESSION:  1.  Severe rectal wall-thickening with luminal narrowing is without   significant change from 12/23/2022, however there is new severe large   bowel obstruction due to the mass.  2.  Large volume ascites, markedly increased.  3.  Stable right lower quadrant soft tissue nodule/metastatic adenopathy.   Otherwise, previously seen areas of peritoneal carcinomatosis are no   longer discretely visualized.    Findings were discussed with Dr. DONOVAN on 3/10/2023 at 1:00 PM by Dr. DOSHI with read back confirmation.    --- End of Report ---      PENELOPE DOSHI DO; Attending Radiologist   This document has been electronically signed. Mar 10 2023  1:07PM    NEUROLOGIC MEDICATIONS/OPIOIDS/BENZODIAZEPINES IN PAST 24HRS    clonazePAM  Tablet   0.5 milliGRAM(s) Oral (03-12-23 @ 17:13)    ketorolac   Injectable   15 milliGRAM(s) IV Push (03-12-23 @ 17:14)    venlafaxine XR.   75 milliGRAM(s) Oral (03-12-23 @ 22:21)

## 2023-03-13 NOTE — BRIEF OPERATIVE NOTE - NSICDXBRIEFPOSTOP_GEN_ALL_CORE_FT
POST-OP DIAGNOSIS:  Large bowel obstruction 13-Mar-2023 18:41:37  Autumn Saavedra  
POST-OP DIAGNOSIS:  Ascites 13-Mar-2023 11:55:12  Santos Rubio

## 2023-03-14 NOTE — DIETITIAN INITIAL EVALUATION ADULT - ORAL INTAKE PTA/DIET HISTORY
Spoke with pt and family -  reports poor appetite, PO intake, diet tolerance since September 2022 with  approx 30lb weight loss noted. Pt s/p colostomy placement- education provided, all questions answered.

## 2023-03-14 NOTE — DIETITIAN INITIAL EVALUATION ADULT - NSFNSGIIOFT_GEN_A_CORE
03-13-23 @ 07:01  -  03-14-23 @ 07:00  --------------------------------------------------------  OUT:    Colostomy (mL): 270 mL  Total OUT: 270 mL    Total NET: -270 mL      03-14-23 @ 07:01  -  03-14-23 @ 13:31  --------------------------------------------------------  OUT:    Colostomy (mL): 125 mL  Total OUT: 125 mL    Total NET: -125 mL

## 2023-03-14 NOTE — CONSULT NOTE ADULT - SUBJECTIVE AND OBJECTIVE BOX
HPI:  58yo F w/ PMH of stage IV rectal CA dx'd in 8/2022 on FOLFOX who underwent f/u CT which revealed colonic obstruxn 2/2 rectal mass who p/w diarrhea and abdominal distention.  Pt has recurrent ascites for which she has undergone prior large volume paracenteses.  Pt was seen by GI to eval for possible colonic stent but they felt high risk for perforation and recommend colorectal surgery who now plan on colectomy with diverting colostomy (with pre-op paracentesis). Pt underwent both interventions 3/13 without complication. We are consulted for assistance with goals of care.      PERTINENT PMH REVIEWED: Yes    PAST MEDICAL & SURGICAL HISTORY:  as above    SOCIAL HISTORY:                                            Surrogate/HCP/Guardian: Phone#: spouse Lenny Cruz - CE[place] [phone]  [home  [phone]]    FAMILY HISTORY:  reviewed and noncontributory    Allergies  No Known Allergies    ADVANCE DIRECTIVES/TREATMENT PREFERENCES:  Full code, all aggressive measures desired   DNR/DNI - MOLST, continue all other medical treatments  DNR/DNI - MOLST, comfort measures only     Baseline ADLs (prior to admission):  Independent  Dependent      Karnofsky/Palliative Performance Status Version 2:  %  http://npcrc.org/files/news/palliative_performance_scale_ppsv2.pdf    PRESENT SYMPTOMS:     Dyspnea: Yes No  Nausea/Vomiting: Yes No  Anxiety:  Yes No  Depression: Yes No  Fatigue: Yes No  Loss of appetite: Yes No  Constipation: Yes No    PAIN:              Character-            Duration-            Effect-            Factors-            Frequency-            Location-            Severity-    PAIN AD SCORE:  [email or web address] (press ctrl + left click to view)    REVIEW OF SYSTEMS:   Full review of systems performed and was otherwise negative except as noted above.  Due to altered mental status, unable to obtain subjective information from the patient. History was obtained, to the extent possible, from review of the chart and collateral sources of information.    MEDICATIONS  (STANDING):  dextrose 5% + lactated ringers 1000 milliLiter(s) (100 mL/Hr) IV Continuous <Continuous>  potassium chloride  10 mEq/100 mL IVPB 10 milliEquivalent(s) IV Intermittent every 1 hour  potassium chloride  10 mEq/100 mL IVPB 10 milliEquivalent(s) IV Intermittent every 1 hour  venlafaxine XR. 75 milliGRAM(s) Oral every 24 hours    MEDICATIONS  (PRN):  ketorolac   Injectable 15 milliGRAM(s) IV Push every 6 hours PRN Moderate Pain (4 - 6)  ondansetron Injectable 4 milliGRAM(s) IV Push every 4 hours PRN Nausea and/or Vomiting    PHYSICAL EXAM:    Vital Signs Last 24 Hrs  T(C): 36.5 (13 Mar 2023 07:46), Max: 36.9 (12 Mar 2023 13:30)  T(F): 97.7 (13 Mar 2023 07:46), Max: 98.4 (12 Mar 2023 13:30)  HR: 81 (13 Mar 2023 07:46) (65 - 81)  BP: 130/89 (13 Mar 2023 07:46) (107/80 - 130/89)  BP(mean): --  RR: 18 (13 Mar 2023 07:46) (18 - 18)  SpO2: 95% (13 Mar 2023 07:46) (91% - 95%)    Parameters below as of 13 Mar 2023 07:46  Patient On (Oxygen Delivery Method): room air    General: pt lying in bed in NAD  HEENT: NCAT; MMM  Lungs: breathing unlabored; symmetric chest expansion  GI: soft; NTND  MSK: No apparent deformities  Neuro: fluent speech; awake and conversant; oriented x 3  Skin: no rash  Psych: calm; appropriate speech and affect    LABS:                        12.3   12.98 )-----------( 63       ( 13 Mar 2023 03:47 )             36.9     03-13    138  |  107  |  9.8  ----------------------------<  94  3.1<L>   |  20.0<L>  |  0.51    Ca    8.4      13 Mar 2023 03:47  Phos  3.2     03-13  Mg     2.0     03-13    TPro  4.9<L>  /  Alb  3.0<L>  /  TBili  0.4  /  DBili  0.1  /  AST  23  /  ALT  12  /  AlkPhos  131<H>  03-12    RADIOLOGY & ADDITIONAL STUDIES:  CT:    IMPRESSION:  1.  Severe rectal wall-thickening with luminal narrowing is without   significant change from 12/23/2022, however there is new severe large   bowel obstruction due to the mass.  2.  Large volume ascites, markedly increased.  3.  Stable right lower quadrant soft tissue nodule/metastatic adenopathy.   Otherwise, previously seen areas of peritoneal carcinomatosis are no   longer discretely visualized.    Findings were discussed with Dr. DONOVAN on 3/10/2023 at 1:00 PM by Dr. DOSHI with read back confirmation.    --- End of Report ---      PENELOPE DOSHI DO; Attending Radiologist   This document has been electronically signed. Mar 10 2023  1:07PM HPI:  58yo F w/ PMH of stage IV rectal CA dx'd in 8/2022 on FOLFOX who underwent f/u CT which revealed colonic obstruxn 2/2 rectal mass who p/w diarrhea and abdominal distention.  Pt has recurrent ascites for which she has undergone prior large volume paracenteses.  Pt was seen by GI to eval for possible colonic stent but they felt high risk for perforation and recommend colorectal surgery who now plan on colectomy with diverting colostomy (with pre-op paracentesis). Pt underwent both interventions 3/13 without complication. We are consulted for assistance with goals of care.    Pt seen lying in bed in NAD.  She reports relief of abd distention and diarrhea and also reports some abd pain at surgical site.  She confirms that she takes oxy IR 5mg at home only occasionally and that she tolerates it well.  She also takes klonopin 0.5mg QAM.  She feels anxiety has been an issue since being here.  Denies nausea or SOB.    PERTINENT PMH REVIEWED: Yes    PAST MEDICAL & SURGICAL HISTORY:  as above    SOCIAL HISTORY:                      Pt lives with her  Lenny  Oncologist is Dr Calabrese                      Surrogate/HCP/Guardian: Phone#: spouse Lenny Cruz - [place] [phone]  [home  [phone]]    FAMILY HISTORY:  reviewed and noncontributory    Allergies  No Known Allergies    ADVANCE DIRECTIVES/TREATMENT PREFERENCES:  Full code, all aggressive measures desired     Baseline ADLs (prior to admission):  Independent    Karnofsky/Palliative Performance Status Version 2:  70%  http://npcrc.org/files/news/palliative_performance_scale_ppsv2.pdf    PRESENT SYMPTOMS:     Dyspnea: No  Nausea/Vomiting: No  Anxiety:  Yes   Depression: No  Fatigue: Yes   Loss of appetite: No  Constipation: No    PAIN:  as above            Character-            Duration-            Effect-            Factors-            Frequency-            Location-            Severity-    REVIEW OF SYSTEMS:   Full review of systems performed and was otherwise negative except as noted above.    MEDICATIONS  (STANDING):  dextrose 5% + lactated ringers 1000 milliLiter(s) (100 mL/Hr) IV Continuous <Continuous>  potassium chloride  10 mEq/100 mL IVPB 10 milliEquivalent(s) IV Intermittent every 1 hour  potassium chloride  10 mEq/100 mL IVPB 10 milliEquivalent(s) IV Intermittent every 1 hour  venlafaxine XR. 75 milliGRAM(s) Oral every 24 hours    MEDICATIONS  (PRN):  ketorolac   Injectable 15 milliGRAM(s) IV Push every 6 hours PRN Moderate Pain (4 - 6)  ondansetron Injectable 4 milliGRAM(s) IV Push every 4 hours PRN Nausea and/or Vomiting    PHYSICAL EXAM:    Vital Signs Last 24 Hrs  T(C): 36.5 (13 Mar 2023 07:46), Max: 36.9 (12 Mar 2023 13:30)  T(F): 97.7 (13 Mar 2023 07:46), Max: 98.4 (12 Mar 2023 13:30)  HR: 81 (13 Mar 2023 07:46) (65 - 81)  BP: 130/89 (13 Mar 2023 07:46) (107/80 - 130/89)  BP(mean): --  RR: 18 (13 Mar 2023 07:46) (18 - 18)  SpO2: 95% (13 Mar 2023 07:46) (91% - 95%)    Parameters below as of 13 Mar 2023 07:46  Patient On (Oxygen Delivery Method): room air    General: pt lying in bed in NAD  HEENT: NCAT; MMM  Lungs: breathing unlabored; symmetric chest expansion  GI: soft; NTND  MSK: No apparent deformities  Neuro: fluent speech; awake and conversant; oriented x 3  Skin: no rash  Psych: calm; appropriate speech and affect    LABS:                        12.3   12.98 )-----------( 63       ( 13 Mar 2023 03:47 )             36.9     03-13    138  |  107  |  9.8  ----------------------------<  94  3.1<L>   |  20.0<L>  |  0.51    Ca    8.4      13 Mar 2023 03:47  Phos  3.2     03-13  Mg     2.0     03-13    TPro  4.9<L>  /  Alb  3.0<L>  /  TBili  0.4  /  DBili  0.1  /  AST  23  /  ALT  12  /  AlkPhos  131<H>  03-12    RADIOLOGY & ADDITIONAL STUDIES:  CT:    IMPRESSION:  1.  Severe rectal wall-thickening with luminal narrowing is without   significant change from 12/23/2022, however there is new severe large   bowel obstruction due to the mass.  2.  Large volume ascites, markedly increased.  3.  Stable right lower quadrant soft tissue nodule/metastatic adenopathy.   Otherwise, previously seen areas of peritoneal carcinomatosis are no   longer discretely visualized.    Findings were discussed with Dr. CALABRESE on 3/10/2023 at 1:00 PM by Dr. DOSHI with read back confirmation.    --- End of Report ---      PENELOPE DOSHI DO; Attending Radiologist   This document has been electronically signed. Mar 10 2023  1:07PM

## 2023-03-14 NOTE — DIETITIAN INITIAL EVALUATION ADULT - PERTINENT LABORATORY DATA
03-14    135  |  104  |  5.2<L>  ----------------------------<  132<H>  3.5   |  24.0  |  0.40<L>    Ca    7.7<L>      14 Mar 2023 05:11  Phos  2.1     03-14  Mg     1.6     03-14

## 2023-03-14 NOTE — CONSULT NOTE ADULT - ASSESSMENT
58yo F w/ PMH of stage IV rectal CA dx'd in 8/2022 on FOLFOX who underwent f/u CT which revealed colonic obstruxn 2/2 rectal mass who p/w diarrhea and abdominal distention.  Pt has recurrent ascites for which she has undergone prior large volume paracenteses.  Pt was seen by GI to eval for possible colonic stent but they felt high risk for perforation and recommend colorectal surgery who now plan on colectomy with diverting colostomy (with pre-op paracentesis). Pt underwent both interventions 3/13 without complication. We are consulted for assistance with goals of care.  #Goals of care  - Surrogate/HCP/Guardian: Phone#: spouse Lenny Cruz - [place] [phone]  [home  [phone]]  - re: CRC, I reviewed most recent oncology note - Dr Calabrese - in addition to rectal mass, w/u revealed malignant ascites, cirrhotic liver (not mets noted), omental caking and peritoneal carcinomatosis.  Plan was to stop oxaliplatin and, if POD on f/u imaging, to change to FOLFIRI.        - reviewed ISTOP - Reference #: [id number] - pt Rx'd oxy IR 5mg tabs and clonazepam 0.5mg tabs 58yo F w/ PMH of stage IV rectal CA dx'd in 8/2022 on FOLFOX who underwent f/u CT which revealed colonic obstruxn 2/2 rectal mass who p/w diarrhea and abdominal distention.  Pt has recurrent ascites for which she has undergone prior large volume paracenteses.  Pt was seen by GI to eval for possible colonic stent but they felt high risk for perforation and recommend colorectal surgery who now plan on colectomy with diverting colostomy (with pre-op paracentesis). Pt underwent both interventions 3/13 without complication. We are consulted for assistance with goals of care.  #Goals of care  - Surrogate/HCP/Guardian: Phone#: spouse Lenny Cruz - [place] [phone]  [home  [phone]] - I d/w pt today and she confirmed that Lenny is her preferred surrogate  - re: CRC, I reviewed most recent oncology note - Dr Calabrese - in addition to rectal mass, w/u revealed malignant ascites, cirrhotic liver (not mets noted), omental caking and peritoneal carcinomatosis.  Plan was to stop oxaliplatin and, if POD on f/u imaging, to change to FOLFIRI.    - today meeting for first time - will discuss code status preferences at a future visit    #Pain - post-op  - would restart home oxy IR 5mg PO Q4hrs prn  - reviewed ISTOP - Reference #: [id number] - pt Rx'd oxy IR 5mg tabs and clonazepam 0.5mg tabs    #Anxiety  - restart home klonopin 0.5mg daily prn

## 2023-03-14 NOTE — DIETITIAN INITIAL EVALUATION ADULT - OTHER INFO
Patient is a 57 year old female with hx of Anxiety and stage IV rectal cancer on FOLFOX chemotherapy sent in when monitoring CT showed large bowel obstruction 2/2 rectal mass. She is now s/p diverting colostomy. Patient tolerated procedure without issues.

## 2023-03-14 NOTE — DIETITIAN INITIAL EVALUATION ADULT - PHYSCIAL ASSESSMENT
-- DO NOT REPLY / DO NOT REPLY ALL --  -- Message is from the [facility]--    General Patient Message      Reason for Call: Patient was having stomach pain and has been vomiting.     Caller Information       Type Contact Phone    03/13/2020 08:36 AM Phone (Incoming) Keila Sahni (Self) [phone] (M)          Alternative phone number: n/a    Turnaround time given to caller:   \"This message will be sent to [state Provider's name]. The clinical team will fulfill your request as soon as they review your message.\"}    
debilitated

## 2023-03-14 NOTE — PROGRESS NOTE ADULT - SUBJECTIVE AND OBJECTIVE BOX
INTERVAL HPI/OVERNIGHT EVENTS:    Patient evaluated at bedside. POD1 of creation of a diverting colostomy secondary to large bowel obstruction. Patient had leakage of the ostomy appliance and needed removal and reapplication. She tolerated well. Denies N/V. No SOB or chest pain. Ostomy bag with output.     MEDICATIONS  (STANDING):  dextrose 5% + lactated ringers 1000 milliLiter(s) (100 mL/Hr) IV Continuous <Continuous>  venlafaxine XR. 75 milliGRAM(s) Oral every 24 hours    MEDICATIONS  (PRN):  clonazePAM  Tablet 0.5 milliGRAM(s) Oral every 8 hours PRN anxiety  ondansetron Injectable 4 milliGRAM(s) IV Push every 4 hours PRN Nausea and/or Vomiting      Vital Signs Last 24 Hrs  T(C): 36.4 (14 Mar 2023 00:00), Max: 37.1 (13 Mar 2023 11:41)  T(F): 97.6 (14 Mar 2023 00:00), Max: 98.7 (13 Mar 2023 11:41)  HR: 97 (14 Mar 2023 00:00) (73 - 97)  BP: 112/76 (14 Mar 2023 00:00) (101/79 - 130/89)  BP(mean): 94 (13 Mar 2023 22:00) (88 - 98)  RR: 18 (14 Mar 2023 00:00) (10 - 18)  SpO2: 99% (14 Mar 2023 00:00) (95% - 100%)    Parameters below as of 14 Mar 2023 00:00  Patient On (Oxygen Delivery Method): room air      Physical Exam:   Constitutional: NAD, mal nourished  HEENT: PERRLA, EOMI, no drainage or redness  Respiratory: Breath Sounds equal & clear to percussion & auscultation, no accessory muscle use  Cardiovascular: Regular rate & rhythm, normal S1, S2;  Gastrointestinal: Soft, non-tender, distended, ostomy pink. midline incision that is c/d/i and well approximated  Skin: No rashes      I&O's Detail    12 Mar 2023 07:01  -  13 Mar 2023 07:00  --------------------------------------------------------  IN:    dextrose 5% + lactated ringers w/ Additives: 1200 mL  Total IN: 1200 mL    OUT:  Total OUT: 0 mL    Total NET: 1200 mL      13 Mar 2023 07:01  -  14 Mar 2023 02:58  --------------------------------------------------------  IN:    dextrose 5% + lactated ringers w/ Additives: 200 mL    Lactated Ringers: 300 mL  Total IN: 500 mL    OUT:    Colostomy (mL): 130 mL    Indwelling Catheter - Urethral (mL): 500 mL  Total OUT: 630 mL    Total NET: -130 mL          LABS:                        12.3   12.98 )-----------( 63       ( 13 Mar 2023 03:47 )             36.9     03-13    138  |  105  |  9.0  ----------------------------<  96  3.6   |  24.0  |  0.49<L>    Ca    8.5      13 Mar 2023 12:00  Phos  3.2     03-13  Mg     2.0     03-13    TPro  4.9<L>  /  Alb  3.0<L>  /  TBili  0.4  /  DBili  0.1  /  AST  23  /  ALT  12  /  AlkPhos  131<H>  03-12          RADIOLOGY & ADDITIONAL STUDIES:

## 2023-03-14 NOTE — PROGRESS NOTE ADULT - ASSESSMENT
A: Patient is a 57 year old female with hx of Anxiety and stage IV rectal cancer on FOLFOX chemotherapy sent in when monitoring CT showed large bowel obstruction 2/2 rectal mass. She is now s/p diverting colostomy. Patient tolerated procedure without issues.     Plan  - CLD, ill ADAT  - Please document I's and O's  - Multi modral pain control  - IV hydration PRN  - Carter in place, will likely remove in the AM, pending labs   -AM labs, will replete as needed.   - Anti-emetics for nausea  -Palliative consult- appreciate recs  - Remainder of plan pending clinical course.

## 2023-03-14 NOTE — DIETITIAN INITIAL EVALUATION ADULT - PERTINENT MEDS FT
MEDICATIONS  (STANDING):  acetaminophen     Tablet .. 975 milliGRAM(s) Oral every 8 hours  dextrose 5% + lactated ringers 1000 milliLiter(s) (100 mL/Hr) IV Continuous <Continuous>  enoxaparin Injectable 40 milliGRAM(s) SubCutaneous every 24 hours  ibuprofen  Tablet. 600 milliGRAM(s) Oral every 8 hours  venlafaxine XR. 75 milliGRAM(s) Oral every 24 hours    MEDICATIONS  (PRN):  ondansetron Injectable 4 milliGRAM(s) IV Push every 4 hours PRN Nausea and/or Vomiting  oxyCODONE    IR 5 milliGRAM(s) Oral every 4 hours PRN Severe Pain (7 - 10)

## 2023-03-15 NOTE — CONSULT NOTE ADULT - ASSESSMENT
56 yo F with no significant PMH who was diagnosed with rectal cancer in August 2022.  Colonoscopy on 8/16/2 due to a change in bowel habits which showed a near obstructing rectal mass from 6 cm above the anal verge to approximately 12 cm. Biopsies of the mass consistent with poorly differentiated adenocarcinoma with signet ring cells and focal mucin.   Staging CT A/P demonstrated extensive ~ 6cm rectal mass with infiltration of the pelvic sidewalls. There was also findings of large amount of ascitic fluid consistent with malignant ascites as well as omental caking and peritoneal carcinomatosis. Cirrhotic liver. The patient was started on treatment with FOLFOX. The patient underwent routine imaging for restaging and found to have evidence of obstruction without other radiographic evidence of progression.   - Will follow up as outpatient to discuss reintiation of treatment. While imaging did not show evidence of progression, new onset obstruction is certainly concerning. Her performance status has worsened since initiating treatment, and I expressed my concern about her ability to tolerate multiagent chemotherapy. I discussed goals of care including continuing 5-FU (given absence of progressive disease on imaging), adding irinotecan, or comfort-based measures. Patient is not sure at this moment and will think about goals of care prior to her next visit with me.

## 2023-03-15 NOTE — CONSULT NOTE ADULT - SUBJECTIVE AND OBJECTIVE BOX
REASON FOR CONSULTATION    HPI:  57F with stage IV rectal cancer on FOLFOX chemotherapy sent in when monitoring CT showed large bowel obstruction 2/2 rectal mass. Patient states she has been distended and bloated for some time and is usually treated with paracentesis when they take off 12L. She is still passing gas but does acknowledge it is less than usual. She reports that though she usually has a few weeks of diarrhea after her chemo treatment, she has had them for a longer time since her last chemo treatment. Additionally, she says that with the abdominal distension, she has felt less of an appetite. She has not had any N/V.     The patient underwent colectomy with diverting colostomy. At the time of my visit, the patient reports significant improvement in her abdominal distension and comfort.  She denies fevers, chills, nausea, vomiting, diarrhea.     REVIEW OF SYSTEMS:    REVIEW OF SYSTEMS:   [X All ROS addressed below are non-contributory, except:  Neuro: [ ] Headache [  ]Back pain [ ] Numbness [ ] Weakness [ ] Ataxia [ ] Dizziness [ ] Aphasia [ ] Dysarthria [ ] Visual disturbance  Resp: [ ] Shortness of breath/dyspnea, [ ] Orthopnea [ ] Cough  CV: [ ] Chest pain [ ] Palpitation [ ] Lightheadedness [ ] Syncope  Renal: [ ] Thirst [ ] Edema [ ] hematuria   GI: [ ] Nausea [ ] Emesis [ ] Abdominal pain [ ] Constipation [ ] Diarrhea  Hem: [ ] Hematemesis [ ] bright red blood per rectum  ID: [ ] Fever [ ] Chills [ ] Dysuria  ENT: [ ] Rhinorrhea  Neuro [ ] Numbness [ ] tingling   Psych: [ ] confusion     PAST MEDICAL & SURGICAL HISTORY:  Rectal cancer          SOCIAL HISTORY:    FAMILY HISTORY:      SOCIAL HISTORY:    Allergies    No Known Allergies    Intolerances        MEDICATIONS  (STANDING):  acetaminophen     Tablet .. 975 milliGRAM(s) Oral every 8 hours  enoxaparin Injectable 40 milliGRAM(s) SubCutaneous every 24 hours  ibuprofen  Tablet. 600 milliGRAM(s) Oral every 8 hours  potassium chloride    Tablet ER 20 milliEquivalent(s) Oral every 2 hours  venlafaxine XR. 75 milliGRAM(s) Oral every 24 hours    MEDICATIONS  (PRN):  ondansetron Injectable 4 milliGRAM(s) IV Push every 4 hours PRN Nausea and/or Vomiting  oxyCODONE    IR 5 milliGRAM(s) Oral every 4 hours PRN Severe Pain (7 - 10)      Vital Signs Last 24 Hrs  T(C): 36.6 (15 Mar 2023 13:45), Max: 37.1 (15 Mar 2023 00:00)  T(F): 97.8 (15 Mar 2023 13:45), Max: 98.7 (15 Mar 2023 00:00)  HR: 86 (15 Mar 2023 13:45) (83 - 95)  BP: 93/63 (15 Mar 2023 13:45) (93/63 - 103/68)  BP(mean): --  RR: 18 (15 Mar 2023 13:45) (18 - 18)  SpO2: 92% (15 Mar 2023 13:45) (92% - 98%)    Parameters below as of 15 Mar 2023 13:45  Patient On (Oxygen Delivery Method): room air        PHYSICAL EXAM:    GENERAL: NAD. Patient is thin.   HEAD:  Atraumatic, Normocephalic  EYES: EOMI, PERRLA, conjunctiva and sclera clear  ENMT: No tonsillar erythema, exudates, or enlargement; Moist mucous membranes, Good dentition, No lesions  NECK: Supple, No JVD, Normal thyroid  NERVOUS SYSTEM:  Alert & Oriented X3, Good concentration; Motor Strength 5/5 B/L upper and lower extremities; DTRs 2+ intact and symmetric  CHEST/LUNG: Clear to percussion bilaterally; No rales, rhonchi, wheezing, or rubs  HEART: Regular rate and rhythm; No murmurs, rubs, or gallops  ABDOMEN: Soft, Nontender. Ostomy putting out brown stool.   EXTREMITIES:  2+ Peripheral Pulses, No clubbing, cyanosis, or edema  LYMPH: No lymphadenopathy noted  SKIN: No rashes or lesions      LABS:                        9.4    11.53 )-----------( 52       ( 15 Mar 2023 06:46 )             28.3     03-15    138  |  105  |  4.5<L>  ----------------------------<  90  3.0<L>   |  24.0  |  0.39<L>    Ca    7.8<L>      15 Mar 2023 06:46  Phos  2.4     03-15  Mg     1.8     03-15      RADIOLOGY & ADDITIONAL STUDIES:  < from: CT Abdomen and Pelvis/ Chest w/ IV Cont (03.10.23 @ 12:00) >  IMPRESSION:  1.  Severe rectal wall-thickening with luminal narrowing is without   significant change from 12/23/2022, however there is new severe large   bowel obstruction due to the mass.  2.  Large volume ascites, markedly increased.  3.  Stable right lower quadrant soft tissue nodule/metastatic adenopathy.   Otherwise, previously seen areas of peritoneal carcinomatosis are no   longer discretely visualized.        PATHOLOGY:

## 2023-03-15 NOTE — PROGRESS NOTE ADULT - ASSESSMENT
Patient is a 57 year old female with hx of Anxiety and stage IV rectal cancer on FOLFOX chemotherapy sent in when monitoring CT showed large bowel obstruction 2/2 rectal mass. She is now s/p diverting colostomy. Patient tolerated procedure without issues.     Plan  - FLD, ill ADAT  - Please document I's and O's  - Multi modral pain control  - IV hydration PRN  - Carter in place, will likely remove in the AM, pending labs   -AM labs, will replete as needed.   - Anti-emetics for nausea  -Palliative consult- appreciate recs  - Remainder of plan pending clinical course.

## 2023-03-15 NOTE — ADVANCED PRACTICE NURSE CONSULT - ASSESSMENT
Received patient sitting up in bed, awake, made aware of purpose of WOCN, agreeable to pouch change & ostomy teaching.  Jaycee drainable pouching system applied in place, barrier intact, no leakage. Pouch gently removed from pt's abdomen w/ water moistened gauze.     Type of ostomy: Diverting Colostomy   Location: LLQ  Dany: in place  Size: rounded, now measuring 30mm  Mucosa: red, moist, remains edematous & slightly translucent   Peristomal skin:   Mucocutaneous junction: intact  Abdominal contours: Flat   Output: scant brown liquid effluence     Patient re-pouched w/ Jaycee 2 ¾” CeraPlus flat barrier #63256 (cut to 30mm ) [place]  2 ¾” drainable pouch w/ lock & roll closure #[id number].  Full ostomy lesson provided to patient.    Impression:  Given pt's stomal characteristics and abdominal topography, pt still requires flat pouching system to protect peristomal skin w/ shrinking post-op stomal size    Patient declined eduction at this time requesting WOCN to return when family is present. Patient states she has family that will help with care and her sister and step-sister are RN and will help with care.      Written information regarding dietary restrictions, s/s & prevention of food obstruction & constipation, patient lifestyle modifications (clothing, bathing/showering, physical activity). Written information regarding all above topics provided to patient.  Patient verbalized she will review the folder.      Patient enrolled in [employer] secure start   Received patient sitting up in bed, awake, made aware of purpose of WOCN, currently declining ostomy education.  Jaycee drainable pouching system in place, barrier intact, no leakage.     Type of ostomy: Diverting Colostomy   Location: LLQ  Dany: in place  Size: rounded, now measuring 30mm  Mucosa: red, moist, remains edematous & slightly translucent   Peristomal skin:   Mucocutaneous junction: intact  Abdominal contours: Flat   Output: scant brown liquid effluence     Patient re-pouched w/ Jaycee 2 ¾” CeraPlus flat barrier #38869 (cut to 30mm ) [place]  2 ¾” drainable pouch w/ lock & roll closure #[id number].  Full ostomy lesson provided to patient.    Impression:  Given pt's stomal characteristics and abdominal topography, pt still requires flat pouching system to protect peristomal skin w/ shrinking post-op stomal size    Patient declined eduction at this time requesting WOCN to return when family is present. Patient states she has family that will help with care and her sister and step-sister are both RN and will help with care.     Written information regarding dietary restrictions, s/s & prevention of food obstruction & constipation, patient lifestyle modifications (clothing, bathing/showering, physical activity). Written information regarding all above topics provided to patient.  Patient verbalized she will review the folder.      Patient enrolled in [employer]

## 2023-03-15 NOTE — CONSULT NOTE ADULT - REASON FOR ADMISSION
obstructing rectal mass
abn ct scan

## 2023-03-16 NOTE — PROGRESS NOTE ADULT - SUBJECTIVE AND OBJECTIVE BOX
Subjective:  No acute events. Pain has been well controlled. Patient has not been out of bed much.     MEDICATIONS  (STANDING):  acetaminophen     Tablet .. 975 milliGRAM(s) Oral every 8 hours  enoxaparin Injectable 40 milliGRAM(s) SubCutaneous every 24 hours  ibuprofen  Tablet. 600 milliGRAM(s) Oral every 8 hours  melatonin 5 milliGRAM(s) Oral at bedtime  potassium chloride   Powder 40 milliEquivalent(s) Oral daily  venlafaxine XR. 75 milliGRAM(s) Oral every 24 hours    MEDICATIONS  (PRN):  ondansetron Injectable 4 milliGRAM(s) IV Push every 4 hours PRN Nausea and/or Vomiting  oxyCODONE    IR 5 milliGRAM(s) Oral every 4 hours PRN Severe Pain (7 - 10)      Vital Signs Last 24 Hrs  T(C): 37.1 (15 Mar 2023 23:41), Max: 37.1 (15 Mar 2023 23:41)  T(F): 98.8 (15 Mar 2023 23:41), Max: 98.8 (15 Mar 2023 23:41)  HR: 92 (15 Mar 2023 23:41) (83 - 94)  BP: 101/68 (15 Mar 2023 23:41) (93/63 - 103/68)  BP(mean): --  RR: 18 (15 Mar 2023 23:41) (18 - 18)  SpO2: 97% (15 Mar 2023 23:41) (92% - 97%)    Parameters below as of 15 Mar 2023 23:41  Patient On (Oxygen Delivery Method): room air          03-14  -  03-15  --------------------------------------------------------  IN:    dextrose 5% + lactated ringers w/ Additives: 800 mL  Total IN: 800 mL    OUT:    Colostomy (mL): 475 mL    Voided (mL): 350 mL  Total OUT: 825 mL    Total NET: -25 mL      03-15  -  03-16  --------------------------------------------------------  IN:  Total IN: 0 mL    OUT:    Colostomy (mL): 290 mL  Total OUT: 290 mL    Total NET: -290 mL      Physical Exam:    Constitutional: NAD, lying in bed   Respiratory: equal chest rise, unlabored breathing    Cardiovascular: RRR, palpable radial pulses   Gastrointestinal: ostomy with stool in bag,  Neurological: awake and alert        LABS:                        9.4    11.53 )-----------( 52       ( 15 Mar 2023 06:46 )             28.3     03-15    134<L>  |  104  |  7.4<L>  ----------------------------<  118<H>  4.5   |  23.0  |  0.35<L>    Ca    7.7<L>      15 Mar 2023 20:30  Phos  2.4     03-15  Mg     2.1     03-15            Assessment:  Patient is a 57 year old female with hx of Anxiety and stage IV rectal cancer on FOLFOX chemotherapy sent in when monitoring CT showed large bowel obstruction 2/2 rectal mass. She is now s/p diverting colostomy. Patient tolerated procedure without issues.     Plan  - L fiber diet and tolerating   - Please document I's and O's  - Multi modral pain control  - IV hydration PRN  -AM labs, will replete as needed.   - Anti-emetics for nausea  - need family ostomy teaching  - PT evaluation   - f/u with CM  - dispo planning

## 2023-03-16 NOTE — DISCHARGE NOTE PROVIDER - NSDCMRMEDTOKEN_GEN_ALL_CORE_FT
Dragan says every year at this time he has an ultrasound of his aorta and would like this ordered so he can schedule again. Let him know when the orders are in so he can schedule.    KlonoPIN 0.5 mg oral tablet: 1 tab(s) orally 3 times a day  ondansetron 8 mg oral tablet: 1 tab(s) orally every 8 hours, As Needed  potassium chloride 20 mEq oral tablet, extended release: 1 tab(s) orally 2 times a day  prochlorperazine 10 mg oral tablet: 1 tab(s) orally every 6 hours, As Needed  venlafaxine 75 mg oral capsule, extended release: 1 cap(s) orally once a day   ondansetron 8 mg oral tablet: 1 tab(s) orally every 8 hours, As Needed  potassium chloride 20 mEq oral tablet, extended release: 1 tab(s) orally 2 times a day  prochlorperazine 10 mg oral tablet: 1 tab(s) orally every 6 hours, As Needed  venlafaxine 75 mg oral capsule, extended release: 1 cap(s) orally once a day   ondansetron 8 mg oral tablet: 1 tab(s) orally every 8 hours, As Needed  potassium chloride 20 mEq oral tablet, extended release: 1 tab(s) orally 2 times a day  venlafaxine 75 mg oral capsule, extended release: 1 cap(s) orally once a day

## 2023-03-16 NOTE — DISCHARGE NOTE PROVIDER - NSDCFUSCHEDAPPT_GEN_ALL_CORE_FT
[facility]  [facility]  Scheduled Appointment: 03/28/2023    [facility]  [facility]  Scheduled Appointment: 03/28/2023    [facility]  [facility] [employer]  Scheduled Appointment: 03/30/2023

## 2023-03-16 NOTE — DISCHARGE NOTE NURSING/CASE MANAGEMENT/SOCIAL WORK - PATIENT PORTAL LINK FT
You can access the FollowMyHealth Patient Portal offered by [facility] by registering at the following website: [email or web address]. By joining [facility]’s FollowMyHealth portal, you will also be able to view your health information using other applications (apps) compatible with our system.

## 2023-03-16 NOTE — PROGRESS NOTE ADULT - CONVERSATION DETAILS
I again confirmed pt's preference for surrogate decision maker would be her  Lenny.  I discussed pt's code status preferences.  Pt had never been asked about them or discussed them with her oncologist or her family.  I reviewed the importance of clarifying preferences re: CPR or intubation in the event of emergency and that she should certainly discuss with her oncologist if she wishes to limit interventions.  She voiced understanding.  She further understands that, until and unless she stipulates otherwise, all such interventions would be performed.

## 2023-03-16 NOTE — DISCHARGE NOTE PROVIDER - DETAILS OF MALNUTRITION DIAGNOSIS/DIAGNOSES
This patient has been assessed with a concern for Malnutrition and was treated during this hospitalization for the following Nutrition diagnosis/diagnoses:     -  03/14/2023: Severe protein-calorie malnutrition

## 2023-03-16 NOTE — PHYSICAL THERAPY INITIAL EVALUATION ADULT - ADDITIONAL COMMENTS
Pt reports independent PTA, owns no DME. +, independent ADLs. Pt reports family will assist as needed.

## 2023-03-16 NOTE — DISCHARGE NOTE PROVIDER - NSDCCPCAREPLAN_GEN_ALL_CORE_FT
PRINCIPAL DISCHARGE DIAGNOSIS  Diagnosis: Large bowel obstruction  Assessment and Plan of Treatment:        PRINCIPAL DISCHARGE DIAGNOSIS  Diagnosis: Large bowel obstruction  Assessment and Plan of Treatment: Resume normal diet. Avoid straining, exercise, or heavy lifting.  Take medications as instructed by prescriptions.  It's OK to shower/rinse with warm/soapy water, pat dry (NO scrubbing or rubbing).  Follow-up with primary care doctor as well.   Call 911 and return to the ED for chest pain, shortness of breath, significant increase in pain, or significant change in color of surgical sites.

## 2023-03-16 NOTE — ADVANCED PRACTICE NURSE CONSULT - ASSESSMENT
Received patient sitting up in bed, awake, made aware of purpose of WOCN, agreeable to pouch change & ostomy teaching.  Jaycee drainable pouching system applied in place, barrier intact, no leakage. Pouch gently removed from pt's abdomen w/ water moistened gauze.     Type of ostomy: Diverting Colostomy   Location: LLQ  Size: oval in shape, unable to be rounded out, now measuring [id number]  Mucosa: red, moist, remains edematous & slightly translucent   Peristomal skin: Clean dry intact  Mucocutaneous junction: intact  Abdominal contours: soft and rounded  Output: scant brown liquid effluence  Midline/lap sites/ drains: mid abdominal incision with ecchymosis (CDI)    Patient re-pouched w/ Jaycee 2 ¾” CeraPlus flat barrier #85245 (cut to [id number]) and [place]  2 ¾” drainable pouch w/ lock & roll closure #[id number].  Full ostomy lesson provided to patient.    Impression:  Given pt's stomal characteristics and abdominal topography, pt still requires flat pouching system to protect peristomal skin w/ shrinking post-op stomal size    Patient and family ready & willing to learn ostomy care this visit, observed entire pouch change, looking directly at stoma, asking  appropriate questions & able to verbalize key ostomy points. Patient observed pouch opening/closing, again reviewed w/ patient that her stool consistency & frequency of Colostomy will require her to empty pouch several times each day initially & demonstrated practicing pouch opening/closing & emptying as this skill is required prior to d/c home, patient agreeable to start practicing pouch emptying and demonstrated with WOCN observation. Questions and Concerns regarding care reviewed with family at bedside.      Reviewed with patient dietary restrictions, s/s & prevention of food obstruction & constipation. Also reviewed w/ patient lifestyle modifications (clothing, bathing/showering, physical activity). Written information regarding all above topics provided to patient and family.        Patient enrolled in [employer] [facility]

## 2023-03-16 NOTE — DISCHARGE NOTE PROVIDER - NSDCFUADDINST_GEN_ALL_CORE_FT
Take tylenol 1000 mg every 6-8 hours and/or ibuprophen 600 mg every 6 hours x 7 days and/or until pain improves  Prescribed oxycodone 5 mg po every 6 hours for severe pain as needed   no heavy lifting, pushing or pulling more than 10 lbs for 6 weeks   follow up with oncologist and Dr. garnett (colorectal surgeon) in 1-2 weeks

## 2023-03-16 NOTE — DISCHARGE NOTE PROVIDER - HOSPITAL COURSE
57F with stage IV rectal cancer on FOLFOX chemotherapy who's follow up CT showed large bowel obstruction 2/2 rectal mass. She was distended and bloated for some time and is usually treated with paracentesis when they take off 12L. She is still passing gas but does acknowledge it is less than usual. She reports that though she usually has a few weeks of diarrhea after her chemo treatment, she has had them for a longer time since her last chemo treatment. Additionally, she says that with the abdominal distension, she has felt less of an appetite. She has not had any N/V. She was admitted and medically optimized for diverting loop colostomy for LBO. Prior to surgical intervention, medicine, GI and IR were consulted, as well as wound ostomy care nurse. Patient got paracentesis day before surgical intervention, was marked and prepared for surgery. She underwent diverting colostomy on March 13th. She tolerated procedure well with no complications. Remained admitted for 3 days post-operatively. During this period we controlled her pain with IV meds and transitioned to oral medicine. Her diet was also transitioned as tolerated. Her ostomy is productive of stool and gas. She is ok for discharge. Will follow as out patient. Once healed, could resume chemotherapy. Instructions provided. Ostomy care teaching reinforced. 57F with stage IV rectal cancer on FOLFOX chemotherapy who's follow up CT showed large bowel obstruction 2/2 rectal mass. She was distended and bloated for some time and is usually treated with paracentesis when they take off 12L. She is still passing gas but does acknowledge it is less than usual. She reports that though she usually has a few weeks of diarrhea after her chemo treatment, she has had them for a longer time since her last chemo treatment. Additionally, she says that with the abdominal distension, she has felt less of an appetite. She has not had any N/V. She was admitted and medically optimized for diverting loop colostomy for LBO. Prior to surgical intervention, medicine, GI and IR were consulted, as well as wound ostomy care nurse. Patient got paracentesis day before surgical intervention, was marked and prepared for surgery. She underwent diverting colostomy on March 13th. She tolerated procedure well with no complications. Remained admitted for 3 days post-operatively. During this period we controlled her pain with IV meds and transitioned to oral medicine. Her diet was also transitioned as tolerated. Her ostomy is productive of stool and gas. She is ok for discharge. Will follow as out patient. Once healed, could resume chemotherapy. Instructions provided. Ostomy care teaching reinforced.        COLOSTOMY CARE INSTRUCTIONS:  Empty bag as needed, record outputs and keep sheet to bring to post-op appt.   Change bag every two days or earlier if soiled/if needed    Wound care:   Midline incision can be left open to air, can place gauze with tape over if preferred 57F with stage IV rectal cancer on FOLFOX chemotherapy who's follow up CT showed large bowel obstruction 2/2 rectal mass. She was distended and bloated for some time and is usually treated with paracentesis when they take off 12L. She is still passing gas but does acknowledge it is less than usual. She reports that though she usually has a few weeks of diarrhea after her chemo treatment, she has had them for a longer time since her last chemo treatment. Additionally, she says that with the abdominal distension, she has felt less of an appetite. She has not had any N/V. She was admitted and medically optimized for diverting loop colostomy for LBO. Prior to surgical intervention, medicine, GI and IR were consulted, as well as wound ostomy care nurse. Patient got paracentesis day before surgical intervention, was marked and prepared for surgery. She underwent diverting colostomy on March 13th. She tolerated procedure well with no complications. Remained admitted for 3 days post-operatively. During this period we controlled her pain with IV meds and transitioned to oral medicine. Her diet was also transitioned as tolerated. Her ostomy is productive of stool and gas. She is ok for discharge. Will follow as out patient. Once healed, could resume chemotherapy. Instructions provided. Ostomy care teaching reinforced.        COLOSTOMY CARE INSTRUCTIONS:  Empty bag as needed, record outputs and keep sheet to bring to post-op appt.   Change bag every two days or earlier if soiled/if needed    Pouch change: 	  -Gently remove pouch water moistened gauze   -Cleanse stoma site with water moistened gauze, gently pat dry  -Measure stoma, currently [id number]  -Trace and cut current size on [place] 2 ¾” CeraPlus flat barrier (#22458)  -Stretch Jaycee Adapt CeraRing (#2425) onto back of barrier  -Apply barrier to patient's skin  -Attach [place] 2 ¾” drainable lock and roll pouch (#19419) onto barrier  -Empty pouch when 1/3 to no more than 1/2 full of effluent/flatus. Change pouching system q 3 - 4 days and prn for leaking. Next pouch change-    Wound care:   Midline incision can be left open to air, can place gauze with tape over if preferred

## 2023-03-16 NOTE — PROGRESS NOTE ADULT - ASSESSMENT
56yo F w/ PMH of stage IV rectal CA dx'd in 8/2022 on FOLFOX who underwent f/u CT which revealed colonic obstruxn 2/2 rectal mass who p/w diarrhea and abdominal distention.  Pt has recurrent ascites for which she has undergone prior large volume paracenteses.  Pt was seen by GI to eval for possible colonic stent but they felt high risk for perforation and recommend colorectal surgery who now plan on colectomy with diverting colostomy (with pre-op paracentesis). Pt underwent both interventions 3/13 without complication. We are consulted for assistance with goals of care.  #Goals of care  - Surrogate/HCP/Guardian: Phone#: spouse Lenny Cruz - [place] [phone]  [home  [phone]] - Lenny is her preferred surrogate  - re: CRC, per most recent oncology note - Dr Calabrese - in addition to rectal mass, w/u revealed malignant ascites, cirrhotic liver (no mets noted), omental caking and peritoneal carcinomatosis.  Plan was to stop oxaliplatin and, if POD on f/u imaging, to change to FOLFIRI.    - discussed code status preferences as detailed above - full code    #Pain - post-op  - cont home oxy IR 5mg PO Q4hrs prn  - reviewed ISTOP - Reference #: [id number] - pt Rx'd oxy IR 5mg tabs and clonazepam 0.5mg tabs    #Anxiety  - cont home klonopin 0.5mg daily prn

## 2023-03-16 NOTE — DISCHARGE NOTE PROVIDER - CARE PROVIDER_API CALL
Sara López)  ColonRectal Surgery; Surgery  [address] [place] [address]  Phone: [phone]  Fax: [phone]  Follow Up Time: 2 weeks

## 2023-03-16 NOTE — ADVANCED PRACTICE NURSE CONSULT - RECOMMEDATIONS
Pouch change: 	  -Gently remove pouch water moistened gauze   -Cleanse stoma site with water moistened gauze, gently pat dry  -Measure stoma, currently [id number]  -Trace and cut current size on [place] 2 ¾” CeraPlus flat barrier (#29804)  -Stretch [place] Adapt CeraRing (#3083) onto back of barrier  -Apply barrier to patient's skin  -Attach Jaycee 2 ¾” drainable lock and roll pouch (#27176) onto barrier  -Empty pouch when 1/3 to no more than 1/2 full of effluent/flatus. Change pouching system q 3 - 4 days and prn for leaking. Next pouch change-    Plan of Care: Patient's d/c pending at this time. WOCN service to follow-up w/ patient on -  for pouch change and additional ostomy teaching. Questions or concerns or pouch w/ consistent leakage and unable to obtain seal, please consult WOCN. If pouch leaks after WOC service hours, please repouch using supplies and technique as indicated above and document where leakage occurred so system can be modified by WOCN if needed.
Please Reconsult WOCN s/p ostomy creation.  
Pouch change: 	  -Gently remove pouch water moistened gauze   -Cleanse stoma site with water moistened gauze, gently pat dry  -Measure stoma, currently   -Trace and cut current size on [place] 2 ¾” CeraPlus flat barrier (#95695)  -Stretch Jaycee Adapt CeraRing (#8405) onto back of barrier  -Apply barrier to patient's skin  -Attach Jaycee 2 ¾” drainable lock and roll pouch (#20636) OR Jaycee 2 ¼” drainable lock and roll pouch (#46927) onto barrier  -Empty pouch when 1/3 to no more than 1/2 full of effluent/flatus. Change pouching system q 3 - 4 days and prn for leaking.    Plan of Care: Patient's d/c pending at this time. WOCN service to follow-up w/ patient on -  for pouch change and additional ostomy teaching. Questions or concerns or pouch w/ consistent leakage and unable to obtain seal, please consult WOCN. If pouch leaks after WOC service hours, please repouch using supplies and technique as indicated above and document where leakage occurred so system can be modified by WOCN if needed.

## 2023-03-16 NOTE — PROGRESS NOTE ADULT - SUBJECTIVE AND OBJECTIVE BOX
INTERVAL HISTORY:  Pt seen lying in bed in NAD  She is eager to leave today  Denies pain or nausea or SOB.    PRESENT SYMPTOMS:     Dyspnea: No  Nausea/Vomiting: No  Anxiety:  No  Depression: No  Fatigue: No  Loss of appetite: No  Constipation: No    PAIN: denies            Character-            Duration-            Effect-            Factors-            Frequency-            Location-            Severity-    REVIEW OF SYSTEMS:   Full review of systems performed and was otherwise negative except as noted above.      MEDICATIONS  (STANDING):  acetaminophen     Tablet .. 975 milliGRAM(s) Oral every 8 hours  clonazePAM  Tablet 0.5 milliGRAM(s) Oral once  enoxaparin Injectable 40 milliGRAM(s) SubCutaneous every 24 hours  ibuprofen  Tablet. 600 milliGRAM(s) Oral every 8 hours  melatonin 5 milliGRAM(s) Oral at bedtime  potassium chloride   Powder 40 milliEquivalent(s) Oral daily  venlafaxine XR. 75 milliGRAM(s) Oral every 24 hours    MEDICATIONS  (PRN):  ondansetron Injectable 4 milliGRAM(s) IV Push every 4 hours PRN Nausea and/or Vomiting  oxyCODONE    IR 5 milliGRAM(s) Oral every 4 hours PRN Severe Pain (7 - 10)      PHYSICAL EXAM:  Vital Signs Last 24 Hrs  T(C): 36.9 (16 Mar 2023 09:00), Max: 37.1 (15 Mar 2023 23:41)  T(F): 98.4 (16 Mar 2023 09:00), Max: 98.8 (15 Mar 2023 23:41)  HR: 94 (16 Mar 2023 09:00) (86 - 98)  BP: 108/76 (16 Mar 2023 09:00) (93/63 - 108/76)  BP(mean): --  RR: 18 (16 Mar 2023 09:00) (18 - 18)  SpO2: 94% (16 Mar 2023 09:00) (92% - 97%)    Parameters below as of 16 Mar 2023 09:00  Patient On (Oxygen Delivery Method): room air      General: Pt lying in bed in [facility]  HEENT: NCAT; MMM  Resp: breathing unlabored; symmetric chest expansion B/L  MSK: no contractures/deformities  Skin: no rash  Neuro: awake and oriented x 3; no myoclonus  Psych: calm; appropriate speech and affect    LABS:                        8.3    10.79 )-----------( 64       ( 16 Mar 2023 05:26 )             24.4     03-16    135  |  104  |  7.5<L>  ----------------------------<  102<H>  4.2   |  22.0  |  0.41<L>    Ca    8.0<L>      16 Mar 2023 05:26  Phos  2.5     03-16  Mg     2.0     03-16          RADIOLOGY & ADDITIONAL STUDIES:    NEUROLOGIC MEDICATIONS/OPIOIDS/BENZODIAZEPINES IN PAST 24HRS:  acetaminophen     Tablet ..   975 milliGRAM(s) Oral (03-16-23 @ 05:53)   975 milliGRAM(s) Oral (03-15-23 @ 21:14)   975 milliGRAM(s) Oral (03-15-23 @ 13:44)    ibuprofen  Tablet.   600 milliGRAM(s) Oral (03-16-23 @ 05:53)   600 milliGRAM(s) Oral (03-15-23 @ 21:14)   600 milliGRAM(s) Oral (03-15-23 @ 13:44)    melatonin   5 milliGRAM(s) Oral (03-15-23 @ 23:31)    oxyCODONE    IR   5 milliGRAM(s) Oral (03-16-23 @ 09:22)   5 milliGRAM(s) Oral (03-15-23 @ 16:52)    venlafaxine XR.   75 milliGRAM(s) Oral (03-15-23 @ 21:15)        ADVANCE DIRECTIVES/TREATMENT PREFERENCES:  Code Status:  MOLST (if not Full Code):  HCP/Surrogate:  INTERVAL HISTORY:  Pt seen lying in bed in NAD  She is eager to leave today  Denies pain or nausea or SOB.    PRESENT SYMPTOMS:     Dyspnea: No  Nausea/Vomiting: No  Anxiety:  No  Depression: No  Fatigue: No  Loss of appetite: No  Constipation: No    PAIN: denies            Character-            Duration-            Effect-            Factors-            Frequency-            Location-            Severity-    REVIEW OF SYSTEMS:   Full review of systems performed and was otherwise negative except as noted above.    MEDICATIONS  (STANDING):  acetaminophen     Tablet .. 975 milliGRAM(s) Oral every 8 hours  clonazePAM  Tablet 0.5 milliGRAM(s) Oral once  enoxaparin Injectable 40 milliGRAM(s) SubCutaneous every 24 hours  ibuprofen  Tablet. 600 milliGRAM(s) Oral every 8 hours  melatonin 5 milliGRAM(s) Oral at bedtime  potassium chloride   Powder 40 milliEquivalent(s) Oral daily  venlafaxine XR. 75 milliGRAM(s) Oral every 24 hours    MEDICATIONS  (PRN):  ondansetron Injectable 4 milliGRAM(s) IV Push every 4 hours PRN Nausea and/or Vomiting  oxyCODONE    IR 5 milliGRAM(s) Oral every 4 hours PRN Severe Pain (7 - 10)    PHYSICAL EXAM:  Vital Signs Last 24 Hrs  T(C): 36.9 (16 Mar 2023 09:00), Max: 37.1 (15 Mar 2023 23:41)  T(F): 98.4 (16 Mar 2023 09:00), Max: 98.8 (15 Mar 2023 23:41)  HR: 94 (16 Mar 2023 09:00) (86 - 98)  BP: 108/76 (16 Mar 2023 09:00) (93/63 - 108/76)  BP(mean): --  RR: 18 (16 Mar 2023 09:00) (18 - 18)  SpO2: 94% (16 Mar 2023 09:00) (92% - 97%)    Parameters below as of 16 Mar 2023 09:00  Patient On (Oxygen Delivery Method): room air    General: Pt lying in bed in [facility]  HEENT: NCAT; MMM  Resp: breathing unlabored; symmetric chest expansion B/L  MSK: no contractures/deformities  Skin: no rash  Neuro: awake and oriented x 3; no myoclonus  Psych: calm; appropriate speech and affect    LABS:                        8.3    10.79 )-----------( 64       ( 16 Mar 2023 05:26 )             24.4     03-16    135  |  104  |  7.5<L>  ----------------------------<  102<H>  4.2   |  22.0  |  0.41<L>    Ca    8.0<L>      16 Mar 2023 05:26  Phos  2.5     03-16  Mg     2.0     03-16    RADIOLOGY & ADDITIONAL STUDIES:    NEUROLOGIC MEDICATIONS/OPIOIDS/BENZODIAZEPINES IN PAST 24HRS:  acetaminophen     Tablet ..   975 milliGRAM(s) Oral (03-16-23 @ 05:53)   975 milliGRAM(s) Oral (03-15-23 @ 21:14)   975 milliGRAM(s) Oral (03-15-23 @ 13:44)    ibuprofen  Tablet.   600 milliGRAM(s) Oral (03-16-23 @ 05:53)   600 milliGRAM(s) Oral (03-15-23 @ 21:14)   600 milliGRAM(s) Oral (03-15-23 @ 13:44)    melatonin   5 milliGRAM(s) Oral (03-15-23 @ 23:31)    oxyCODONE    IR   5 milliGRAM(s) Oral (03-16-23 @ 09:22)   5 milliGRAM(s) Oral (03-15-23 @ 16:52)    venlafaxine XR.   75 milliGRAM(s) Oral (03-15-23 @ 21:15)    ADVANCE DIRECTIVES/TREATMENT PREFERENCES:  Code Status: full code  GRISELDA (if not Full Code):  HCP/Surrogate:  Lenny

## 2023-03-16 NOTE — PHYSICAL THERAPY INITIAL EVALUATION ADULT - PERTINENT HX OF CURRENT PROBLEM, REHAB EVAL
Patient is a 57 year old female with hx of Anxiety and stage IV rectal cancer on FOLFOX chemotherapy sent in when monitoring CT showed large bowel obstruction 2/2 rectal mass. She is now s/p diverting colostomy.

## 2023-03-17 NOTE — PROGRESS NOTE ADULT - REASON FOR ADMISSION
obstructing rectal mass

## 2023-03-17 NOTE — PROGRESS NOTE ADULT - ATTENDING COMMENTS
Patient denies any acute complaints.  Tolerating diet, stoma functioning.  Pain present but controlled.  On exam abdomen soft, minimally distended (seems baseline), no rebound/guarding.  Ostomy beefy red and productive of soft stool and plenty of gas.  Labs unremarkable.    -- Ostomy care and education  -- Home health for stoma care upon discharge - discuss with case management  -- IS, ambulation, encourage OOB  -- Multimodal pain control  -- DC red rubber stoma bridge before discharge  -- Plan for discharge to home once resources arranged
Patient denies any significant complaints, notes pain at incision.  Not much appetite but denies N/V.  On exam abdomen soft, markedly less distended, less tympanic, no rebound/guarding.  Colostomy - mucosa at surface is dark however it is pink just below os.  Bag is full of gas and some liquid stool.  Leukocytosis, hypophosphatemia, hypomagnesemia noted.    -- Currently on clear liquids.  Re-evaluate later today, advance as tolerated if doing well.  Continue IV fluids until tolerating better PO.  -- Replace phosphorus and magnesium  -- IS, ambulation  -- Ostomy care and education.  Case management to setup of home health visits.  -- Stoma bridge in place - likely will be removed before discharge.  -- Monitor stoma output closely
Patient seen and examined. She is s/p colostomy creation for LBO from rectal cancer. She is tolerating diet and colostomy is functioning. replacing potassium. Needs ostomy teaching. Diet advanced. Discharge planning
Patient stayed overnight due to bloody-appearing BM via colostomy.  HGb rechecked and was stable, again checked this morning and continues to be stable.  Pain present but controlled.  Some serous drainage via midline.  Colostomy pink and perfused, stool in appliance.  Stoma bridge was removed.  Tolerating diet though still not much appetite.  LAbs reviewed - Hgb stable as above.  Patient may be discharged to home today with home care for ostomy.  Outpatient follow-up with Dr. López for postop check.

## 2023-03-17 NOTE — PROGRESS NOTE ADULT - PROVIDER SPECIALTY LIST ADULT
Colorectal Surgery
Palliative Care
Colorectal Surgery
Hospitalist
Hospitalist
Surgery
Colorectal Surgery
Colorectal Surgery

## 2023-03-17 NOTE — PROGRESS NOTE ADULT - NUTRITIONAL ASSESSMENT
This patient has been assessed with a concern for Malnutrition and has been determined to have a diagnosis/diagnoses of Severe protein-calorie malnutrition.    This patient is being managed with:   Diet Regular-  Entered: Mar 15 2023  9:53AM    
This patient has been assessed with a concern for Malnutrition and has been determined to have a diagnosis/diagnoses of Severe protein-calorie malnutrition.    This patient is being managed with:   Diet Regular-  Entered: Mar 15 2023  9:53AM    
This patient has been assessed with a concern for Malnutrition and has been determined to have a diagnosis/diagnoses of Severe protein-calorie malnutrition.    This patient is being managed with:   Diet Clear Liquid-  Entered: Mar 13 2023  6:39PM

## 2023-03-17 NOTE — PROGRESS NOTE ADULT - SUBJECTIVE AND OBJECTIVE BOX
Subjective: Patient seen and examined. During day yesterday had bloody BM. no hemodynamic changes or instability. Repeat labs stable and no additional bleeding noted. Tolerating diet. Pain well controlled. Ambulating and voiding. no new complaints. Denies fever chills NV HA CP SOB dizziness.           MEDICATIONS  (STANDING):  acetaminophen     Tablet .. 975 milliGRAM(s) Oral every 8 hours  heparin   Injectable 5000 Unit(s) SubCutaneous every 8 hours  melatonin 5 milliGRAM(s) Oral at bedtime  pantoprazole    Tablet 40 milliGRAM(s) Oral daily  potassium chloride   Powder 40 milliEquivalent(s) Oral daily  venlafaxine XR. 75 milliGRAM(s) Oral every 24 hours    MEDICATIONS  (PRN):  ondansetron Injectable 4 milliGRAM(s) IV Push every 4 hours PRN Nausea and/or Vomiting  oxyCODONE    IR 5 milliGRAM(s) Oral every 4 hours PRN Severe Pain (7 - 10)      Vital Signs Last 24 Hrs  T(C): 37 (16 Mar 2023 22:02), Max: 37.2 (16 Mar 2023 15:54)  T(F): 98.6 (16 Mar 2023 22:02), Max: 98.9 (16 Mar 2023 15:54)  HR: 86 (16 Mar 2023 22:02) (86 - 98)  BP: 131/85 (16 Mar 2023 22:02) (90/57 - 133/86)  BP(mean): --  RR: 18 (16 Mar 2023 22:02) (18 - 19)  SpO2: 97% (16 Mar 2023 22:02) (94% - 99%)    Parameters below as of 16 Mar 2023 19:43  Patient On (Oxygen Delivery Method): room air        Physical Exam:    Constitutional: NAD  HEENT: PERRL, EOMI  Neck: No JVD, FROM without pain  Respiratory: Breath Sounds equal & clear to auscultation, no accessory muscle use  Cardiovascular: Regular rate & rhythm, S1, S2  Gastrointestinal: Soft, non-tender, ostomy functioning well, stoma viable   Extremities: No peripheral edema, No cyanosis  Neurological: A&O x 3; without gross deficit, GCS: 15  Musculoskeletal: No joint pain, swelling, deformity, or point tenderness; no limitation of movement      LABS:                        8.3    12.65 )-----------( 75       ( 16 Mar 2023 14:55 )             25.1     03-16    135  |  104  |  7.5<L>  ----------------------------<  102<H>  4.2   |  22.0  |  0.41<L>    Ca    8.0<L>      16 Mar 2023 05:26  Phos  2.5     03-16  Mg     2.0     03-16      PT/INR - ( 16 Mar 2023 14:55 )   PT: 12.6 sec;   INR: 1.09 ratio         PTT - ( 16 Mar 2023 14:55 )  PTT:33.4 sec     Subjective: Patient seen and examined. During day yesterday had bloody BM. no hemodynamic changes or instability. Repeat labs stable and no additional bleeding noted. Tolerating diet. Pain well controlled. Ambulating and voiding. no new complaints. Nausea overnight but no vomiting.  Denies fever chills  HA CP SOB dizziness.           MEDICATIONS  (STANDING):  acetaminophen     Tablet .. 975 milliGRAM(s) Oral every 8 hours  heparin   Injectable 5000 Unit(s) SubCutaneous every 8 hours  melatonin 5 milliGRAM(s) Oral at bedtime  pantoprazole    Tablet 40 milliGRAM(s) Oral daily  potassium chloride   Powder 40 milliEquivalent(s) Oral daily  venlafaxine XR. 75 milliGRAM(s) Oral every 24 hours    MEDICATIONS  (PRN):  ondansetron Injectable 4 milliGRAM(s) IV Push every 4 hours PRN Nausea and/or Vomiting  oxyCODONE    IR 5 milliGRAM(s) Oral every 4 hours PRN Severe Pain (7 - 10)      Vital Signs Last 24 Hrs  T(C): 37 (16 Mar 2023 22:02), Max: 37.2 (16 Mar 2023 15:54)  T(F): 98.6 (16 Mar 2023 22:02), Max: 98.9 (16 Mar 2023 15:54)  HR: 86 (16 Mar 2023 22:02) (86 - 98)  BP: 131/85 (16 Mar 2023 22:02) (90/57 - 133/86)  BP(mean): --  RR: 18 (16 Mar 2023 22:02) (18 - 19)  SpO2: 97% (16 Mar 2023 22:02) (94% - 99%)    Parameters below as of 16 Mar 2023 19:43  Patient On (Oxygen Delivery Method): room air        Physical Exam:    Constitutional: NAD  HEENT: PERRL, EOMI  Neck: No JVD, FROM without pain  Respiratory: Breath Sounds equal & clear to auscultation, no accessory muscle use  Cardiovascular: Regular rate & rhythm, S1, S2  Gastrointestinal: Soft, non-tender, ostomy functioning well, stoma viable , tympanic and significantly but softly distended   Extremities: No peripheral edema, No cyanosis  Neurological: A&O x 3; without gross deficit, GCS: 15  Musculoskeletal: No joint pain, swelling, deformity, or point tenderness; no limitation of movement      LABS:                        8.3    12.65 )-----------( 75       ( 16 Mar 2023 14:55 )             25.1     03-16    135  |  104  |  7.5<L>  ----------------------------<  102<H>  4.2   |  22.0  |  0.41<L>    Ca    8.0<L>      16 Mar 2023 05:26  Phos  2.5     03-16  Mg     2.0     03-16      PT/INR - ( 16 Mar 2023 14:55 )   PT: 12.6 sec;   INR: 1.09 ratio         PTT - ( 16 Mar 2023 14:55 )  PTT:33.4 sec

## 2023-03-17 NOTE — PROGRESS NOTE ADULT - ASSESSMENT
57 year old female with hx of Anxiety and stage IV rectal cancer on FOLFOX chemotherapy sent in when monitoring CT showed large bowel obstruction 2/2 rectal mass now s/p diverting colostomy. Patient tolerated procedure without issues. hemodynamically well and stable    Plan  - L fiber diet and tolerating   -bloody BM without change in hemodynamics or lab values will continue to monitor for additional bleeding and intervene as appropriate  - Multi modral pain control  - IV hydration PRN  -AM labs, will replete as needed.   - Anti-emetics for nausea  -  ostomy teaching performed  - PT evaluation   - dispo planning for home with home care    57 year old female with hx of Anxiety and stage IV rectal cancer on FOLFOX chemotherapy sent in when monitoring CT showed large bowel obstruction 2/2 rectal mass now s/p diverting colostomy. Patient tolerated procedure without issues. hemodynamically well and stable    Plan  - L fiber diet, would monitor tolerance and abd exam   -bloody BM without change in hemodynamics or lab values will continue to monitor for additional bleeding and intervene as appropriate  - Multi modral pain control  - IV hydration PRN  -AM labs, will replete as needed.   - Anti-emetics for nausea  -  ostomy teaching performed  - PT evaluation   - dispo planning for home with home care

## 2023-03-19 PROBLEM — G89.18 POST-OPERATIVE PAIN: Status: ACTIVE | Noted: 2023-01-01

## 2023-03-19 PROBLEM — Z48.89: Status: ACTIVE | Noted: 2023-01-01

## 2023-03-19 NOTE — ASSESSMENT
[FreeTextEntry1] : 57yoF; with PMH signif for Stage IV Rectal CA (s/p resection, on FOLFOX chemo, now recently s/p diverting colostomy last week); now p/w slight drainage from incision site which is oozing.\par -wound site appears intact with no dehiscence, no active bleeding: recommended to apply pressure dressing, if bleeding soaks dressing  seek immediate attention in ER\par -pain control:  motrin/tylenol prn, will reach out to [facility] to see if they can add on the oxycodone\par -seek immediate attention for active bleeding, fever, vomiting, intractable pain or any other acute causes of concern.

## 2023-03-19 NOTE — HISTORY OF PRESENT ILLNESS
[Home] : at home, [unfilled] , at the time of the visit. [Other Location: e.g. Home (Enter Location, City,State)___] : at [unfilled] [Verbal consent obtained from patient] : the patient, [unfilled] [Family Member] : family member [FreeTextEntry8] : 57yoF; with PMH signif for Stage IV Rectal CA (s/p resection, on FOLFOX chemo, now recently s/p diverting colostomy last week); now p/w slight drainage from incision site which is oozing. also c/o mild pain over rectal area. denies rectal bleeding. denies f/c/s. denies abd pain. denies n/v.  reports they also had questions about potassium dosing and regarding oxycodone prescription.  states she took motrin for mild pain earlier today and is feeling well now. daughter just noticed trace oozing from abd suture site earlier in the day when patient attempted to ambulate, but states the oozing has stopped now.\par PMH: rectal ca\par

## 2023-03-19 NOTE — REVIEW OF SYSTEMS
[Fever] : no fever [Chills] : no chills [Fatigue] : no fatigue [Chest Pain] : no chest pain [Cough] : no cough [Abdominal Pain] : no abdominal pain [Nausea] : no nausea [Vomiting] : no vomiting [de-identified] : trace bleeding from surgical incision

## 2023-03-19 NOTE — PLAN
[With new medications prescribed] : Treat in place: with new medications prescribed [FreeTextEntry1] : 57yoF; with PMH signif for Stage IV Rectal CA (s/p resection, on FOLFOX chemo, now recently s/p diverting colostomy last week); now p/w slight drainage from incision site which is oozing.\par -wound site appears intact with no dehiscence, no active bleeding: recommended to apply pressure dressing, if bleeding soaks dressing, seek immediate attention in ER\par -pain control:  motrin/tylenol prn, will reach out to [facility] to see if they can add on the oxycodone\par -seek immediate attention for active bleeding, fever, vomiting, intractable pain or any other acute causes of concern.

## 2023-03-19 NOTE — PHYSICAL EXAM
[No Acute Distress] : no acute distress [No Respiratory Distress] : no respiratory distress  [Soft] : abdomen soft [Non Tender] : non-tender [de-identified] : EXAM(by patient daughter during video call): NAD, abd--dry blood over incision site, nt abdomen, no rebound, no erythema around colostomy

## 2023-03-20 PROBLEM — T67.01XA: Status: ACTIVE | Noted: 2023-01-01

## 2023-03-20 NOTE — ASSESSMENT
[FreeTextEntry1] : Fever versus mild hyperthermia in the context of sleeping under an electric blanket.  Seems more likely to be the latter given overall improvement of all symptoms, and lack of any identifiable source of infection on exam today, as well as immediate improvement of temperature after discontinuing the electric blanket.  Patient is afebrile on exam, has not received antipyretics, and is well-appearing.  Long discussion with family about potential treatment plans.  Given that she had a temperature over 101 and recent surgery I advised that it is impossible to rule out infection at this time and most prudent course of action would be evaluation in person in the ED.  Shared decision made to defer transport to the ED at this time and instead monitor her closely for any recurrent fever or any new symptoms (malaise, cough, shortness of breath, abdominal pain, etc).  This is a reasonable alternative, and patient's  and daughter were counseled at length regarding symptoms that would necessitate in person evaluation tonight.

## 2023-03-20 NOTE — HISTORY OF PRESENT ILLNESS
[Home] : at home, [unfilled] , at the time of the visit. [Other Location: e.g. Home (Enter Location, City,State)___] : at [unfilled] [Spouse] : spouse [Family Member] : family member [Verbal consent obtained from patient] : the patient, [unfilled] [FreeTextEntry8] : Kadi (daughter) assisting visit\par PMH signif for Stage IV Rectal CA (s/p resection, had been on FOLFOX chemo, now recently s/p diverting colostomy 3/13) now with fever 101.5 today.  Had been under a heated blanket at the time, family states she felt warm, now that the heated blanket is off.  Notes some fatigue but no more so than usual.  No other symptoms at all.  Improved drainage from wound addressed at visit 3/18/23.  No n/v.  +stool in ostomy, nonbloody, same consistency .  Tolerating PO.  No CP, sob, back pain, new/worsening abd pain, cough, congestion, facial pain, HA, neck pain, rash. \par  No LE swelling.  Overall slightly less fatigued and more energy today.  Has a pre-existing bed sore that is stated to look better today, managed by wound care RN\par Labs 3/17 noted.  Mildly uptrending WBC (12-->15) but c/w prior recent labs.  \par \par Exam -- patient fatigued, thin, appropriate, arouses to questions and NAD.  Anterior surgical wound with ostomy in place, cdi, no obvious discharge.  Palpation of abdomen performed by daughter does not reproduce tenderness\par Temp 96.9 (PO) and 98.9 (IR) while on call with me.

## 2023-03-20 NOTE — PHYSICAL EXAM
[Normal Sclera/Conjunctiva] : normal sclera/conjunctiva [Normal Outer Ear/Nose] : the outer ears and nose were normal in appearance [No Respiratory Distress] : no respiratory distress  [Non-distended] : non-distended [No Joint Swelling] : no joint swelling [No Rash] : no rash [Normal Affect] : the affect was normal [Normal Insight/Judgement] : insight and judgment were intact

## 2023-03-23 NOTE — CHART NOTE - NSCHARTNOTEFT_GEN_A_CORE
Colorectal Update    Patient assessed secondary to nurse concern that ostomy output was bloody. Colorectal team assessed patient and she is complaining of light headedness. She just had lunch and is tolerating well. Ostomy content is in pink bin and looks grossly sanginous. She is oriented times 4. Family is at bedside.     ICU Vital Signs Last 24 Hrs  T(C): 36.8 (16 Mar 2023 13:28), Max: 37.1 (15 Mar 2023 23:41)  T(F): 98.3 (16 Mar 2023 13:28), Max: 98.8 (15 Mar 2023 23:41)  HR: 95 (16 Mar 2023 13:28) (86 - 98)  BP: 96/65 (16 Mar 2023 13:30) (90/57 - 108/76)  BP(mean): --  ABP: --  ABP(mean): --  RR: 18 (16 Mar 2023 13:28) (18 - 18)  SpO2: 98% (16 Mar 2023 13:28) (92% - 98%)    O2 Parameters below as of 16 Mar 2023 09:00  Patient On (Oxygen Delivery Method): room air        PE  General: resting comfortably in bed and NAD.   Pulm: no increased breathing. No use of accessory muscles   Abdomen: pink ostomy with ss output. midline incision with ecchymoses but c/d/i and incision well approximated.     Plan   - Stat labs icluding CBC, BMP, and coag pannel   - Monitor VS  - Reassess once labs are back  - Remove D/C order for now, pending labs.  - Cuco to see in PM.
[facility] Program Note:    Initial Screening    Date of Initial Screening: 3/23/23    Patient qualifies for Food As [facility] Program  [ x ] Patient qualifies for Food As [facility] Program w/ health condition  [  ] Patient does not qualify for Food As [facility] Program w/ no health conditions    Diagnosis that qualifies patient for program  [  ] Hypertension  [  ] Diabetes  [  ] Unintended weight loss  [  ] Obesity  [  ] CHF  [ x ] Other    Additional Comments: cancer w/chemo, colostomy          Current Patient Diet: Regular Diet      Actions Taken:  [  ] Spoke with patient  [ x ] [employer] survey completed  Additional Comments: Community resources provided through [employer], as well as assistance with SNAP application. Left message with patient to complete initial consultation for [facility] Program.          Non-qualification for Food [facility] Program  [   ] D/C to [facility]  [   ] Referred to Social Work  [  ] Declined [facility] Program  [ x  ] D/C Before Seen By RD  Participant Phone Number:  JOSE DE JESUS Comments:              Discharge Visit  [  ] Initial Screening already completed    Date of D/C:  [  ]  Patient provided with healthy groceries  [  ] Follow Up appointment made  [ x ] Other community outreach given  [x  ] Help with SNAP application at F/U appointment  [ x ] Patient D/C while RD was unavailable. Will call to schedule pick-up
Colorectal Surgery   Post op Check     Patient evaluated at bedside. Resting comfortably. Pleasant. Pain responsive to pain medication.     Vital Signs Last 24 Hrs  T(C): 36.6 (13 Mar 2023 20:00), Max: 37.1 (13 Mar 2023 11:41)  T(F): 97.9 (13 Mar 2023 20:00), Max: 98.7 (13 Mar 2023 11:41)  HR: 93 (13 Mar 2023 22:00) (72 - 93)  BP: 113/84 (13 Mar 2023 22:00) (101/79 - 130/89)  BP(mean): 94 (13 Mar 2023 22:00) (88 - 98)  RR: 13 (13 Mar 2023 22:00) (10 - 18)  SpO2: 99% (13 Mar 2023 22:00) (95% - 100%)    Parameters below as of 13 Mar 2023 22:00  Patient On (Oxygen Delivery Method): nasal cannula  O2 Flow (L/min): 3      Parameters below as of 13 Mar 2023 20:00  Patient On (Oxygen Delivery Method): room air    PE  General: sleeping in bed   Abdomen: Ostomy in place with bag and ss fluid. Leaking on medial side. Reinforced with 4 x 4  : aldrich in place. Clear urine     MEDICATIONS  (STANDING):  dextrose 5% + lactated ringers 1000 milliLiter(s) (100 mL/Hr) IV Continuous <Continuous>  venlafaxine XR. 75 milliGRAM(s) Oral every 24 hours    MEDICATIONS  (PRN):  clonazePAM  Tablet 0.5 milliGRAM(s) Oral every 8 hours PRN anxiety  ketorolac   Injectable 15 milliGRAM(s) IV Push every 6 hours PRN Moderate Pain (4 - 6)  ondansetron Injectable 4 milliGRAM(s) IV Push every 4 hours PRN Nausea and/or Vomiting      Plan  - AM labs   - Monitor vitals  -STrict I's and Os  - Multimodal pain control   - Remainder of plan pending clinical course

## 2023-04-07 NOTE — ED PROVIDER NOTE - PHYSICAL EXAMINATION
General: Somnolent, arouses to physical stimuli. Lying in bed in NAD. Cachectic  HEENT: Normocephalic, atraumatic. No scleral icterus or conjunctival injection. EOMI. Moist mucous membranes. Oropharynx clear.   Neck:. Soft and supple.  Cardiac: Tachy but regular, +S1/S2. No murmurs, rubs, gallops. Peripheral pulses 2+ and symmetric. No LE edema.  Resp: Lungs CTAB. Speaking in full sentences. No accessory muscle use  Abd: Soft, non-tender, distended. No guarding, rebound, or rigidity.  Back: Spine midline and non-tender. No CVA tenderness.    Skin: No rashes, abrasions, or lacerations.  Neuro: Somnolent, not following commands. Moves all extremities symmetrically. Motor strength and sensation grossly intact.

## 2023-04-07 NOTE — ED ADULT TRIAGE NOTE - CHIEF COMPLAINT QUOTE
sent in for eval of "white leaking puss" from colostomy site. as per ems, colostomy is "a newer thing for her." has stage IV colon cancer and is on hospice care. lethargic and able to state name. follows basic commands. at baseline as per ems.

## 2023-04-07 NOTE — ED ADULT TRIAGE NOTE - SOURCE OF INFORMATION
Kevin Spears  1968    Medication:   HYDROcodone-acetaminophen (NORCO) 5-325 MG per tablet    tiZANidine (ZANAFLEX) 4 MG tablet  Provider: Dewayne Wang MD  Preferred Contact Number: [phone] (mobile)    Pharmacy:  [facility]    Patient instructed to call pharmacy directly for future refills.      Advised patient that the nurse will call if there are questions or concerns, otherwise refill processing may take 48-72 hours.      EMS

## 2023-04-07 NOTE — ED PROVIDER NOTE - ATTENDING CONTRIBUTION TO CARE
57F hx stage IV colorectal CA s/p recent diverting colostomy sent in by home nurse  for evaluation of ostomy site; + fatigue,  +weakness;pe arousable to voice; heent ncat mucosa dry neck supple cor s1s2 lung clear abd ostomy site neuro nonfocal   dx ostomy evaluation; surgery eval 57F hx stage IV colorectal CA s/p recent diverting colostomy sent in by home nurse  for evaluation of ostomy site; + fatigue,  +weakness;pe arousable to voice; heent ncat mucosa dry neck supple cor s1s2 lung clear abd ostomy site pink ; no erythema ; + output;  neuro nonfocal   dx ostomy evaluation; surgery eval

## 2023-04-07 NOTE — ED PROVIDER NOTE - NS ED ROS FT
General: Denies fever, chills  HEENT: Denies sore throat  Neck: Denies neck pain  Resp: Denies coughing, SOB  Cardiovascular: Denies CP, palpitations, LE edema  GI: Denies nausea, vomiting, abdominal pain, diarrhea, constipation, blood in stool  : Denies dysuria, hematuria, frequency, incontinence  MSK: Denies back pain  Neuro: Generalized weakness. Denies HA, dizziness, numbness  Skin: Ostomy discharge. Denies rashes.

## 2023-04-07 NOTE — ED PROVIDER NOTE - OBJECTIVE STATEMENT
57F hx stage IV colorectal CA s/p recent diverting colostomy presents for evaluation of ostomy site with associated worsening fatigue over the past 3 weeks. Daughter at bedside provides hx as pt is lethargic, falling asleep, which daughter attributes to her morphine tablet she took PTA for abd pain. She states pt is on home hospice, but remains full code so is able to receive treatment at this time. She was recently evaluated by her home ostomy nurse who was concerned as the ostomy site has had white discharge and seemed more sunken. Daughter also states pt has seemed more fatigued over the past 3 weeks following discharged, previous was alert and talkative, now spending most of the day sleeping. She was recently started on PO Keflex for the ostomy discharge 3 days ago. Otherwise no recent fever, chills, N/V, abd pain, dizziness.

## 2023-04-07 NOTE — ED ADULT NURSE REASSESSMENT NOTE - NS ED NURSE REASSESS COMMENT FT1
Assumed care of pt at 19:15 as stated in report from RN faina pineda. Charting as noted. Patient A&O x4, denies pain/discomfort, denies CP/SOB. Updated on the plan of care. Call bell within reach, bed locked in lowest position. IV site flushed w/ NS. No redness, swelling or pain noted to site. No signs of acute distress noted, safety maintained. surgery consult pending, pt colostomy putting out white discharge. Pt family asking about palliative, will escalate.

## 2023-04-07 NOTE — ED PROVIDER NOTE - CLINICAL SUMMARY MEDICAL DECISION MAKING FREE TEXT BOX
57F hx metastatic colorectal CA s/p recent diverting colostomy presents for evaluation of discharge from ostomy site and worsening generalized weakness. On exam pt with scant white discharge around ostomy site with pink mucosa. Given recent ostomy placement, will consult colorectal surgery. Labs, IVF, BCx, abx

## 2023-04-08 NOTE — PATIENT PROFILE ADULT - FALL HARM RISK - HARM RISK INTERVENTIONS

## 2023-04-08 NOTE — CHART NOTE - NSCHARTNOTEFT_GEN_A_CORE
Pain not controlled on current regimen, discussed with family at bedside in agreement with adding Morphine 0.5mg IVP for BTP – order placed.

## 2023-04-08 NOTE — CONSULT NOTE ADULT - NS ATTEND AMEND GEN_ALL_CORE FT
Patient seen and examined.  Cachectic woman with terminal cancer with large bowel obstruction requiring colostomy.  Malignant ascites. Abdominal exam revealed tenderness.  Can be evaluated by diagnostic paracentesis by IR.  Can be started on antibiotics if there is a concern for peritonitis.

## 2023-04-08 NOTE — CONSULT NOTE ADULT - SUBJECTIVE AND OBJECTIVE BOX
COLORECTAL SURGERY CONSULT NOTE  --------------------------------------------------------------------------------------------    HPI: Patient is a 57y old  Female who presents with a chief complaint of AMS. Patient accompanied by family member who provides most of history. Patient had recently been admitted to [facility] for obstructing rectal mass and diverting ostomy creation, subsequently IA'ed on 3/16. She was initially doing fine at home but after a few days, she having cognitive decline demonstrating reduced PO intake and reduced conversation. She has an ostomy nurse who has seen her a couple of times since these symptoms started and noticed some drainage around the ostomy from the lower abdominal wound. No recorded fevers. Family states she had a low grade 99F fever.      PAST MEDICAL & SURGICAL HISTORY:  Rectal cancer        FAMILY HISTORY:  [] Family history not pertinent as reviewed with the patient and family    SOCIAL HISTORY:      ALLERGIES: No Known Allergies      HOME MEDICATIONS:     CURRENT MEDICATIONS  MEDICATIONS (STANDING): cefepime  Injectable. 1000 milliGRAM(s) IV Push every 12 hours    MEDICATIONS (PRN):  --------------------------------------------------------------------------------------------    Vitals:   T(C): 36.9 (04-08-23 @ 00:31), Max: 36.9 (04-08-23 @ 00:31)  HR: 100 (04-08-23 @ 00:31) (100 - 109)  BP: 100/78 (04-08-23 @ 00:31) (94/61 - 100/78)  RR: 18 (04-08-23 @ 00:31) (16 - 18)  SpO2: 96% (04-08-23 @ 00:31) (95% - 96%)  CAPILLARY BLOOD GLUCOSE          Height (cm): 157.5 (04-07 @ 16:08)      PHYSICAL EXAM:   General: cachetic, nontoxic appearing woman  Neuro: A+Ox2  HEENT: NC/AT  Resp: Good effort, no increased WOB  GI/Abd: Soft, nondistended, tender in L abdomen, no rebound or guarding, Ostomy with sunken appearance but pink and viable, Incision with partial dehiscence and fibrinous exudate drainage.  Vascular: All 4 extremities warm.  Skin: Intact, no breakdown    --------------------------------------------------------------------------------------------    LABS  CBC (04-07 @ 17:55)                              9.4<L>                         8.11    )----------------(  175        61.0  % Neutrophils, 25.6  % Lymphocytes, ANC: 4.93                                29.7<L>    BMP (04-07 @ 20:15)             125<L>  |  90<L>   |  15.6  		Ca++ --      Ca 8.3<L>             ---------------------------------( 110<H>		Mg --                 4.6     |  27.0    |  0.32<L>			Ph --        LFTs (04-07 @ 20:15)      TPro 5.4<L> / Alb 2.5<L> / TBili 0.6 / DBili -- / AST 11 / ALT <5 / AlkPhos 92    VBG (04-07 @ 17:55)     -- / -- / -- / -- / -- / --%     Lactate: 1.30    --------------------------------------------------------------------------------------------    IMAGING  < from: CT Abdomen and Pelvis w/ IV Cont (04.07.23 @ 21:55) >  FINDINGS:  CHEST:  LUNGS AND LARGE AIRWAYS: Patent central airways. Bilateral lower lobe   compressive atelectasis adjacent to pleural effusions.  PLEURA: Small bilateral pleural effusions.  VESSELS: Within normal limits.  HEART: Heart size is normal. No pericardial effusion.  MEDIASTINUM AND HALEY: No lymphadenopathy.  CHEST WALL AND LOWER NECK: Right anterior chest wall Mediport with   catheter tip in the right atrium diffuse cachexia.    ABDOMEN AND PELVIS:  LIVER: Mildly scalloped appearance of the liver.  BILE DUCTS: Normal caliber.  GALLBLADDER: Biliary sludge.  SPLEEN: Prominent spleen.  PANCREAS: Within normal limits.  ADRENALS: Within normal limits.  KIDNEYS/URETERS: Within normal limits.    BLADDER: Within normal limits.  REPRODUCTIVE ORGANS: Nodular thickening of the cervix and vagina.   Heterogeneous enhancement of the left posterior uterine body.    BOWEL: Diffuse masslike thickening of the rectum in keeping with the   history of rectal mass. Left lower quadrant diverting colostomy. Diffuse   irregular wall thickening of the colon and small bowel.  PERITONEUM: Large volume ascites. Extensive omental caking with diffuse   serosal involvement of large and small bowel compatible with peritoneal   carcinomatosis, progressed since prior study.  VESSELS: Within normal limits.  RETROPERITONEUM/LYMPH NODES: No lymphadenopathy.  ABDOMINAL WALL: Diffusely cachectic..  BONES: Mild degenerative change of the spine.    IMPRESSION:  Small bilateral pleural effusions.    Large volume ascites with diffuse peritoneal carcinomatosis, progressed   since prior study. Severe diffuse colonic and small bowel serosal   involvement.    No bowel obstruction.    Nodular thickening of the cervix and upper vagina. Cannot exclude direct   extension of tumor involvement from patient's primary rectal mass.    < end of copied text >      --------------------------------------------------------------------------------------------

## 2023-04-08 NOTE — H&P ADULT - NSHPPHYSICALEXAM_GEN_ALL_CORE
T(C): 36.6 (04-08-23 @ 05:04), Max: 36.9 (04-08-23 @ 00:31)  HR: 108 (04-08-23 @ 05:04) (100 - 109)  BP: 97/69 (04-08-23 @ 05:04) (94/61 - 100/78)  RR: 18 (04-08-23 @ 05:04) (16 - 18)  SpO2: 97% (04-08-23 @ 05:04) (95% - 97%)    GEN - NAD  HEENT - NCAT, EOMI, RAMON,   RESP - CTA BL, no wheeze/rhonchi/crackles. not on supplemental O2.  CARDIO - NS1S2, RRR. No murmurs  ABD - Soft/ASCITIES/Non tender. . Normal BS x4 quadrants.   Ext - No LAMIN.   MSK -NO JOINTS SWELLING  Neuro - Awake. oriented to self and place. moving exts, not following commands..    Psych- calm,confused

## 2023-04-08 NOTE — CONSULT NOTE ADULT - SUBJECTIVE AND OBJECTIVE BOX
Patient is a 57y old  Female who presents with a chief complaint of AMS (08 Apr 2023 11:29)      HPI:  56yo F w/ PMH of stage IV rectal CA dx'd in 8/2022 on FOLFOX who underwent  colectomy with diverting colostomy (with pre-op paracentesis). s 3/13 without complication. sec to  colonic obstruction 2/2 rectal mass.  Pt has recurrent ascites for which she has undergone prior large volume paracenteses.  Pt was seen by GI to eval for possible colonic stent but they felt high risk for perforation came with AMS, poor po intake,low grade fever.Pt lives with family, was on home hospice but per family, pt is waiting for further treatment plan with hem/onc when physically more stable.  Long dw Surrogate/HCP/Guardian: Phone#: spouse Lenny Cruz - [place] [phone]  [home  [phone]] and daughter--per family ,pt was okay 2-3 days after discharged from hospital the her mental status and po intake declined as well as her activities. She was on home hospice. Family spoke with  and rec for inpatient  eval. Family noticed some discharge from colostomy side.Pt was on po abx at home per hospice.     Pt is a poor historian,lethagic.denies any pain. Known her name and place.      GI consulted for evaluation of ascites.         PMH  -stage IV rectal CA dx'd in 8/2022 on FOLFOX who underwent   colectomy with diverting colostomy (with pre-op paracentesis). s 3/13 without complication.sec to  colonic obstruction 2/2 rectal mass.  Anxiety  hypotension    FH-dm  Social history-remote smoker, social drinking/no illicit drugs.        REVIEW OF SYSTEMS:  Constitutional: See HPI   ENMT:  No difficulty hearing, tinnitus, vertigo; No sinus or throat pain  Respiratory: No cough, wheezing, chills or hemoptysis  Cardiovascular: No chest pain, palpitations, dizziness or leg swelling  Gastrointestinal: See hpi  Skin: No itching, burning, rashes or lesions   Musculoskeletal: No joint pain or swelling; No muscle, back or extremity pain    PAST MEDICAL & SURGICAL HISTORY:  Rectal cancer          FAMILY HISTORY:      SOCIAL HISTORY:  Smoking Status: [ ] Current, [ ] Former, [ ] Never  Pack Years:  [  ] EtOH  [  ] IVDA    MEDICATIONS:  MEDICATIONS  (STANDING):  dextrose 5% + sodium chloride 0.9%. 1000 milliLiter(s) (80 mL/Hr) IV Continuous <Continuous>  heparin   Injectable 5000 Unit(s) SubCutaneous every 12 hours  pantoprazole    Tablet 40 milliGRAM(s) Oral before breakfast    MEDICATIONS  (PRN):  acetaminophen     Tablet .. 650 milliGRAM(s) Oral every 6 hours PRN Temp greater or equal to 38C (100.4F), Mild Pain (1 - 3)  aluminum hydroxide/magnesium hydroxide/simethicone Suspension 30 milliLiter(s) Oral every 4 hours PRN Dyspepsia  melatonin 3 milliGRAM(s) Oral at bedtime PRN Insomnia  morphine  - Injectable 1 milliGRAM(s) IV Push every 4 hours PRN Severe Pain (7 - 10)  morphine  - Injectable 0.5 milliGRAM(s) IV Push every 4 hours PRN Moderate Pain (4 - 6)  ondansetron Injectable 4 milliGRAM(s) IV Push every 8 hours PRN Nausea and/or Vomiting      Allergies    No Known Allergies    Intolerances        Vital Signs Last 24 Hrs  T(C): 36.6 (08 Apr 2023 11:50), Max: 36.9 (08 Apr 2023 00:31)  T(F): 97.9 (08 Apr 2023 11:50), Max: 98.4 (08 Apr 2023 00:31)  HR: 96 (08 Apr 2023 11:50) (96 - 109)  BP: 89/64 (08 Apr 2023 11:50) (89/64 - 101/69)  BP(mean): --  RR: 18 (08 Apr 2023 11:50) (16 - 18)  SpO2: 100% (08 Apr 2023 11:50) (95% - 100%)    Parameters below as of 08 Apr 2023 11:50  Patient On (Oxygen Delivery Method): nasal cannula  O2 Flow (L/min): 2          PHYSICAL EXAM:    General: cachetic, nontoxic appearing woman  Neuro: A+Ox2   HEENT: NC/AT  Resp: Good effort, no increased WOB  GI/Abd: Soft, nondistended, tender in L abdomen, no rebound or guarding, Ostomy noted, small amount of drainage.   Vascular: All 4 extremities warm.  Skin: Intact, no breakdown      LABS:                        9.0    6.49  )-----------( 319      ( 08 Apr 2023 09:12 )             28.5             RADIOLOGY & ADDITIONAL STUDIES:           < from: CT Abdomen and Pelvis w/ IV Cont (04.07.23 @ 21:55) >    ACC: [id number] EXAM:  CT CHEST IC   ORDERED BY: CANDIS ACKERMAN     ACC: [id number] EXAM:  CT ABDOMEN AND PELVIS IC   ORDERED BY: MALINI STEWART     PROCEDURE DATE:  04/07/2023          INTERPRETATION:  CLINICAL INFORMATION: Colon cancer. Abdominal distention.    COMPARISON: CT chest abdomen pelvis 3/10/2023.    CONTRAST/COMPLICATIONS:  IV Contrast: IV contrast documented in unlinked concurrent exam   (accession [id number]), Omnipaque 350 (accession [id number])  75 cc   administered   25 cc discarded  Oral Contrast: NONE  Complications: None reported at time of study completion    PROCEDURE:  CT of the Chest, Abdomen and Pelvis was performed.  Sagittal and coronal reformats were performed.    FINDINGS:  CHEST:  LUNGS AND LARGE AIRWAYS: Patent central airways. Bilateral lower lobe   compressive atelectasis adjacent to pleural effusions.  PLEURA: Small bilateral pleural effusions.  VESSELS: Within normal limits.  HEART: Heart size is normal. No pericardial effusion.  MEDIASTINUM AND HALEY: No lymphadenopathy.  CHEST WALL AND LOWER NECK: Right anterior chest wall Mediport with   catheter tip in the right atrium diffuse cachexia.    ABDOMEN AND PELVIS:  LIVER: Mildly scalloped appearance of the liver.  BILE DUCTS: Normal caliber.  GALLBLADDER: Biliary sludge.  SPLEEN: Prominent spleen.  PANCREAS: Within normal limits.  ADRENALS: Within normal limits.  KIDNEYS/URETERS: Within normal limits.    BLADDER: Within normal limits.  REPRODUCTIVE ORGANS: Nodular thickening of the cervix and vagina.   Heterogeneous enhancement of the left posterior uterine body.    BOWEL: Diffuse masslike thickening of the rectum in keeping with the   history of rectal mass. Left lower quadrant diverting colostomy. Diffuse   irregular wall thickening of the colon and small bowel.  PERITONEUM: Large volume ascites. Extensive omental caking with diffuse   serosal involvement of large and small bowel compatible with peritoneal   carcinomatosis, progressed since prior study.  VESSELS: Within normal limits.  RETROPERITONEUM/LYMPH NODES: No lymphadenopathy.  ABDOMINAL WALL: Diffusely cachectic..  BONES: Mild degenerative change of the spine.    IMPRESSION:  Small bilateral pleural effusions.    Large volume ascites with diffuse peritoneal carcinomatosis, progressed   since prior study. Severe diffuse colonic and small bowel serosal   involvement.    No bowel obstruction.    Nodular thickening of the cervix and upper vagina. Cannot exclude direct   extension of tumor involvement from patient's primary rectal mass.    --- End of Report ---            ELISA MONTANA MD; Attending Radiologist  This document has been electronically signed. Apr 7 2023 11:07PM    < end of copied text >

## 2023-04-08 NOTE — GOALS OF CARE CONVERSATION - ADVANCED CARE PLANNING - CONVERSATION DETAILS
Met with  and children at bedside. Per recent discussions with staff, they were advised to sign MOLST and are open to filling out the MOLST for DNR and DNI. Upon further discussion, the agree that they want to focus on the patient's pain and discomfort. They decide to make patient comfort measures as well. No antibiotics, no feeding tube, no IV fluids, no escalation of care, no vital signs, no tests/labs/blood draws. They are interested in home hospice as they believe the patient would want to be home. Explained that its likely based on her symptoms and medication requirements, but the hospice RN will follow up regarding placement. At first they were in agreement with IV medications for pain and wanted more morphine. Then later when MD at bedside wanted her home PO sublingual dosages of Morphine 10mg and 15mg for moderate and severe pain. They understand and acknowledge with risk of aspiration with oral medications and pleasure feeds. D/w MD. MOLST signed, 2 RNs signed as witness, MOLST form placed in chart.

## 2023-04-08 NOTE — CONSULT NOTE ADULT - SUBJECTIVE AND OBJECTIVE BOX
REASON FOR CONSULTATION:     HPI:  56yo F w/ PMH of stage IV rectal CA dx'd in 8/2022 on FOLFOX who underwent  colectomy with diverting colostomy (with pre-op paracentesis). s 3/13 without complication. sec to  colonic obstruction 2/2 rectal mass.  Pt has recurrent ascites for which she has undergone prior large volume paracenteses.  Pt was seen by GI to eval for possible colonic stent but they felt high risk for perforation came with AMS, poor po intake,low grade fever.Pt lives with family, was on home hospice but per family, pt is waiting for further treatment plan with hem/onc when physically more stable.  Long dw Surrogate/HCP/Guardian: Phone#: spouse Lenny Cruz - CELL [phone]  [home  [phone]] and daughter--per family ,pt was okay 2-3 days after discharged from hospital the her mental status and po intake declined as well as her activities. She was on home hospice. Family spoke with  and rec for inpatient  eval. Family noticed some discharge from colostomy side.Pt was on po abx at home per hospice.    Pt is a poor historian,lethagic.denies any pain. Known her name and place.            PMH  -stage IV rectal CA dx'd in 8/2022 on FOLFOX who underwent   colectomy with diverting colostomy (with pre-op paracentesis). s 3/13 without complication.sec to  colonic obstruction 2/2 rectal mass.  Anxiety  hypotension    FH-dm  Social history-remote smoker, social drinking/no illicit drugs.    < from: CT Chest w/ IV Cont (04.07.23 @ 22:00) >  IMPRESSION:  Small bilateral pleural effusions.    Large volume ascites with diffuse peritoneal carcinomatosis, progressed   since prior study. Severe diffuse colonic and small bowel serosal   involvement.    No bowel obstruction.    Nodular thickening of the cervix and upper vagina. Cannot exclude direct   extension of tumor involvement from patient's primary rectal mass.    < end of copied text >  < from: CT Head No Cont (04.07.23 @ 21:51) >  IMPRESSION:    No acute intracranial hemorrhage, mass effect, or CT evidence of a large   vascular territory infarct.    If there are new or persistentsymptoms, consider further evaluation with   MRI provided no contraindications.    < end of copied text >   (08 Apr 2023 07:58)      REVIEW OF SYSTEMS:  Constitutional, Eyes, ENT, Cardiovascular, Respiratory, Gastrointestinal, Genitourinary, Musculoskeletal, Integumentary, Neurological, Psychiatric, Endocrine, Heme/Lymph, and Allergic/Immunologic review of systems are otherwise negative except as noted in the HPI.    PAST MEDICAL & SURGICAL HISTORY:  Rectal cancer          FAMILY HISTORY:      SOCIAL HISTORY:    Allergies    No Known Allergies    Intolerances        MEDICATIONS  (STANDING):  dextrose 5% + sodium chloride 0.9%. 1000 milliLiter(s) (80 mL/Hr) IV Continuous <Continuous>  heparin   Injectable 5000 Unit(s) SubCutaneous every 12 hours    MEDICATIONS  (PRN):  acetaminophen     Tablet .. 650 milliGRAM(s) Oral every 6 hours PRN Temp greater or equal to 38C (100.4F), Mild Pain (1 - 3)  aluminum hydroxide/magnesium hydroxide/simethicone Suspension 30 milliLiter(s) Oral every 4 hours PRN Dyspepsia  melatonin 3 milliGRAM(s) Oral at bedtime PRN Insomnia  morphine  - Injectable 1 milliGRAM(s) IV Push every 4 hours PRN Severe Pain (7 - 10)  morphine  - Injectable 0.5 milliGRAM(s) IV Push every 4 hours PRN Moderate Pain (4 - 6)  ondansetron Injectable 4 milliGRAM(s) IV Push every 8 hours PRN Nausea and/or Vomiting      Vital Signs Last 24 Hrs  T(C): 36.8 (08 Apr 2023 08:10), Max: 36.9 (08 Apr 2023 00:31)  T(F): 98.2 (08 Apr 2023 08:10), Max: 98.4 (08 Apr 2023 00:31)  HR: 105 (08 Apr 2023 08:10) (100 - 109)  BP: 101/69 (08 Apr 2023 08:10) (94/61 - 101/69)  BP(mean): --  RR: 18 (08 Apr 2023 08:10) (16 - 18)  SpO2: 95% (08 Apr 2023 08:10) (95% - 97%)    Parameters below as of 08 Apr 2023 08:10  Patient On (Oxygen Delivery Method): room air        PHYSICAL EXAM:    GENERAL: NAD, well-groomed, well-developed  HEAD:  Atraumatic, Normocephalic  EYES: EOMI, PERRLA, conjunctiva and sclera clear  ENMT: No tonsillar erythema, exudates, or enlargement; Moist mucous membranes, Good dentition, No lesions  NECK: Supple, No JVD, Normal thyroid  NERVOUS SYSTEM:  Alert & Oriented X3, Good concentration; Motor Strength 5/5 B/L upper and lower extremities; DTRs 2+ intact and symmetric  CHEST/LUNG: Clear to auscultation bilaterally; No rales, rhonchi, wheezing, or rubs  HEART: Regular rate and rhythm; No murmurs, rubs, or gallops  ABDOMEN: Soft, Nontender, Nondistended; Bowel sounds present  EXTREMITIES:  2+ Peripheral Pulses, No clubbing, cyanosis, or edema  LYMPH: No lymphadenopathy noted  SKIN: No rashes or lesions      LABS:                        9.0    6.49  )-----------( 319      ( 08 Apr 2023 09:12 )             28.5     04-08    129<L>  |  92<L>  |  14.4  ----------------------------<  93  4.5   |  27.0  |  0.39<L>    Ca    8.4      08 Apr 2023 09:12    TPro  5.7<L>  /  Alb  2.4<L>  /  TBili  0.7  /  DBili  x   /  AST  13  /  ALT  <5  /  AlkPhos  97  04-08            RADIOLOGY & ADDITIONAL STUDIES:  < from: CT Chest w/ IV Cont (04.07.23 @ 22:00) >    IMPRESSION:  Small bilateral pleural effusions.    Large volume ascites with diffuse peritoneal carcinomatosis, progressed   since prior study. Severe diffuse colonic and small bowel serosal   involvement.    No bowel obstruction.    Nodular thickening of the cervix and upper vagina. Cannot exclude direct   extension of tumor involvement from patient's primary rectal mass.    --- End of Report ---      < end of copied text >         REASON FOR CONSULTATION:     HPI:  58yo F w/ PMH of stage IV rectal CA dx'd in 8/2022, most recently on 5FU/Jami, s/p large bowel obstruction in 3/2023 with diverting colostomy due to colonic obstruction 2/2 rectal mass.  Pt has recurrent ascites for which she has undergone prior large volume paracenteses.  Pt was seen by GI to eval for possible colonic stent but they felt high risk for perforation came with AMS, poor po intake, low grade fever. Pt lives with family, was on home hospice but per family, pt is waiting for further treatment plan with hem/onc when physically more stable.  Long dw Surrogate/HCP/Guardian: Phone#: spouse Lenny Cruz - CE[place] [phone]  [home  [phone]] and daughter--per family ,pt was okay 2-3 days after discharged from hospital the her mental status and po intake declined as well as her activities. She was on home hospice. Family spoke with  and rec for inpatient  eval. Family noticed some discharge from colostomy side.Pt was on po abx at home per hospice.    Pt is a poor historian, lethagic. Denies any pain. Known her name and place.        PMH  -stage IV rectal CA dx'd in 8/2022 on FOLFOX who underwent   colectomy with diverting colostomy (with pre-op paracentesis). s 3/13 without complication.sec to  colonic obstruction 2/2 rectal mass.  Anxiety  hypotension    FH-dm  Social history-remote smoker, social drinking/no illicit drugs.    < from: CT Chest w/ IV Cont (04.07.23 @ 22:00) >  IMPRESSION:  Small bilateral pleural effusions.    Large volume ascites with diffuse peritoneal carcinomatosis, progressed   since prior study. Severe diffuse colonic and small bowel serosal   involvement.    No bowel obstruction.    Nodular thickening of the cervix and upper vagina. Cannot exclude direct   extension of tumor involvement from patient's primary rectal mass.    < end of copied text >  < from: CT Head No Cont (04.07.23 @ 21:51) >  IMPRESSION:    No acute intracranial hemorrhage, mass effect, or CT evidence of a large   vascular territory infarct.    If there are new or persistentsymptoms, consider further evaluation with   MRI provided no contraindications.    < end of copied text >   (08 Apr 2023 07:58)      REVIEW OF SYSTEMS:  Constitutional, Eyes, ENT, Cardiovascular, Respiratory, Gastrointestinal, Genitourinary, Musculoskeletal, Integumentary, Neurological, Psychiatric, Endocrine, Heme/Lymph, and Allergic/Immunologic review of systems are otherwise negative except as noted in the HPI.      Allergies    No Known Allergies    Intolerances        MEDICATIONS  (STANDING):  dextrose 5% + sodium chloride 0.9%. 1000 milliLiter(s) (80 mL/Hr) IV Continuous <Continuous>  heparin   Injectable 5000 Unit(s) SubCutaneous every 12 hours    MEDICATIONS  (PRN):  acetaminophen     Tablet .. 650 milliGRAM(s) Oral every 6 hours PRN Temp greater or equal to 38C (100.4F), Mild Pain (1 - 3)  aluminum hydroxide/magnesium hydroxide/simethicone Suspension 30 milliLiter(s) Oral every 4 hours PRN Dyspepsia  melatonin 3 milliGRAM(s) Oral at bedtime PRN Insomnia  morphine  - Injectable 1 milliGRAM(s) IV Push every 4 hours PRN Severe Pain (7 - 10)  morphine  - Injectable 0.5 milliGRAM(s) IV Push every 4 hours PRN Moderate Pain (4 - 6)  ondansetron Injectable 4 milliGRAM(s) IV Push every 8 hours PRN Nausea and/or Vomiting      Vital Signs Last 24 Hrs  T(C): 36.8 (08 Apr 2023 08:10), Max: 36.9 (08 Apr 2023 00:31)  T(F): 98.2 (08 Apr 2023 08:10), Max: 98.4 (08 Apr 2023 00:31)  HR: 105 (08 Apr 2023 08:10) (100 - 109)  BP: 101/69 (08 Apr 2023 08:10) (94/61 - 101/69)  BP(mean): --  RR: 18 (08 Apr 2023 08:10) (16 - 18)  SpO2: 95% (08 Apr 2023 08:10) (95% - 97%)    Parameters below as of 08 Apr 2023 08:10  Patient On (Oxygen Delivery Method): room air        PHYSICAL EXAM:    GENERAL: NAD, well-groomed, well-developed  HEAD:  Atraumatic, Normocephalic  EYES: EOMI, PERRLA, conjunctiva and sclera clear  ENMT: No tonsillar erythema, exudates, or enlargement; Moist mucous membranes, Good dentition, No lesions  NECK: Supple, No JVD, Normal thyroid  NERVOUS SYSTEM:  Alert & Oriented X3, Good concentration; Motor Strength 5/5 B/L upper and lower extremities; DTRs 2+ intact and symmetric  CHEST/LUNG: Clear to auscultation bilaterally; No rales, rhonchi, wheezing, or rubs  HEART: Regular rate and rhythm; No murmurs, rubs, or gallops  ABDOMEN: Soft, Nontender, Nondistended; Bowel sounds present  EXTREMITIES:  2+ Peripheral Pulses, No clubbing, cyanosis, or edema  LYMPH: No lymphadenopathy noted  SKIN: No rashes or lesions      LABS:                        9.0    6.49  )-----------( 319      ( 08 Apr 2023 09:12 )             28.5     04-08    129<L>  |  92<L>  |  14.4  ----------------------------<  93  4.5   |  27.0  |  0.39<L>    Ca    8.4      08 Apr 2023 09:12    TPro  5.7<L>  /  Alb  2.4<L>  /  TBili  0.7  /  DBili  x   /  AST  13  /  ALT  <5  /  AlkPhos  97  04-08            RADIOLOGY & ADDITIONAL STUDIES:  < from: CT Chest w/ IV Cont (04.07.23 @ 22:00) >    IMPRESSION:  Small bilateral pleural effusions.    Large volume ascites with diffuse peritoneal carcinomatosis, progressed   since prior study. Severe diffuse colonic and small bowel serosal   involvement.    No bowel obstruction.    Nodular thickening of the cervix and upper vagina. Cannot exclude direct   extension of tumor involvement from patient's primary rectal mass.    --- End of Report ---      < end of copied text >         REASON FOR CONSULTATION:     HPI:  56yo F w/ PMH of stage IV rectal CA dx'd in 8/2022, most recently on 5FU/Jami, s/p large bowel obstruction in 3/2023 with diverting colostomy due to colonic obstruction 2/2 rectal mass.  Pt has recurrent ascites for which she has undergone prior large volume paracenteses.  Pt was seen by GI to eval for possible colonic stent but they felt high risk for perforation came with AMS, poor po intake, low grade fever. Pt lives with family, was on home hospice.  Long dw Surrogate/HCP/Guardian: Phone#: spouse Lenny Cruz - [place] [phone]  [home  [phone]] and daughter--per family ,pt was okay 2-3 days after discharged from hospital the her mental status and po intake declined as well as her activities. She was on home hospice. Family spoke with  and rec for inpatient  eval. Family noticed some discharge from colostomy side. Pt was on po abx at home per hospice.    Patient unable to participate in conversation.       PMH  -stage IV rectal CA dx'd in 8/2022 on FOLFOX who underwent   colectomy with diverting colostomy (with pre-op paracentesis). s 3/13 without complication.sec to  colonic obstruction 2/2 rectal mass.  Anxiety  hypotension    FH-dm  Social history-remote smoker, social drinking/no illicit drugs.    < from: CT Chest w/ IV Cont (04.07.23 @ 22:00) >  IMPRESSION:  Small bilateral pleural effusions.    Large volume ascites with diffuse peritoneal carcinomatosis, progressed   since prior study. Severe diffuse colonic and small bowel serosal   involvement.    No bowel obstruction.    Nodular thickening of the cervix and upper vagina. Cannot exclude direct   extension of tumor involvement from patient's primary rectal mass.    < end of copied text >  < from: CT Head No Cont (04.07.23 @ 21:51) >  IMPRESSION:    No acute intracranial hemorrhage, mass effect, or CT evidence of a large   vascular territory infarct.    If there are new or persistentsymptoms, consider further evaluation with   MRI provided no contraindications.    < end of copied text >   (08 Apr 2023 07:58)      REVIEW OF SYSTEMS:  Constitutional, Eyes, ENT, Cardiovascular, Respiratory, Gastrointestinal, Genitourinary, Musculoskeletal, Integumentary, Neurological, Psychiatric, Endocrine, Heme/Lymph, and Allergic/Immunologic review of systems are otherwise negative except as noted in the HPI.      Allergies    No Known Allergies    Intolerances        MEDICATIONS  (STANDING):  dextrose 5% + sodium chloride 0.9%. 1000 milliLiter(s) (80 mL/Hr) IV Continuous <Continuous>  heparin   Injectable 5000 Unit(s) SubCutaneous every 12 hours    MEDICATIONS  (PRN):  acetaminophen     Tablet .. 650 milliGRAM(s) Oral every 6 hours PRN Temp greater or equal to 38C (100.4F), Mild Pain (1 - 3)  aluminum hydroxide/magnesium hydroxide/simethicone Suspension 30 milliLiter(s) Oral every 4 hours PRN Dyspepsia  melatonin 3 milliGRAM(s) Oral at bedtime PRN Insomnia  morphine  - Injectable 1 milliGRAM(s) IV Push every 4 hours PRN Severe Pain (7 - 10)  morphine  - Injectable 0.5 milliGRAM(s) IV Push every 4 hours PRN Moderate Pain (4 - 6)  ondansetron Injectable 4 milliGRAM(s) IV Push every 8 hours PRN Nausea and/or Vomiting      Vital Signs Last 24 Hrs  T(C): 36.8 (08 Apr 2023 08:10), Max: 36.9 (08 Apr 2023 00:31)  T(F): 98.2 (08 Apr 2023 08:10), Max: 98.4 (08 Apr 2023 00:31)  HR: 105 (08 Apr 2023 08:10) (100 - 109)  BP: 101/69 (08 Apr 2023 08:10) (94/61 - 101/69)  BP(mean): --  RR: 18 (08 Apr 2023 08:10) (16 - 18)  SpO2: 95% (08 Apr 2023 08:10) (95% - 97%)    Parameters below as of 08 Apr 2023 08:10  Patient On (Oxygen Delivery Method): room air        PHYSICAL EXAM:    GENERAL: cachectic, appears in mild distress  HEAD:  Atraumatic,   NERVOUS SYSTEM:  sleepy, with eyes shut  ABDOMEN: distended abdomen with ostomy  EXTREMITIES: no  edema  LYMPH: No lymphadenopathy noted  SKIN: No rashes or lesions      LABS:                        9.0    6.49  )-----------( 319      ( 08 Apr 2023 09:12 )             28.5     04-08    129<L>  |  92<L>  |  14.4  ----------------------------<  93  4.5   |  27.0  |  0.39<L>    Ca    8.4      08 Apr 2023 09:12    TPro  5.7<L>  /  Alb  2.4<L>  /  TBili  0.7  /  DBili  x   /  AST  13  /  ALT  <5  /  AlkPhos  97  04-08            RADIOLOGY & ADDITIONAL STUDIES:  < from: CT Chest w/ IV Cont (04.07.23 @ 22:00) >    IMPRESSION:  Small bilateral pleural effusions.    Large volume ascites with diffuse peritoneal carcinomatosis, progressed   since prior study. Severe diffuse colonic and small bowel serosal   involvement.    No bowel obstruction.    Nodular thickening of the cervix and upper vagina. Cannot exclude direct   extension of tumor involvement from patient's primary rectal mass.    --- End of Report ---      < end of copied text >

## 2023-04-08 NOTE — CONSULT NOTE ADULT - NS ATTEND OPT1 GEN_ALL_CORE
I attest my time as attending is greater than 50% of the total combined time spent on qualifying patient care activities by the PA/NP and attending. 100

## 2023-04-08 NOTE — CONSULT NOTE ADULT - ASSESSMENT
56yo F w/ PMH of stage IV rectal CA dx'd in 8/2022 on FOLFOX who underwent colectomy with diverting colostomy (with pre-op paracentesis). s 3/13 without complication. sec to  colonic obstruction 2/2 rectal mass.    Pt has recurrent ascites for which she has undergone prior large volume paracenteses.   57 year old female with stage IV rectal CA dx'd in 8/2022, most recently on 5FU/Jami, s/p large bowel obstruction in 3/2023 with diverting colostomy due to colonic obstruction 2/2 rectal mass.  Pt has recurrent ascites for which she has undergone prior large volume paracenteses.  Pt was seen by GI to eval for possible colonic stent but they felt high risk for perforation came with AMS, poor po intake, low grade fever. Pt lives with family, and was on home hospice prior to admission.    CT c/a/p - evidence of large volume ascites, and progression in peritoneal carcinomatosis, severe diffuse and small bowel serosal involvement.     Plan: 57 year old female with stage IV rectal CA dx'd in 8/2022, most recently on 5FU/Jami, s/p large bowel obstruction in 3/2023 with diverting colostomy due to colonic obstruction 2/2 rectal mass.  Pt has recurrent ascites for which she has undergone prior large volume paracenteses.  Pt lives with family, and was on home hospice prior to admission. Now admitted with worsening SOB.     CT c/a/p - evidence of large volume ascites, and progression in peritoneal carcinomatosis, severe diffuse and small bowel serosal involvement.     Plan:  Spoke with family at bedside with , son, and daughter.  Discussed GOC. Discussed CT c/a/p results which show that there is progression of disease.   Patient seen by ID, recommendation was to d/c antibiotics and follow up blood cultures.  I discussed that overall prognosis is poor and recommended hospice.  The daughter had questions about nutrition and I discussed that forms of intake besides oral would not helpful at this time and I do not see the patient making a significant recovery in terms of strength even if alternates forms of nutrition were pursued.   We also discussed DNR and that recovery after a code would likely not be meaningful.  The family is leaning towards DNR and hospice but wanted some time to think it over.    D/w hospitalist Dr. Lechuga

## 2023-04-08 NOTE — PATIENT PROFILE ADULT - NSPROEXTENSIONSOFSELF_GEN_A_NUR
-none

## 2023-04-08 NOTE — H&P ADULT - ASSESSMENT
58yo F w/ PMH of stage IV rectal CA dx'd in 8/2022 on FOLFOX who underwent  colectomy with diverting colostomy (with pre-op paracentesis). s 3/13 without complication. sec to  colonic obstruction 2/2 rectal mass.  Pt has recurrent ascites for which she has undergone prior large volume paracenteses.  Pt was seen by GI to eval for possible colonic stent but they felt high risk for perforation came with AMS, poor po intake,low grade fever.Pt lives with family, was on home hospice but per family, pt is waiting for further treatment plan with hem/onc when physically more stable.  Long dw Surrogate/HCP/Guardian: Phone#: spouse Lenny Cruz - CELL [phone]  [home  [phone]] and daughter--per family ,pt was okay 2-3 days after discharged from hospital the her mental status and po intake declined as well as her activities. She was on home hospice. Family spoke with  and rec for inpatient  eval. Family noticed some discharge from colostomy side.    Acute metabolic encephalopathy   FTT  Hyponatremia  hypotension    -R/O any infection.? colostomy side infection  -ct abd/pelvis reviewed  -cth stable  -neuro check  -ivf, encourage to increase po intake w/assist  -blood/urine c/s. procal  -Iv abx.  -f/u colorectal surgery rec    Stage IV rectal CA dx'd in 8/2022 on FOLFOX who underwent  colectomy with diverting colostomy (with pre-op paracentesis). s 3/13 without complication.   sec to colonic obstruction 2/2 rectal mass  Malignant ascites s/p recurrent paracenteses  -f/u hem/onc rec    Anxiety  - Venlafaxine    dvt ppx  gi ppx  c/s palliative team  Long dw  and daughter -understood over all prognosis gaurded,risk of high mortalities.         56yo F w/ PMH of stage IV rectal CA dx'd in 8/2022 on FOLFOX who underwent  colectomy with diverting colostomy (with pre-op paracentesis). s 3/13 without complication. sec to  colonic obstruction 2/2 rectal mass.  Pt has recurrent ascites for which she has undergone prior large volume paracenteses.  Pt was seen by GI to eval for possible colonic stent but they felt high risk for perforation came with AMS, poor po intake,low grade fever.Pt lives with family, was on home hospice but per family, pt is waiting for further treatment plan with hem/onc when physically more stable.  Long dw Surrogate/HCP/Guardian: Phone#: spouse Lenny Cruz - CELL [phone]  [home  [phone]] and daughter--per family ,pt was okay 2-3 days after discharged from hospital the her mental status and po intake declined as well as her activities. She was on home hospice. Family spoke with  and rec for inpatient  eval. Family noticed some discharge from colostomy side.    Acute metabolic encephalopathy   FTT  Hyponatremia  hypotension    -R/O any infection.? colostomy side infection  -bolus ns then d5 ns  -ct abd/pelvis reviewed  -cth stable  -neuro check  -ivf, encourage to increase po intake w/assist  -blood/urine c/s. procal  -Iv cefepime and dose of vanco  -f/u colorectal surgery rec    Stage IV rectal CA dx'd in 8/2022 on FOLFOX who underwent  colectomy with diverting colostomy (with pre-op paracentesis). s 3/13 without complication.   sec to colonic obstruction 2/2 rectal mass  Malignant ascites s/p recurrent paracenteses  -f/u hem/onc rec    Anxiety  - Venlafaxine    dvt ppx  gi ppx  c/s palliative team  Long dw  and daughter -understood over all prognosis gaurded,risk of high mortalities.         56yo F w/ PMH of stage IV rectal CA dx'd in 8/2022 on FOLFOX who underwent  colectomy with diverting colostomy (with pre-op paracentesis). s 3/13 without complication. sec to  colonic obstruction 2/2 rectal mass.  Pt has recurrent ascites for which she has undergone prior large volume paracenteses.  Pt was seen by GI to eval for possible colonic stent but they felt high risk for perforation came with AMS, poor po intake,low grade fever.Pt lives with family, was on home hospice but per family, pt is waiting for further treatment plan with hem/onc when physically more stable.  Long dw Surrogate/HCP/Guardian: Phone#: spouse Lenny Cruz - CELL [phone]  [home  [phone]] and daughter--per family ,pt was okay 2-3 days after discharged from hospital the her mental status and po intake declined as well as her activities. She was on home hospice. Family spoke with  and rec for inpatient  eval. Family noticed some discharge from colostomy side.    Acute metabolic encephalopathy   FTT  Hyponatremia  hypotension    -R/O any infection.? colostomy side infection  -bolus ns then d5 ns  -ct abd/pelvis reviewed  -cth stable  -neuro check  -ivf, encourage to increase po intake w/assist  -blood/urine c/s. procal  -Iv cefepime and dose of vanco  -f/u colorectal surgery rec    Stage IV rectal CA dx'd in 8/2022 on FOLFOX who underwent  colectomy with diverting colostomy (with pre-op paracentesis). s 3/13 without complication.   sec to colonic obstruction 2/2 rectal mass  Malignant ascites s/p recurrent paracenteses  -us abd,may need paracentesis. no sign of sbp,will cont abx.   -f/u hem/onc rec    Anxiety  - Venlafaxine    dvt ppx  gi ppx  c/s palliative team  Long dw  and daughter -understood over all prognosis gaurded,risk of high mortalities.  spoke with (hem/onc)-she will contact family  spoke with colorectal team,does not think pt have infection colostomy side.

## 2023-04-08 NOTE — CONSULT NOTE ADULT - ASSESSMENT
57 year old female with stage IV rectal CA dx'd in 8/2022, most recently on 5FU/Jami, s/p large bowel obstruction in 3/2023 with diverting colostomy due to colonic obstruction 2/2 rectal mass.  Pt has recurrent ascites for which she has undergone prior large volume paracenteses.  Pt was seen by GI to eval for possible colonic stent but they felt high risk for perforation came with AMS, poor po intake, low grade fever. Pt lives with family, and was on home hospice prior to admission.    CT c/a/p - evidence of large volume ascites, and progression in peritoneal carcinomatosis, severe diffuse and small bowel serosal involvement.     Ascites likely malignant and not related to liver disease   GI sign off, follow up per oncology and medicine 57 year old female with stage IV rectal CA dx'd in 8/2022, most recently on 5FU/Jami, s/p large bowel obstruction in 3/2023 with diverting colostomy due to colonic obstruction 2/2 rectal mass.  Pt has recurrent ascites for which she has undergone prior large volume paracenteses.   Pt lives with family, and was on home hospice prior to admission.    CT c/a/p - evidence of large volume ascites, and progression in peritoneal carcinomatosis, severe diffuse and small bowel serosal involvement.     Ascites likely malignant and not related to liver disease   GI sign off, follow up per oncology and medicine

## 2023-04-08 NOTE — H&P ADULT - HISTORY OF PRESENT ILLNESS
56yo F w/ PMH of stage IV rectal CA dx'd in 8/2022 on FOLFOX who underwent  colectomy with diverting colostomy (with pre-op paracentesis). s 3/13 without complication. sec to  colonic obstruction 2/2 rectal mass.  Pt has recurrent ascites for which she has undergone prior large volume paracenteses.  Pt was seen by GI to eval for possible colonic stent but they felt high risk for perforation came with AMS, poor po intake,low grade fever.Pt lives with family, was on home hospice but per family, pt is waiting for further treatment plan with hem/onc when physically more stable.  Long dw Surrogate/HCP/Guardian: Phone#: spouse Lenny Cruz - CELL [phone]  [home  [phone]] and daughter--per family ,pt was okay 2-3 days after discharged from hospital the her mental status and po intake declined as well as her activities. She was on home hospice. Family spoke with  and rec for inpatient  eval. Family noticed some discharge from colostomy side.    per most recent oncology note - Dr Calabrese - in addition to rectal mass, w/u revealed malignant ascites, cirrhotic liver (no mets noted), omental caking and peritoneal carcinomatosis.  Plan was to stop oxaliplatin and, if POD on f/u imaging, to change to FOLFIRI. Pt is a poor historian,lethagic.denies any pain. Known her name and place.            PMH  -stage IV rectal CA dx'd in 8/2022 on FOLFOX who underwent   colectomy with diverting colostomy (with pre-op paracentesis). s 3/13 without complication.sec to  colonic obstruction 2/2 rectal mass.  Anxiety  hypotension    FH-dm  Social history-remote smoker, social drinking/no illicit drugs. 58yo F w/ PMH of stage IV rectal CA dx'd in 8/2022 on FOLFOX who underwent  colectomy with diverting colostomy (with pre-op paracentesis). s 3/13 without complication. sec to  colonic obstruction 2/2 rectal mass.  Pt has recurrent ascites for which she has undergone prior large volume paracenteses.  Pt was seen by GI to eval for possible colonic stent but they felt high risk for perforation came with AMS, poor po intake,low grade fever.Pt lives with family, was on home hospice but per family, pt is waiting for further treatment plan with hem/onc when physically more stable.  Long dw Surrogate/HCP/Guardian: Phone#: spouse Lenny Cruz - CELL [phone]  [home  [phone]] and daughter--per family ,pt was okay 2-3 days after discharged from hospital the her mental status and po intake declined as well as her activities. She was on home hospice. Family spoke with  and rec for inpatient  eval. Family noticed some discharge from colostomy side.    per most recent oncology note - Dr Calabrese - in addition to rectal mass, w/u revealed malignant ascites, cirrhotic liver (no mets noted), omental caking and peritoneal carcinomatosis.  Plan was to stop oxaliplatin and, if POD on f/u imaging, to change to FOLFIRI. Pt is a poor historian,lethagic.denies any pain. Known her name and place.            PMH  -stage IV rectal CA dx'd in 8/2022 on FOLFOX who underwent   colectomy with diverting colostomy (with pre-op paracentesis). s 3/13 without complication.sec to  colonic obstruction 2/2 rectal mass.  Anxiety  hypotension    FH-dm  Social history-remote smoker, social drinking/no illicit drugs.    < from: CT Chest w/ IV Cont (04.07.23 @ 22:00) >  IMPRESSION:  Small bilateral pleural effusions.    Large volume ascites with diffuse peritoneal carcinomatosis, progressed   since prior study. Severe diffuse colonic and small bowel serosal   involvement.    No bowel obstruction.    Nodular thickening of the cervix and upper vagina. Cannot exclude direct   extension of tumor involvement from patient's primary rectal mass.    < end of copied text >  < from: CT Head No Cont (04.07.23 @ 21:51) >  IMPRESSION:    No acute intracranial hemorrhage, mass effect, or CT evidence of a large   vascular territory infarct.    If there are new or persistentsymptoms, consider further evaluation with   MRI provided no contraindications.    < end of copied text >   58yo F w/ PMH of stage IV rectal CA dx'd in 8/2022 on FOLFOX who underwent  colectomy with diverting colostomy (with pre-op paracentesis). s 3/13 without complication. sec to  colonic obstruction 2/2 rectal mass.  Pt has recurrent ascites for which she has undergone prior large volume paracenteses.  Pt was seen by GI to eval for possible colonic stent but they felt high risk for perforation came with AMS, poor po intake,low grade fever.Pt lives with family, was on home hospice but per family, pt is waiting for further treatment plan with hem/onc when physically more stable.  Long dw Surrogate/HCP/Guardian: Phone#: spouse Lenny Cruz - CELL [phone]  [home  [phone]] and daughter--per family ,pt was okay 2-3 days after discharged from hospital the her mental status and po intake declined as well as her activities. She was on home hospice. Family spoke with  and rec for inpatient  eval. Family noticed some discharge from colostomy side.Pt was on po abx at home per hospice.     Pt is a poor historian,lethagic.denies any pain. Known her name and place.            PMH  -stage IV rectal CA dx'd in 8/2022 on FOLFOX who underwent   colectomy with diverting colostomy (with pre-op paracentesis). s 3/13 without complication.sec to  colonic obstruction 2/2 rectal mass.  Anxiety  hypotension    FH-dm  Social history-remote smoker, social drinking/no illicit drugs.    < from: CT Chest w/ IV Cont (04.07.23 @ 22:00) >  IMPRESSION:  Small bilateral pleural effusions.    Large volume ascites with diffuse peritoneal carcinomatosis, progressed   since prior study. Severe diffuse colonic and small bowel serosal   involvement.    No bowel obstruction.    Nodular thickening of the cervix and upper vagina. Cannot exclude direct   extension of tumor involvement from patient's primary rectal mass.    < end of copied text >  < from: CT Head No Cont (04.07.23 @ 21:51) >  IMPRESSION:    No acute intracranial hemorrhage, mass effect, or CT evidence of a large   vascular territory infarct.    If there are new or persistentsymptoms, consider further evaluation with   MRI provided no contraindications.    < end of copied text >

## 2023-04-08 NOTE — CONSULT NOTE ADULT - ATTENDING COMMENTS
Patient is well-known to service as she underwent a palliative diverting colostomy for obstructing rectal mass last month (no resection performed).  She has known peritoneal carcinomatosis and malignant ascites.  Called to evaluate wound and colostomy for potential infection.  On exam patient is cachectic and appears uncomfortable.  Abdomen is soft, moderately distended (less than preop).  Midline wound with superficial skin dehiscence and fibrinous exudate at base, but no induration or surrounding erythema.  Ostomy is flat, slightly retracted, sutures visible, fibrinous exudate also at this site.  CT done yesterday with noted ascites as well as progression of carcinomatosis, but stoma and wound do not appear concerning on imaging.    No surgical intervention warranted at this stage.  In reviewing patient's chart it appears that at this stage the family is electing for home hospice and comfort measures.  This is very reasonable.  Will follow peripherally only at this point.  Please call for any new questions or concerns.

## 2023-04-08 NOTE — H&P ADULT - CONVERSATION DETAILS
58yo F w/ PMH of stage IV rectal CA dx'd in 8/2022 on FOLFOX who underwent  colectomy with diverting colostomy (with pre-op paracentesis). s 3/13 without complication. sec to  colonic obstruction 2/2 rectal mass.  Pt has recurrent ascites for which she has undergone prior large volume paracenteses.  Pt was seen by GI to eval for possible colonic stent but they felt high risk for perforation came with AMS, poor po intake,low grade fever.Pt lives with family, was on home hospice but per family, pt is waiting for further treatment plan with hem/onc when physically more stable.  Long dw Surrogate/HCP/Guardian: Phone#: spouse Lenny Cruz - CELL [phone]  [home  [phone]] and daughter over phone from pt bedside. understool over all prognosis gaurded,risk of high mortality. pt was on hospice, wants pt to be full code now. wants to talk to oncology before make any further decision about hospice.

## 2023-04-08 NOTE — CONSULT NOTE ADULT - SUBJECTIVE AND OBJECTIVE BOX
[facility]  INFECTIOUS DISEASES at [place] and [place]  =======================================================                               Edgardo Castaneda MD#   Noemí Salcedo MD*                             Vicky Aponte MD*   Adrianna Weaver MD*            Diplomates American Board of Internal Medicine & Infectious Diseases                # [place] Office - Appt - Tel  [phone] Fax [phone]                * [place] Office - Appt - Tel [phone] Fax [phone]                                  Hospital Consult line:  [phone]  =======================================================      [id number]  BLESSING SANTO    History obtained from the chart. Patient unable to provide much history.     CC: Patient is a 57y old  Female who presents with a chief complaint of AMS (08 Apr 2023 07:58)      57y  Female with h/o stage IV rectal Ca dx'd in 8/2022 on FOLFOX who underwent  colectomy with diverting colostomy 3/13 due to  colonic obstruction from rectal mass. Patient has recurrent ascites for which she has undergone prior large volume paracenteses. She was seen by GI to eval for possible colonic stent but they felt high risk for perforation. Patient presented to the ER with AMS, poor po intake. In the ER patient is afebrile, no leukocytosis. Started on Cefepime for sepsis. ID input requested.       Past Medical & Surgical Hx:  Rectal cancer  colectomy with diverting colostomy 3/13      Social Hx:  Unable to obtain due to medical condition       FAMILY HISTORY:  Unable to obtain due to medical condition       Allergies  No Known Allergies       REVIEW OF SYSTEMS:  Unable to obtain due to medical condition       Physical Exam:  GEN: lethargic, cachetic   HEENT: normocephalic and atraumatic.   NECK: Supple.   LUNGS: CTA B/L.  HEART: RRR  ABDOMEN: Soft, NT, ND.  +BS. + Ostomy     : No CVA tenderness  EXTREMITIES: Without  edema.  MSK: No joint swelling  NEUROLOGIC: lethargic   PSYCHIATRIC: unable to obtain due to medical condition   SKIN: No rash      Height (cm): 157.5 (04-07 @ 16:08)  Weight (kg): 45.4 (04-08 @ 10:14)  BMI (kg/m2): 18.3 (04-08 @ 10:14)  BSA (m2): 1.42 (04-08 @ 10:14)      Vitals:  T(F): 98.2 (08 Apr 2023 08:10), Max: 98.4 (08 Apr 2023 00:31)  HR: 105 (08 Apr 2023 08:10)  BP: 101/69 (08 Apr 2023 08:10)  RR: 18 (08 Apr 2023 08:10)  SpO2: 95% (08 Apr 2023 08:10) (95% - 97%)  temp max in last 48H T(F): , Max: 98.4 (04-08-23 @ 00:31)      Current Antibiotics:  cefepime  Injectable. 1000 milliGRAM(s) IV Push every 12 hours    Other medications:  dextrose 5% + sodium chloride 0.9%. 1000 milliLiter(s) IV Continuous <Continuous>  heparin   Injectable 5000 Unit(s) SubCutaneous every 12 hours                 9.0    6.49  )-----------( 319      ( 08 Apr 2023 09:12 )             28.5     04-08    129<L>  |  92<L>  |  14.4  ----------------------------<  93  4.5   |  27.0  |  0.39<L>    Ca    8.4      08 Apr 2023 09:12    TPro  5.7<L>  /  Alb  2.4<L>  /  TBili  0.7  /  DBili  x   /  AST  13  /  ALT  <5  /  AlkPhos  97  04-08    RECENT CULTURES:  04-08 @ 07:57    RVP  NotDetec      WBC Count: 6.49 K/uL (04-08-23 @ 09:12)  WBC Count: 8.11 K/uL (04-07-23 @ 17:55)    Creatinine, Serum: 0.39 mg/dL (04-08-23 @ 09:12)  Creatinine, Serum: 0.32 mg/dL (04-08-23 @ 05:15)  Creatinine, Serum: 0.32 mg/dL (04-07-23 @ 20:15)    Procalcitonin, Serum: 0.45 ng/mL (04-08-23 @ 09:12)     SARS-CoV-2: NotDetec (04-08-23 @ 07:57)  COVID-19 PCR: NotDetec (03-10-23 @ 16:08)        < from: CT Abdomen and Pelvis w/ IV Cont (04.07.23 @ 21:55) >  ACC: [id number] EXAM:  CT CHEST IC   ORDERED BY: CANDIS ACKERMAN     ACC: [id number] EXAM:  CT ABDOMEN AND PELVIS IC   ORDERED BY: MALINI STEWART     PROCEDURE DATE:  04/07/2023      INTERPRETATION:  CLINICAL INFORMATION: Colon cancer. Abdominal distention.    COMPARISON: CT chest abdomen pelvis 3/10/2023.    CONTRAST/COMPLICATIONS:  IV Contrast: IV contrast documented in unlinked concurrent exam   (accession [id number]), Omnipaque 350 (accession [id number])  75 cc   administered   25 cc discarded  Oral Contrast: NONE  Complications: None reported at time of study completion    PROCEDURE:  CT of the Chest, Abdomen and Pelvis was performed.  Sagittal and coronal reformats were performed.    FINDINGS:  CHEST:  LUNGS AND LARGE AIRWAYS: Patent central airways. Bilateral lower lobe   compressive atelectasis adjacent to pleural effusions.  PLEURA: Small bilateral pleural effusions.  VESSELS: Within normal limits.  HEART: Heart size is normal. No pericardial effusion.  MEDIASTINUM AND HALEY: No lymphadenopathy.  CHEST WALL AND LOWER NECK: Right anterior chest wall Mediport with   catheter tip in the right atrium diffuse cachexia.    ABDOMEN AND PELVIS:  LIVER: Mildly scalloped appearance of the liver.  BILE DUCTS: Normal caliber.  GALLBLADDER: Biliary sludge.  SPLEEN: Prominent spleen.  PANCREAS: Within normal limits.  ADRENALS: Within normal limits.  KIDNEYS/URETERS: Within normal limits.    BLADDER: Within normal limits.  REPRODUCTIVE ORGANS: Nodular thickening of the cervix and vagina.   Heterogeneous enhancement of the left posterior uterine body.    BOWEL: Diffuse masslike thickening of the rectum in keeping with the   history of rectal mass. Left lower quadrant diverting colostomy. Diffuse   irregular wall thickening of the colon and small bowel.  PERITONEUM: Large volume ascites. Extensive omental caking with diffuse   serosal involvement of large and small bowel compatible with peritoneal   carcinomatosis, progressed since prior study.  VESSELS: Within normal limits.  RETROPERITONEUM/LYMPH NODES: No lymphadenopathy.  ABDOMINAL WALL: Diffusely cachectic..  BONES: Mild degenerative change of the spine.    IMPRESSION:  Small bilateral pleural effusions.    Large volume ascites with diffuse peritoneal carcinomatosis, progressed   since prior study. Severe diffuse colonic and small bowel serosal   involvement.    No bowel obstruction.    Nodular thickening of the cervix and upper vagina. Cannot exclude direct   extension of tumor involvement from patient's primary rectal mass.    --- End of Report ---  < end of copied text >      < from: CT Head No Cont (04.07.23 @ 21:51) >  ACC: [id number] EXAM:  CT BRAIN   ORDERED BY: MALINI WESLEY DATE:  04/07/2023          INTERPRETATION:  NONCONTRAST CT OF THE BRAIN    CLINICAL HISTORY: Altered mental status.    TECHNIQUE: Axial CT images are obtained from the cranial vertex to the   skull base without the administration of IV contrast.    No similar prior studies are available for comparison.    FINDINGS:    Beam hardening artifact slightly obscures evaluation of the posterior   fossa and brainstem.    There is no acute intra-axial or extra-axial hemorrhage. No mass effect   or shift of the midline. The basal cisterns are not effaced. There is   mild cerebral volume loss with prominence of the ventricles and sulci.   There are mild patchy areas of low attenuation within the periventricular   and subcortical white matter which are nonspecific but likely the sequela   of chronic microvascular change. There is no CT evidence of a large   vascular territory infarct.    The visualized paranasal sinuses and mastoid air cells are well aerated.    The regional soft tissues and bony calvarium are unremarkable.    IMPRESSION:    No acute intracranial hemorrhage, mass effect, or CT evidence of a large   vascular territory infarct.    If there are new or persistentsymptoms, consider further evaluation with   MRI provided no contraindications.    --- End of Report ---    < end of copied text >

## 2023-04-08 NOTE — CONSULT NOTE ADULT - ASSESSMENT
57y  Female with h/o stage IV rectal Ca dx'd in 8/2022 on FOLFOX who underwent  colectomy with diverting colostomy 3/13 due to  colonic obstruction from rectal mass. Patient has recurrent ascites for which she has undergone prior large volume paracenteses. She was seen by GI to eval for possible colonic stent but they felt high risk for perforation. Patient presented to the ER with AMS, poor po intake. In the ER patient is afebrile, no leukocytosis. Started on Cefepime for sepsis.        r/o Sepsis  Failure to thrive   Metastatic disease       - No Fever  - No leukocytosis   - Blood cultures pending  - CT C/A/P reporting peritoneal carcinomatosis and metastatic disease  - CT Head reporting no acute findings.   - no signs of Sepsis at this time  - D/C Cefepime  - Monitor off antibiotics      Will sign off. Please call PRN.

## 2023-04-08 NOTE — CONSULT NOTE ADULT - ASSESSMENT
ASSESSMENT: Patient is a 57yf pmhx of metastatic obstructing rectal cancer, now with diverting ostomy from 3/13, here with wound drainage.    PLAN:    - No urgent surgical intervention indicated at this time  - Medicine evaluation for admission for FTT and GOC conversations  - Will follow along with you   - Plan discussed with Attending, Dr. Stratton

## 2023-04-09 NOTE — DISCHARGE NOTE NURSING/CASE MANAGEMENT/SOCIAL WORK - NSDCPEFALRISK_GEN_ALL_CORE
For information on Fall & Injury Prevention, visit: [email or web address] OR  [email or web address] OR  https://www.cdc.gov/steadi/patient.html

## 2023-04-09 NOTE — PROGRESS NOTE ADULT - ATTENDING COMMENTS
I have personally seen and examined patient on the above date.  I discussed the case with MD Resident Dr Espinoza and I agree with findings and plan as detailed per note above, which I have amended where appropriate.

## 2023-04-09 NOTE — DISCHARGE NOTE PROVIDER - ATTENDING DISCHARGE PHYSICAL EXAMINATION:
PHYSICAL EXAM:  General: sleeping, cachetic, appeared comfortable  ENT: dry MM  Neck: Supple, No JVD  Lungs: Diminished breath sounds  Cardio: +s1/s2  Abdomen: Soft, Nontender, Nondistended  Extremities: No pitting edema

## 2023-04-09 NOTE — DISCHARGE NOTE NURSING/CASE MANAGEMENT/SOCIAL WORK - PATIENT PORTAL LINK FT
You can access the FollowMyHealth Patient Portal offered by [facility] by registering at the following website: [email or web address]. By joining [email or web address] FollowMyHealth portal, you will also be able to view your health information using other applications (apps) compatible with our system.

## 2023-04-09 NOTE — DISCHARGE NOTE PROVIDER - NSDCMRMEDTOKEN_GEN_ALL_CORE_FT
Sent medication refill to patient's preferred pharmacy on file.     morphine 10 mg oral tablet: 1 tab(s) orally every 4 hours as needed for  severe pain  morphine 15 mg oral tablet: 1 tab(s) orally every 4 hours as needed for  severe pain  ondansetron 8 mg oral tablet: 1 tab(s) orally every 8 hours, As Needed  venlafaxine 150 mg oral tablet, extended release: 1 tab(s) orally once a day

## 2023-04-09 NOTE — PROGRESS NOTE ADULT - ASSESSMENT
56yo F w/ PMH of stage IV rectal CA dx'd in 8/2022 on FOLFOX who underwent  colectomy with diverting colostomy (with pre-op paracentesis). s 3/13 without complication. sec to  colonic obstruction 2/2 rectal mass.  Pt has recurrent ascites for which she has undergone prior large volume paracenteses.  Pt was seen by GI to eval for possible colonic stent but they felt high risk for perforation came with AMS, poor po intake,low grade fever.Pt lives with family, was on home hospice    #Acute metabolic encephalopathy   Likely 2/2 to malignant ascites and worsening GI illness- colorectal cancer  CTAP reviewed  Colorectal surgery consulted, appreciate recs- low concern for infection of colostomy side  GI consulted, appreciate recs  Poor prognosis, case discussed with family- planned return home with home hospice 58yo F w/ PMH of stage IV rectal CA dx'd in 8/2022 on FOLFOX who underwent  colectomy with diverting colostomy (with pre-op paracentesis). s 3/13 without complication. sec to  colonic obstruction 2/2 rectal mass.  Pt has recurrent ascites for which she has undergone prior large volume paracenteses.  Pt was seen by GI to eval for possible colonic stent but they felt high risk for perforation came with AMS, poor po intake,low grade fever.Pt lives with family, was on home hospice    #Acute metabolic encephalopathy   - Likely 2/2 to malignant ascites and worsening GI illness- colorectal cancer  patient family daughter and  bedside stating does not want inpatient hospice. patient very lethargic and emotional support provided. patient family stating have hospice equipment and meds at home.

## 2023-04-09 NOTE — DISCHARGE NOTE PROVIDER - NSDCCPCAREPLAN_GEN_ALL_CORE_FT
PRINCIPAL DISCHARGE DIAGNOSIS  Diagnosis: Failure to thrive in adult  Assessment and Plan of Treatment: - 2/2 malignant ascites and worsening cancer  - care per home hospice

## 2023-04-09 NOTE — PROGRESS NOTE ADULT - SUBJECTIVE AND OBJECTIVE BOX
INTERVAL HPI/OVERNIGHT EVENTS:FU for metastatic rectal cancer. Remains encephalopathic. Family at bedside. Family wants to take the patient for home hospice. Spoke to Dr Guerra.    MEDICATIONS  (STANDING):    MEDICATIONS  (PRN):  LORazepam    Concentrate 1 milliGRAM(s) SubLingual every 4 hours PRN agitation, RR >22  morphine  - Injectable 0.5 milliGRAM(s) IV Push every 4 hours PRN Breakthrough pain  morphine Concentrate 10 milliGRAM(s) SubLingual every 4 hours PRN Moderate Pain (4 - 6)  morphine Concentrate 15 milliGRAM(s) SubLingual every 4 hours PRN Severe Pain (7 - 10)  ondansetron Injectable 4 milliGRAM(s) IV Push every 8 hours PRN Nausea and/or Vomiting      Allergies    No Known Allergies    Intolerances        Vital Signs Last 24 Hrs  T(C): 36.6 (08 Apr 2023 11:50), Max: 36.6 (08 Apr 2023 11:50)  T(F): 97.9 (08 Apr 2023 11:50), Max: 97.9 (08 Apr 2023 11:50)  HR: 99 (08 Apr 2023 13:54) (96 - 99)  BP: 96/67 (08 Apr 2023 13:54) (89/64 - 96/67)  BP(mean): --  RR: 18 (08 Apr 2023 13:54) (18 - 18)  SpO2: 99% (08 Apr 2023 13:54) (99% - 100%)    Parameters below as of 08 Apr 2023 13:54  Patient On (Oxygen Delivery Method): nasal cannula  O2 Flow (L/min): 2      LABS:                        9.0    6.49  )-----------( 319      ( 08 Apr 2023 09:12 )             28.5     04-08    129<L>  |  92<L>  |  14.4  ----------------------------<  93  4.5   |  27.0  |  0.39<L>    Ca    8.4      08 Apr 2023 09:12    TPro  5.7<L>  /  Alb  2.4<L>  /  TBili  0.7  /  DBili  x   /  AST  13  /  ALT  <5  /  AlkPhos  97  04-08          RADIOLOGY & ADDITIONAL TESTS:

## 2023-04-09 NOTE — DISCHARGE NOTE PROVIDER - NSDCFUSCHEDAPPT_GEN_ALL_CORE_FT
Britney Calabrese  [facility]  Luz Brink  Scheduled Appointment: 04/10/2023

## 2023-04-09 NOTE — DISCHARGE NOTE PROVIDER - HOSPITAL COURSE
58yo F w/ PMH of stage IV rectal CA dx'd in 8/2022 on FOLFOX who underwent  colectomy with diverting colostomy (with pre-op paracentesis). s 3/13 without complication. sec to  colonic obstruction 2/2 rectal mass.  Pt has recurrent ascites for which she has undergone prior large volume paracenteses in past presented to [facility] with AMS.  Patient family opting for home hospice and patient being discharge on home hospice.

## 2023-04-09 NOTE — PROGRESS NOTE ADULT - SUBJECTIVE AND OBJECTIVE BOX
Patient is a 57y old  Female who presents with a chief complaint of AMS (09 Apr 2023 10:50)      Patient seen and examined at bedside. No overnight events reported.     ALLERGIES:  No Known Allergies    MEDICATIONS  (STANDING):    MEDICATIONS  (PRN):  LORazepam    Concentrate 1 milliGRAM(s) SubLingual every 4 hours PRN agitation, RR >22  morphine  - Injectable 0.5 milliGRAM(s) IV Push every 4 hours PRN Breakthrough pain  morphine Concentrate 10 milliGRAM(s) SubLingual every 4 hours PRN Moderate Pain (4 - 6)  morphine Concentrate 15 milliGRAM(s) SubLingual every 4 hours PRN Severe Pain (7 - 10)  ondansetron Injectable 4 milliGRAM(s) IV Push every 8 hours PRN Nausea and/or Vomiting    Vital Signs Last 24 Hrs  T(F): 97.9 (08 Apr 2023 11:50), Max: 97.9 (08 Apr 2023 11:50)  HR: 99 (08 Apr 2023 13:54) (96 - 99)  BP: 96/67 (08 Apr 2023 13:54) (89/64 - 96/67)  RR: 18 (08 Apr 2023 13:54) (18 - 18)  SpO2: 99% (08 Apr 2023 13:54) (99% - 100%)  I&O's Summary    08 Apr 2023 07:01  -  09 Apr 2023 07:00  --------------------------------------------------------  IN: 40 mL / OUT: 0 mL / NET: 40 mL      PHYSICAL EXAM:  General: sleeping, unresponsive, appeared comfortable  ENT: dry MM  Neck: Supple, No JVD  Lungs: Diminished breath sounds  Cardio: RRR, S1/S2, No murmur  Abdomen: Soft, Nontender, Nondistended  Extremities: No pitting edema    LABS:                        9.0    6.49  )-----------( 319      ( 08 Apr 2023 09:12 )             28.5     04-08    129  |  92  |  14.4  ----------------------------<  93  4.5   |  27.0  |  0.39    Ca    8.4      08 Apr 2023 09:12    TPro  5.7  /  Alb  2.4  /  TBili  0.7  /  DBili  x   /  AST  13  /  ALT  <5  /  AlkPhos  97  04-08              Procalcitonin, Serum: 0.45 ng/mL (04-08-23 @ 09:12)            17:55 - VBG - pH:       | pCO2:       | pO2:       | Lactate: 1.30             Culture - Blood (collected 07 Apr 2023 18:05)  Source: .Blood Blood-Peripheral  Preliminary Report (09 Apr 2023 01:02):    No growth to date.    Culture - Blood (collected 07 Apr 2023 18:00)  Source: .Blood Blood-Peripheral  Preliminary Report (09 Apr 2023 01:02):    No growth to date.      COVID-19 PCR: NotDetec (03-10-23 @ 16:08)    RADIOLOGY & ADDITIONAL TESTS: No new tests    Care Discussed with Consultants/Other Providers: Gastroenterology   Patient is a 57y old  Female who presents with a chief complaint of AMS (09 Apr 2023 10:50)    Patient seen and examined at bedside.      ALLERGIES:  No Known Allergies    MEDICATIONS  (STANDING):    MEDICATIONS  (PRN):  LORazepam    Concentrate 1 milliGRAM(s) SubLingual every 4 hours PRN agitation, RR >22  morphine  - Injectable 0.5 milliGRAM(s) IV Push every 4 hours PRN Breakthrough pain  morphine Concentrate 10 milliGRAM(s) SubLingual every 4 hours PRN Moderate Pain (4 - 6)  morphine Concentrate 15 milliGRAM(s) SubLingual every 4 hours PRN Severe Pain (7 - 10)  ondansetron Injectable 4 milliGRAM(s) IV Push every 8 hours PRN Nausea and/or Vomiting    Vital Signs Last 24 Hrs  T(F): 97.9 (08 Apr 2023 11:50), Max: 97.9 (08 Apr 2023 11:50)  HR: 99 (08 Apr 2023 13:54) (96 - 99)  BP: 96/67 (08 Apr 2023 13:54) (89/64 - 96/67)  RR: 18 (08 Apr 2023 13:54) (18 - 18)  SpO2: 99% (08 Apr 2023 13:54) (99% - 100%)  I&O's Summary    08 Apr 2023 07:01  -  09 Apr 2023 07:00  --------------------------------------------------------  IN: 40 mL / OUT: 0 mL / NET: 40 mL      PHYSICAL EXAM:  General: sleeping, cachetic, appeared comfortable  ENT: dry MM  Neck: Supple, No JVD  Lungs: Diminished breath sounds  Cardio: +s1/s2  Abdomen: Soft, Nontender, Nondistended  Extremities: No pitting edema    LABS:                        9.0    6.49  )-----------( 319      ( 08 Apr 2023 09:12 )             28.5     04-08    129  |  92  |  14.4  ----------------------------<  93  4.5   |  27.0  |  0.39    Ca    8.4      08 Apr 2023 09:12    TPro  5.7  /  Alb  2.4  /  TBili  0.7  /  DBili  x   /  AST  13  /  ALT  <5  /  AlkPhos  97  04-08    Procalcitonin, Serum: 0.45 ng/mL (04-08-23 @ 09:12)    17:55 - VBG - pH:       | pCO2:       | pO2:       | Lactate: 1.30     Cultures  Culture - Blood (collected 07 Apr 2023 18:05)  Source: .Blood Blood-Peripheral  Preliminary Report (09 Apr 2023 01:02):    No growth to date.    Culture - Blood (collected 07 Apr 2023 18:00)  Source: .Blood Blood-Peripheral  Preliminary Report (09 Apr 2023 01:02):    No growth to date.      COVID-19 PCR: NotDetec (03-10-23 @ 16:08)    RADIOLOGY & ADDITIONAL TESTS:   No new tests    Care Discussed with Consultants/Other Providers:   Gastroenterology   Statement Selected

## 2023-05-01 NOTE — DIETITIAN INITIAL EVALUATION ADULT - WEIGHT FOR BMI (LBS)
Received request via: Patient    Was the patient seen in the last year in this department? Yes    Does the patient have an active prescription (recently filled or refills available) for medication(s) requested? No    Does the patient have [facility] and need 100 day supply (blood pressure, diabetes and cholesterol meds only)? Yes, quantity updated to 100 days.   134

## 2023-05-24 PROBLEM — C78.6 PERITONEAL CARCINOMATOSIS: Status: ACTIVE | Noted: 2022-01-01

## 2023-05-24 PROBLEM — C20 RECTAL CANCER: Status: ACTIVE | Noted: 2022-01-01

## 2023-11-07 LAB — SARS-COV-2 N GENE NPH QL NAA+PROBE: NOT DETECTED

## 2024-05-22 NOTE — PHYSICAL THERAPY INITIAL EVALUATION ADULT - LEVEL OF INDEPENDENCE: STAIR NEGOTIATION, REHAB EVAL
Addended by: SONJA GROVE on: 5/22/2024 01:54 PM     Modules accepted: Orders    
Pt declines/unable to perform

## 2024-09-19 NOTE — PATIENT PROFILE ADULT - STAGE
Is This A New Presentation Or A Follow-Up?: Dry Skin
How Severe Is Your Dry Skin?: moderate
stage II

## 2024-09-29 NOTE — DISCHARGE NOTE PROVIDER - NSDCHHCONTACT_GEN_ALL_CORE_FT
MED As certified below, I, or a nurse practitioner or physician assistant working with me, had a face-to-face encounter that meets the physician face-to-face encounter requirements.

## 2025-02-13 NOTE — PHYSICAL THERAPY INITIAL EVALUATION ADULT - CRITERIA FOR SKILLED THERAPEUTIC INTERVENTIONS
impairments found/functional limitations in following categories/risk reduction/prevention/rehab potential/therapy frequency/predicted duration of therapy intervention/anticipated equipment needs at discharge/anticipated discharge recommendation
no
